# Patient Record
Sex: MALE | Employment: OTHER | URBAN - METROPOLITAN AREA
[De-identification: names, ages, dates, MRNs, and addresses within clinical notes are randomized per-mention and may not be internally consistent; named-entity substitution may affect disease eponyms.]

---

## 2017-10-25 PROBLEM — N40.0 BENIGN PROSTATE HYPERPLASIA: Status: ACTIVE | Noted: 2017-10-25

## 2018-10-11 ENCOUNTER — OFFICE VISIT (OUTPATIENT)
Dept: CARDIOLOGY CLINIC | Age: 83
End: 2018-10-11

## 2018-10-11 VITALS
BODY MASS INDEX: 26.12 KG/M2 | SYSTOLIC BLOOD PRESSURE: 154 MMHG | DIASTOLIC BLOOD PRESSURE: 78 MMHG | OXYGEN SATURATION: 98 % | WEIGHT: 198 LBS | HEART RATE: 64 BPM

## 2018-10-11 DIAGNOSIS — I10 ESSENTIAL HYPERTENSION WITH GOAL BLOOD PRESSURE LESS THAN 140/90: ICD-10-CM

## 2018-10-11 DIAGNOSIS — E78.00 PURE HYPERCHOLESTEROLEMIA: ICD-10-CM

## 2018-10-11 DIAGNOSIS — I25.10 CORONARY ARTERY DISEASE DUE TO LIPID RICH PLAQUE: Primary | ICD-10-CM

## 2018-10-11 DIAGNOSIS — I25.83 CORONARY ARTERY DISEASE DUE TO LIPID RICH PLAQUE: Primary | ICD-10-CM

## 2018-10-11 RX ORDER — NITROGLYCERIN 0.4 MG/1
0.4 TABLET SUBLINGUAL AS NEEDED
Qty: 30 TAB | Refills: 0 | Status: SHIPPED | OUTPATIENT
Start: 2018-10-11 | End: 2018-10-30 | Stop reason: SDUPTHER

## 2018-10-11 NOTE — MR AVS SNAPSHOT
[address] [facility] [address] [facility] [id number] 
[phone] Patient: Kathie Taveras MRN: [id number] FPV:[id number] Visit Information Date & Time Provider Department Dept. Phone Encounter #  
 10/11/2018  1:15 PM Odilon Marsh  [facility] [phone] [id number] Follow-up Instructions Return in about 1 year (around 10/11/2019). Your Appointments 11/5/2018  1:00 PM  
Office Visit with Miguel Sweet MD  
[address] [facility] Appt Note: IOV REFERRED BY IRA DUNN FOR VASCULAR CONSULT  AND EVALUATE FOR PAD, PVD/ OFFICE [facility] NOTES  
 [phone] [facility]  
[phone] [address] [facility] [address] [facility] Upcoming Health Maintenance Date Due  
 LIPID PANEL Q1 10[birth year]20/1933 FOOT EXAM Q1 10/20/1943 MICROALBUMIN Q1 10/20/1943 EYE EXAM RETINAL OR DILATED Q1 10/20/1943 DTaP/Tdap/Td series (1 - Tdap) 10/20/1954 Shingrix Vaccine Age 50> (1 of 2) 10/20/1983 GLAUCOMA SCREENING Q2Y 10/20/1998 HEMOGLOBIN A1C Q6M 11/7/2014 MEDICARE YEARLY EXAM 3/14/2018 Influenza Age 5 to Adult 8/1/2018 Allergies as of 10/11/2018  Review Complete On: 10/11/2018 By: Anibal Sanchez LPN No Known Allergies Current Immunizations  Never Reviewed Name Date Influenza Vaccine (Quad) PF 10/26/2017 12:26 PM  
 Pneumococcal Conjugate (PCV-13) 10/26/2017 12:26 PM  
 Pneumococcal Polysaccharide (PPSV-23) 12/23/2015  4:13 PM, 5/10/2014 12:00 AM  
  
 Not reviewed this visit Vitals BP Pulse Weight(growth percentile) SpO2 BMI Smoking Status 154/78 64 198 lb (89.8 kg) 98% 26.12 kg/m2 Never Smoker BMI and BSA Data Body Mass Index Body Surface Area  
 26.12 kg/m 2 2.15 m 2 Preferred Pharmacy Pharmacy Name Phone [id number]- [id number] [facility], [address] ,[address] [phone] Your Updated Medication List  
  
   
This list is accurate as of 10/11/18  1:23 PM.  Always use your most recent med list.  
  
  
  
  
 aspirin 81 mg chewable tablet Take 1 Tab by mouth daily. docusate sodium 100 mg capsule Commonly known as:  Marlynn Jumana Take 100 mg by mouth daily. entacapone 200 mg tablet Commonly known as:  Monna Pleasure Take 200 mg by mouth three (3) times daily. gabapentin 600 mg tablet Commonly known as:  NEURONTIN Take 600 mg by mouth nightly. isosorbide mononitrate ER 60 mg CR tablet Commonly known as:  IMDUR Take 1 Tab by mouth daily. LORazepam 0.5 mg tablet Commonly known as:  ATIVAN Take  by mouth.  
  
 metoprolol tartrate 25 mg tablet Commonly known as:  LOPRESSOR Take 1 Tab by mouth two (2) times a day. nitroglycerin 0.4 mg SL tablet Commonly known as:  NITROSTAT  
1 Tab by SubLINGual route as needed for Chest Pain. pentoxifylline  mg CR tablet Commonly known as:  TRENTAL Take 400 mg by mouth three (3) times daily (with meals). ramipril 10 mg capsule Commonly known as:  ALTACE Take 1 Cap by mouth daily. raNITIdine 150 mg tablet Commonly known as:  ZANTAC  
  
 ROBINUL 1 mg tablet Generic drug:  glycopyrrolate Take 1 mg by mouth as needed. parkinson  
  
 simvastatin 20 mg tablet Commonly known as:  ZOCOR Take 20 mg by mouth nightly. Sinemet-25/250  mg per tablet Generic drug:  carbidopa-levodopa Take 1 Tab by mouth three (3) times daily. SITagliptin 50 mg tablet Commonly known as:  Yenifer Furnace Take 1 Tab by mouth daily. tamsulosin 0.4 mg capsule Commonly known as:  FLOMAX Take 1 capsule by mouth daily (after dinner). Follow-up Instructions Return in about 1 year (around 10/11/2019). Introducing [facility]! [employer] introduces [employer] patient portal. Now you can access parts of your medical record, email your doctor's office, and request medication refills online. 1. In your internet browser, go to [email or web address] 2. Click on the First Time User? Click Here link in the Sign In box. You will see the New Member Sign Up page. 3. Enter your S[employer]tl Access Code exactly as it appears below. You will not need to use this code after youve completed the sign-up process. If you do not sign up before the expiration date, you must request a new code. · [employer]tl Access Code: [email or web address] Expires: 12/25/2018 11:34 AM 
 
4. Enter the last four digits of your Social Security Number (xxxx) and Date of Birth (mm/dd/yyyy) as indicated and click Submit. You will be taken to the next sign-up page. 5. Create a Scytl ID. This will be your Scytl login ID and cannot be changed, so think of one that is secure and easy to remember. 6. Create a S[employer]tl password. You can change your password at any time. 7. Enter your Password Reset Question and Answer. This can be used at a later time if you forget your password. 8. Enter your e-mail address. You will receive e-mail notification when new information is available in [address] 9. Click Sign Up. You can now view and download portions of your medical record. 10. Click the Download Summary menu link to download a portable copy of your medical information. If you have questions, please visit the Frequently Asked Questions section of the S[employer]tl website. Remember, S[employer]tl is NOT to be used for urgent needs. For medical emergencies, dial 911. Now available from your iPhone and Android! Please provide this summary of care documentation to your next provider. Your primary care clinician is listed as Markus Weller. If you have any questions after today's visit, please call [phone].

## 2018-10-11 NOTE — PROGRESS NOTES
1. Have you been to the ER, urgent care clinic since your last visit? Hospitalized since your last visit? No  
 
2. Have you seen or consulted any other health care providers outside of the [address] [place] [address] since your last visit? Include any pap smears or colon screening.  No

## 2018-10-11 NOTE — PROGRESS NOTES
[facility] As you know, Mr. Damir Kelley is an 80 y.o. male with advanced Parkinson's disease with functional limitation, diabetes, CAD, hypertension, hyperlipidemia, stroke, as well as benign prostatic hypertrophy and severe three vessel coronary artery disease. Mr. Damir Kelley is here today for a follow up appointment. He is here today after almost 2 years. He is accompanied with the family member. He is using nitroglycerin probably once a month. This has remained stable over couple years. He denies any palpitations presyncope or syncope. He is taking all his medications regularly. He has occasional lower extremity swelling especially towards the end of the day. Denies any nausea, vomiting, fever, chills, sputum production. No hematuria or other bleeding complaints Past Medical History:  
Diagnosis Date  Benign prostatic hyperplasia with urinary retention  BPH (benign prostatic hyperplasia)  CAD (coronary artery disease) Severe 3 vessel ( Cath 05/2014) Medical management  Colon cancer ([address]  CVA (cerebral infarction) x2  
 Diabetes ([address]  High cholesterol  History of colonoscopy  HLD (hyperlipidemia)  Hypertension  Osteoarthritis  Parkinson's disease ([address]  Shortness of breath  Stroke ([address]  Tuberculosis of lung  Urinary retention Past Surgical History:  
Procedure Laterality Date  HX HEART CATHETERIZATION    
 severe 3 vessel- medical management 2014  HX ORTHOPAEDIC    
 R leg surgery  HX UROLOGICAL  10/25/2017 [facility] Current Outpatient Prescriptions Medication Sig  
 raNITIdine (ZANTAC) 150 mg tablet  glycopyrrolate (ROBINUL) 1 mg tablet Take 1 mg by mouth as needed. parkinson  pentoxifylline CR (TRENTAL) 400 mg CR tablet Take 400 mg by mouth three (3) times daily (with meals).  entacapone (COMTAN) 200 mg tablet Take 200 mg by mouth three (3) times daily.  isosorbide mononitrate ER (IMDUR) 60 mg CR tablet Take 1 Tab by mouth daily. (Patient taking differently: Take 60 mg by mouth nightly.)  ramipril (ALTACE) 10 mg capsule Take 1 Cap by mouth daily. (Patient taking differently: Take 5 mg by mouth daily.)  sitaGLIPtin (JANUVIA) 50 mg tablet Take 1 Tab by mouth daily.  docusate sodium (COLACE) 100 mg capsule Take 100 mg by mouth daily.  gabapentin (NEURONTIN) 600 mg tablet Take 600 mg by mouth nightly.  simvastatin (ZOCOR) 20 mg tablet Take 20 mg by mouth nightly.  tamsulosin (FLOMAX) 0.4 mg capsule Take 1 capsule by mouth daily (after dinner).  LORazepam (ATIVAN) 0.5 mg tablet Take  by mouth.  metoprolol (LOPRESSOR) 25 mg tablet Take 1 Tab by mouth two (2) times a day.  aspirin 81 mg chewable tablet Take 1 Tab by mouth daily. (Patient taking differently: Take 81 mg by mouth daily. havent taken ASA in 6 months)  nitroglycerin (NITROSTAT) 0.4 mg SL tablet 1 Tab by SubLINGual route as needed for Chest Pain.  carbidopa-levodopa (SINEMET-25/250)  mg per tablet Take 1 Tab by mouth three (3) times daily. No current facility-administered medications for this visit. Allergies and Sensitivities: 
No Known Allergies Family History: 
Family History Problem Relation Age of Onset  Heart Disease Mother  Hypertension Mother  Hypertension Father  Heart Disease Father  Cancer Sister  Heart Disease Sister  Hypertension Sister Social History: 
Social History Substance Use Topics  Smoking status: Never Smoker  Smokeless tobacco: Never Used  Alcohol use No  
 
He  reports that he has never smoked. He has never used smokeless tobacco.  He  reports that he does not drink alcohol. Review of Systems: 
Cardiac symptoms as noted above in HPI. All others negative. Denies  blurring vision, photophobia, neck pain, hemoptysis, chronic cough, nausea, vomiting, hematuria, burning micturition, BRBPR, chronic headaches. Physical Exam: 
BP Readings from Last 3 Encounters:  
10/11/18 154/78  
11/27/17 128/88  
11/07/17 130/90 Pulse Readings from Last 3 Encounters:  
10/11/18 64  
10/27/17 67  
12/19/16 79 Wt Readings from Last 3 Encounters:  
10/11/18 198 lb (89.8 kg)  
11/27/17 198 lb (89.8 kg) 11/07/17 197 lb (89.4 kg) Constitutional: Oriented to person, place, and time. HENT: Head: Normocephalic and atraumatic. Neck: No JVD present. Cardiovascular: Regular rhythm. No murmur, gallop or rubs appreciated Lung: Breath sounds normal. No respiratory distress. No ronchi or rales appreciated Abdominal: No tenderness. Musculoskeletal: There is trace ankle edema. No cynosis Review of Data LABS:  
Lab Results Component Value Date/Time Sodium 136 10/26/2017 05:59 AM  
 Potassium 4.1 10/26/2017 05:59 AM  
 Chloride 102 10/26/2017 05:59 AM  
 CO2 28 10/26/2017 05:59 AM  
 Glucose 141 (H) 10/26/2017 05:59 AM  
 BUN 19 10/26/2017 05:59 AM  
 Creatinine 1.1 10/26/2017 05:59 AM  
 
 
Lab Results Component Value Date/Time ALT (SGPT) 17 12/21/2015 05:10 PM  
 
Lab Results Component Value Date/Time Hemoglobin A1c 7.8 (H) 05/07/2014 07:55 AM  
 
EKG 
(05/2014) Sinus rhythm at 85 bpm. No ST-T changes of ischemia. No pathologic q waves. ECHO (05/2014) Left ventricle: Size was normal. Systolic function was mildly reduced. The parasternl short axis view suggested inferoseptal dyskinesis. This could 
not be confirmed in other views. Ejection fraction was estimated in therange of 45 % to 50 %. Wall thickness was normal. Doppler parameters wereconsistent with abnormal left ventricular relaxation (grade 1 diastolic dysfunction). Right ventricle: The size was normal. Estimated peak pressure was in the range of 30 mmHg to 35 mmHg. Left atrium: Size was normal. 
Right atrium: Size was normal. 
Mitral valve: There was mild regurgitation. Aortic valve: The valve was trileaflet. Leaflets exhibited mild calcification, normal cuspal separation, and sclerosis. There was nostenosis. There was no regurgitation. Tricuspid valve: There was trivial regurgitation. CATHETERIZATION (05/2014) 1. LM: Left main appears to be heavily calcified and diffusely diseased. The caliber of the left main angiographically was even smaller  
than the large obtuse marginal branch which is likely because of diffuse disease. 2. LAD: Proximal and mid heavy calcification. There was evidence of prox-mid heavily calcified filling defect with likely  
eccentric 70% stenosis, most obvious in [place] caudal view. Distal LAD has a couple of 40-50% lesions. There were 2 diagonal  
branches. First diagonal branch has a proximal to 80-90% ostial disease. This is a small to medium caliber vessel. Second diagonal branch  
has no significant disease. 3. RAMUS: ostial 99% disease followed by mid 100% occlusion. 4. LCX/OM: heavy ostial calcification noted with likely ostial borderline 50-60% disease. There was 90-degree angulation. Proximal circumflex has 60-70% disease. Large obtuse marginal branch has 90% disease. Second obtuse marginal branch has 90% disease which is medium  
caliber vessel. 5. RCA: Likely 100% ostial occlusion with evidence of heavy left-to-right collaterals supplying the right PDA. IMPRESSION & PLAN: 
Mr. Libertad Singh is an [de-identified]year old male with a history of coronary artery disease, diabetes, hypertension, hyperlipidemia, advanced Parkinson's disease. Coronary artery disease: As mentioned above, Mr. Libertad Singh has three vessel coronary artery disease, probably including left main. He has remained on medical therapy since 2014 and he has done very well.    
He uses sublingual nitroglycerin probably once a month which has remained stable since last time. He denies any worsening of his symptoms. He is on dual antianginal medication. Continue with aspirin. Ischemic cardiomyopathy:  Last Ejection fraction noted was 45-50% in 2014. No evidence of fluid overload on exam at this time. Continue same meds. Currently he is on metoprolol and ramipril. Continue same. Hypertension:  Blood pressure is 154/70 mmHg. Continue current antihypertensive medication. Hyperlipidemia:  He is on simvastatin. This is being managed by PCP. Defer to them Diabetes:   Last Hemoglobin A1c on file is 7.8. This is being managed by primary care provider. Goal hemoglobin A1c should be less than 7 from a cardiovascular standpoint. Defer to PCP Parkinson's disease: This is being managed by primary care provider. Importance of diet and exercise was discussed with patient. This plan was discussed with patient who is in agreement. Thank you for allowing me to participate in patient care. Please feel free to call me if you have any question or concern.

## 2018-10-30 RX ORDER — NITROGLYCERIN 0.4 MG/1
0.4 TABLET SUBLINGUAL AS NEEDED
Qty: 1 BOTTLE | Refills: 3 | Status: SHIPPED | OUTPATIENT
Start: 2018-10-30 | End: 2018-11-06 | Stop reason: SDUPTHER

## 2018-10-30 NOTE — TELEPHONE ENCOUNTER
PCP: Mechelle Mckay MD    Last appt: 10/11/2018  Future Appointments   Date Time Provider Tete Rendon   2018  1:00 PM Nader Saunders MD [address] [address]       Requested Prescriptions     Pending Prescriptions Disp Refills    nitroglycerin (NITROSTAT) 0.4 mg SL tablet 1 Bottle 3     Si Tab by SubLINGual route as needed for Chest Pain.

## 2018-11-05 ENCOUNTER — OFFICE VISIT (OUTPATIENT)
Dept: VASCULAR SURGERY | Age: 83
End: 2018-11-05

## 2018-11-05 VITALS
HEART RATE: 72 BPM | HEIGHT: 73 IN | SYSTOLIC BLOOD PRESSURE: 112 MMHG | RESPIRATION RATE: 14 BRPM | DIASTOLIC BLOOD PRESSURE: 70 MMHG | BODY MASS INDEX: 26.24 KG/M2 | WEIGHT: 198 LBS

## 2018-11-05 DIAGNOSIS — I70.209 DIABETES MELLITUS TYPE 2 WITH ATHEROSCLEROSIS OF ARTERIES OF EXTREMITIES (HCC): ICD-10-CM

## 2018-11-05 DIAGNOSIS — E11.51 DIABETES MELLITUS TYPE 2 WITH ATHEROSCLEROSIS OF ARTERIES OF EXTREMITIES (HCC): ICD-10-CM

## 2018-11-05 DIAGNOSIS — I73.9 PAD (PERIPHERAL ARTERY DISEASE) (HCC): ICD-10-CM

## 2018-11-05 DIAGNOSIS — I70.229 CRITICAL LOWER LIMB ISCHEMIA (HCC): Primary | ICD-10-CM

## 2018-11-05 PROBLEM — E11.40 TYPE 2 DIABETES MELLITUS WITH DIABETIC NEUROPATHY (HCC): Status: ACTIVE | Noted: 2018-11-05

## 2018-11-05 RX ORDER — FINASTERIDE 5 MG/1
5 TABLET, FILM COATED ORAL DAILY
COMMUNITY

## 2018-11-05 RX ORDER — POTASSIUM CHLORIDE 750 MG/1
TABLET, FILM COATED, EXTENDED RELEASE ORAL
COMMUNITY
End: 2019-12-20

## 2018-11-05 NOTE — PROGRESS NOTES
Ky  Chief Complaint Patient presents with  New Patient  Leg Pain HPI Ky  is a 80 y.o. male with peripheral arterial disease and type 2 diabetes mellitus. Patient also has severe coronary artery disease which is under medical management only as he is unable to undergo CABG given age and Parkinson's disease. Patient also has critical limb ischemia based on low toe pressure to the left lower extremity. Patient does have a small wound on the medial aspect of the malleolus on the  left lower extremity. He does complain of pain at night to bilateral legs. But does not complain of any rest pain or claudication. But patient does not walk as he is mostly wheelchair-bound. No other ulcerations or wounds at this current time. No fevers or chills. Past Medical History:  
Diagnosis Date  Benign prostatic hyperplasia with urinary retention  BPH (benign prostatic hyperplasia)  CAD (coronary artery disease) Severe 3 vessel ( Cath 05/2014) Medical management  Colon cancer ([address]  CVA (cerebral infarction) x2  
 Diabetes ([address]  High cholesterol  History of colonoscopy  HLD (hyperlipidemia)  Hypertension  Osteoarthritis  Parkinson's disease ([address]  Shortness of breath  Stroke ([address]  Tuberculosis of lung  Urinary retention Patient Active Problem List  
Diagnosis Code  Acute bronchitis J20.9  Chest pain, unspecified R07.9  
 HTN (hypertension) I10  
 Parkinson disease ([address] G20  
 Pure hypercholesterolemia E78.00  DM (diabetes mellitus) ([address] E11.9  Acute coronary syndrome (HCC) I24.9  Acute renal insufficiency N28.9  Coronary atherosclerosis of native coronary artery I25.10  BPH (benign prostatic hyperplasia) N40.0  Urinary retention R33.9  Partial small bowel obstruction ([address] K56.600  Benign prostate hyperplasia N40.0  NSTEMI (non-ST elevated myocardial infarction) [facility] I21.4  Benign prostatic hyperplasia with urinary retention N40.1, R33.8  CAD (coronary artery disease) I25.10  Colon cancer ([facility] C18.9  Diabetes ([facility] E11.9  High cholesterol E78.00  
 History of colonoscopy Z98.890  
 HLD (hyperlipidemia) E78.5  Osteoarthritis M19.90  Parkinson's disease ([facility] Nancy Devin  Shortness of breath R06.02  
 Stroke (HCC) I63.9  Hypertension I10  Type 2 diabetes mellitus with diabetic neuropathy (HCC) E11.40 Past Surgical History:  
Procedure Laterality Date  HX HEART CATHETERIZATION    
 severe 3 vessel- medical management 2014  HX ORTHOPAEDIC    
 R leg surgery  [facility] UROLOG[facility]  [facility] Medications Medication Sig Dispense Refill  finasteride (PROSCAR) 5 mg tablet Take 5 mg by mouth daily.  potassium chloride SR (KLOR-CON 10) 10 mEq tablet Take  by mouth.  nitroglycerin (NITROSTAT) 0.4 mg SL tablet 1 Tab by SubLINGual route as needed for Chest Pain. 1 Bottle 3  
 glycopyrrolate (ROBINUL) 1 mg tablet Take 1 mg by mouth as needed. parkinson  isosorbide mononitrate ER (IMDUR) 60 mg CR tablet Take 1 Tab by mouth daily. (Patient taking differently: Take 60 mg by mouth nightly.) 30 Tab 6  
 ramipril (ALTACE) 10 mg capsule Take 1 Cap by mouth daily. (Patient taking differently: Take 5 mg by mouth daily.) 30 Cap 0  
 sitaGLIPtin (JANUVIA) 50 mg tablet Take 1 Tab by mouth daily. 30 Tab 0  
 docusate sodium (COLACE) 100 mg capsule Take 100 mg by mouth daily.  gabapentin (NEURONTIN) 600 mg tablet Take 600 mg by mouth nightly.  simvastatin (ZOCOR) 20 mg tablet Take 20 mg by mouth nightly.  tamsulosin (FLOMAX) 0.4 mg capsule Take 1 capsule by mouth daily (after dinner). 90 capsule 3  
 metoprolol (LOPRESSOR) 25 mg tablet Take 1 Tab by mouth two (2) times a day.  60 Tab 11  
  aspirin 81 mg chewable tablet Take 1 Tab by mouth daily. (Patient taking differently: Take 81 mg by mouth daily. havent taken ASA in 6 months) 90 Tab 3  carbidopa-levodopa (SINEMET-25/250)  mg per tablet Take 1 Tab by mouth three (3) times daily. No Known Allergies Social History Socioeconomic History  Marital status:  Spouse name: Not on file  Number of children: Not on file  Years of education: Not on file  Highest education level: Not on file Social Needs  Financial resource strain: Not on file  Food insecurity - worry: Not on file  Food insecurity - inability: Not on file  Transportation needs - medical: Not on file  Transportation needs - non-medical: Not on file Occupational History  Not on file Tobacco Use  Smoking status: Never Smoker  Smokeless tobacco: Never Used Substance and Sexual Activity  Alcohol use: No  
 Drug use: No  
 Sexual activity: No  
Other Topics Concern  Not on file Social History Narrative  Not on file Family History Problem Relation Age of Onset  Heart Disease Mother  Hypertension Mother  Hypertension Father  Heart Disease Father  Cancer Sister  Heart Disease Sister  Hypertension Sister Review of Systems Constitutional: negative Eyes: negative Ears, nose, mouth, throat, and face: negative Respiratory: negative Cardiovascular: negative Gastrointestinal: negative Genitourinary:negative Hematologic/lymphatic: negative Musculoskeletal:negative Neurological: negative Behavioral/Psych: negative Endocrine: negative Allergic/Immunologic: negative Unless otherwise mentioned in the HPI. Physical Exam:   
Visit Vitals /70 (BP 1 Location: Left arm, BP Patient Position: Sitting) Pulse 72 Resp 14 Ht 6' 1\" (1.854 m) Wt 198 lb (89.8 kg) BMI 26.12 kg/m² General: Well-appearing male in no acute distress HEENT: EOMI no scleral icterus is noted Pulmonary: No increased work of breathing is noted clear to auscultation bilaterally no wheezes rales rhonchi 
Cardiovascular: Regular rate and rhythm patient does have a systolic ejection murmur grade 4/6 heard best at the left upper sternal border no gallops heard no carotid bruits bilaterally Abdomen: Soft nontender nondistended no rebound or guarding is noted no palpable pulsatile abdominal mass can be felt Extremities: Warm and perfused bilaterally patient does have trace edema bilateral lower extremities he has minor varicosities of bilateral lower extremities no skin changes at this current time. On the left medial malleolus there is a unstageable wound with eschar measuring 0.3 cm circumferentially no cellulitis is identified no other active wounds or ulcerations identified Neuro: Cranial nerves II through XII are grossly intact Impression and Plan: 
Alessandra Gaston is a 80 y.o. male with type 2 diabetes mellitus with peripheral arterial disease and unstageable wound of the left lower extremity and critical lower limb ischemia based on JOSHUA. Patient also has moderate to severe arterial disease of the right lower extremity. He does have night cramps. Given his wound and critical lower limb ischemia of the left lower extremity I would recommend moving forward with a left lower extremity angiogram.  Given his coronary artery disease he is only eligible for endovascular procedure. Further plan pending angiography. We did go over all the risks and benefits of the procedure including but not limited to bleeding, infection, damage to adjacent structures, MI, stroke, death, loss of lower extremity, need for further surgery. Patient is understanding all the risks and is willing to move forward with the procedure. We reviewed the plan with the patient and the patient understands.   We also gave the patient appropriate instructions on their disease process and when to call back. Greater than 50% of this visit was spent with face to face discussion. Follow-up Disposition: Not on File Aurelia Spain MD 
 
PLEASE NOTE: 
This document has been produced using voice recognition software. Unrecognized errors in transcription may be present.

## 2018-11-05 NOTE — H&P (VIEW-ONLY)
Donna Issa Chief Complaint Patient presents with  New Patient  Leg Pain HPI Donna Issa is a 80 y.o. male with peripheral arterial disease and type 2 diabetes mellitus. Patient also has severe coronary artery disease which is under medical management only as he is unable to undergo CABG given age and Parkinson's disease. Patient also has critical limb ischemia based on low toe pressure to the left lower extremity. Patient does have a small wound on the medial aspect of the malleolus on the  left lower extremity. He does complain of pain at night to bilateral legs. But does not complain of any rest pain or claudication. But patient does not walk as he is mostly wheelchair-bound. No other ulcerations or wounds at this current time. No fevers or chills. Past Medical History:  
Diagnosis Date  Benign prostatic hyperplasia with urinary retention  BPH (benign prostatic hyperplasia)  CAD (coronary artery disease) Severe 3 vessel ( Cath 05/2014) Medical management  Colon cancer ([address]  CVA (cerebral infarction) x2  
 Diabetes ([address]  High cholesterol  History of colonoscopy  HLD (hyperlipidemia)  Hypertension  Osteoarthritis  Parkinson's disease ([address]  Shortness of breath  Stroke ([address]  Tuberculosis of lung  Urinary retention Patient Active Problem List  
Diagnosis Code  Acute bronchitis J20.9  Chest pain, unspecified R07.9  
 HTN (hypertension) I10  
 Parkinson disease ([address] G20  
 Pure hypercholesterolemia E78.00  DM (diabetes mellitus) ([address] E11.9  Acute coronary syndrome (HCC) I24.9  Acute renal insufficiency N28.9  Coronary atherosclerosis of native coronary artery I25.10  BPH (benign prostatic hyperplasia) N40.0  Urinary retention R33.9  Partial small bowel obstruction ([address] K56.600  Benign prostate hyperplasia N40.0  NSTEMI (non-ST elevated myocardial infarction) [facility] I21.4  Benign prostatic hyperplasia with urinary retention N40.1, R33.8  CAD (coronary artery disease) I25.10  Colon cancer ([facility] C18.9  Diabetes ([facility] E11.9  High cholesterol E78.00  
 History of colonoscopy Z98.890  
 HLD (hyperlipidemia) E78.5  Osteoarthritis M19.90  Parkinson's disease ([facility] Misael Alegre  Shortness of breath R06.02  
 Stroke (HCC) I63.9  Hypertension I10  Type 2 diabetes mellitus with diabetic neuropathy (HCC) E11.40 Past Surgical History:  
Procedure Laterality Date  HX HEART CATHETERIZATION    
 severe 3 vessel- medical management 2014  HX ORTHOPAEDIC    
 R leg surgery  [facility]OLOG[facility]  [facility] Current [facility]patient Medications Medication Sig Dispense Refill  finasteride (PROSCAR) 5 mg tablet Take 5 mg by mouth daily.  potassium chloride SR (KLOR-CON 10) 10 mEq tablet Take  by mouth.  nitroglycerin (NITROSTAT) 0.4 mg SL tablet 1 Tab by SubLINGual route as needed for Chest Pain. 1 Bottle 3  
 glycopyrrolate (ROBINUL) 1 mg tablet Take 1 mg by mouth as needed. parkinson  isosorbide mononitrate ER (IMDUR) 60 mg CR tablet Take 1 Tab by mouth daily. (Patient taking differently: Take 60 mg by mouth nightly.) 30 Tab 6  
 ramipril (ALTACE) 10 mg capsule Take 1 Cap by mouth daily. (Patient taking differently: Take 5 mg by mouth daily.) 30 Cap 0  
 sitaGLIPtin (JANUVIA) 50 mg tablet Take 1 Tab by mouth daily. 30 Tab 0  
 docusate sodium (COLACE) 100 mg capsule Take 100 mg by mouth daily.  gabapentin (NEURONTIN) 600 mg tablet Take 600 mg by mouth nightly.  simvastatin (ZOCOR) 20 mg tablet Take 20 mg by mouth nightly.  tamsulosin (FLOMAX) 0.4 mg capsule Take 1 capsule by mouth daily (after dinner). 90 capsule 3  
 metoprolol (LOPRESSOR) 25 mg tablet Take 1 Tab by mouth two (2) times a day.  60 Tab 11  
  aspirin 81 mg chewable tablet Take 1 Tab by mouth daily. (Patient taking differently: Take 81 mg by mouth daily. havent taken ASA in 6 months) 90 Tab 3  carbidopa-levodopa (SINEMET-25/250)  mg per tablet Take 1 Tab by mouth three (3) times daily. No Known Allergies Social History Socioeconomic History  Marital status:  Spouse name: Not on file  Number of children: Not on file  Years of education: Not on file  Highest education level: Not on file Social Needs  Financial resource strain: Not on file  Food insecurity - worry: Not on file  Food insecurity - inability: Not on file  Transportation needs - medical: Not on file  Transportation needs - non-medical: Not on file Occupational History  Not on file Tobacco Use  Smoking status: Never Smoker  Smokeless tobacco: Never Used Substance and Sexual Activity  Alcohol use: No  
 Drug use: No  
 Sexual activity: No  
Other Topics Concern  Not on file Social History Narrative  Not on file Family History Problem Relation Age of Onset  Heart Disease Mother  Hypertension Mother  Hypertension Father  Heart Disease Father  Cancer Sister  Heart Disease Sister  Hypertension Sister Review of Systems Constitutional: negative Eyes: negative Ears, nose, mouth, throat, and face: negative Respiratory: negative Cardiovascular: negative Gastrointestinal: negative Genitourinary:negative Hematologic/lymphatic: negative Musculoskeletal:negative Neurological: negative Behavioral/Psych: negative Endocrine: negative Allergic/Immunologic: negative Unless otherwise mentioned in the HPI. Physical Exam:   
Visit Vitals /70 (BP 1 Location: Left arm, BP Patient Position: Sitting) Pulse 72 Resp 14 Ht 6' 1\" (1.854 m) Wt 198 lb (89.8 kg) BMI 26.12 kg/m² General: Well-appearing male in no acute distress HEENT: EOMI no scleral icterus is noted Pulmonary: No increased work of breathing is noted clear to auscultation bilaterally no wheezes rales rhonchi 
Cardiovascular: Regular rate and rhythm patient does have a systolic ejection murmur grade 4/6 heard best at the left upper sternal border no gallops heard no carotid bruits bilaterally Abdomen: Soft nontender nondistended no rebound or guarding is noted no palpable pulsatile abdominal mass can be felt Extremities: Warm and perfused bilaterally patient does have trace edema bilateral lower extremities he has minor varicosities of bilateral lower extremities no skin changes at this current time. On the left medial malleolus there is a unstageable wound with eschar measuring 0.3 cm circumferentially no cellulitis is identified no other active wounds or ulcerations identified Neuro: Cranial nerves II through XII are grossly intact Impression and Plan: 
Colton Aiken is a 80 y.o. male with type 2 diabetes mellitus with peripheral arterial disease and unstageable wound of the left lower extremity and critical lower limb ischemia based on JOSHUA. Patient also has moderate to severe arterial disease of the right lower extremity. He does have night cramps. Given his wound and critical lower limb ischemia of the left lower extremity I would recommend moving forward with a left lower extremity angiogram.  Given his coronary artery disease he is only eligible for endovascular procedure. Further plan pending angiography. We did go over all the risks and benefits of the procedure including but not limited to bleeding, infection, damage to adjacent structures, MI, stroke, death, loss of lower extremity, need for further surgery. Patient is understanding all the risks and is willing to move forward with the procedure. We reviewed the plan with the patient and the patient understands.   We also gave the patient appropriate instructions on their disease process and when to call back. Greater than 50% of this visit was spent with face to face discussion. Follow-up Disposition: Not on File Alexander Ward MD 
 
PLEASE NOTE: 
This document has been produced using voice recognition software. Unrecognized errors in transcription may be present.

## 2018-11-07 RX ORDER — NITROGLYCERIN 0.4 MG/1
0.4 TABLET SUBLINGUAL AS NEEDED
Qty: 1 BOTTLE | Refills: 3 | Status: SHIPPED | OUTPATIENT
Start: 2018-11-07

## 2018-11-26 ENCOUNTER — HOSPITAL ENCOUNTER (OUTPATIENT)
Dept: PREADMISSION TESTING | Age: 83
Discharge: HOME | End: 2018-11-26
Payer: MEDICARE

## 2018-11-26 DIAGNOSIS — E11.51 DIABETES MELLITUS TYPE 2 WITH ATHEROSCLEROSIS OF ARTERIES OF EXTREMITIES (HCC): ICD-10-CM

## 2018-11-26 DIAGNOSIS — I70.229 CRITICAL LOWER LIMB ISCHEMIA (HCC): ICD-10-CM

## 2018-11-26 DIAGNOSIS — I70.209 DIABETES MELLITUS TYPE 2 WITH ATHEROSCLEROSIS OF ARTERIES OF EXTREMITIES (HCC): ICD-10-CM

## 2018-11-26 DIAGNOSIS — I73.9 PAD (PERIPHERAL ARTERY DISEASE) (HCC): ICD-10-CM

## 2018-11-26 LAB
ANION GAP SERPL CALC-SCNC: 8 MMOL/L (ref 3–18)
BUN SERPL-MCNC: 18 MG/DL (ref 7–18)
BUN/CREAT SERPL: 13 (ref 12–20)
CALCIUM SERPL-MCNC: 9.3 MG/DL (ref 8.5–10.1)
CHLORIDE SERPL-SCNC: 105 MMOL/L (ref 100–108)
CO2 SERPL-SCNC: 28 MMOL/L (ref 21–32)
CREAT SERPL-MCNC: 1.38 MG/DL (ref 0.6–1.3)
ERYTHROCYTE [DISTWIDTH] IN BLOOD BY AUTOMATED COUNT: 14.9 % (ref 11.6–14.5)
GLUCOSE SERPL-MCNC: 132 MG/DL (ref 74–99)
HCT VFR BLD AUTO: 47.5 % (ref 36–48)
HGB BLD-MCNC: 15 G/DL (ref 13–16)
MCH RBC QN AUTO: 27.8 PG (ref 24–34)
MCHC RBC AUTO-ENTMCNC: 31.6 G/DL (ref 31–37)
MCV RBC AUTO: 88.1 FL (ref 74–97)
PLATELET # BLD AUTO: 272 K/UL (ref 135–420)
PMV BLD AUTO: 10.3 FL (ref 9.2–11.8)
POTASSIUM SERPL-SCNC: 4.5 MMOL/L (ref 3.5–5.5)
RBC # BLD AUTO: 5.39 M/UL (ref 4.7–5.5)
SODIUM SERPL-SCNC: 141 MMOL/L (ref 136–145)
WBC # BLD AUTO: 11.9 K/UL (ref 4.6–13.2)

## 2018-11-26 PROCEDURE — 85027 COMPLETE CBC AUTOMATED: CPT

## 2018-11-26 PROCEDURE — 80048 BASIC METABOLIC PNL TOTAL CA: CPT

## 2018-11-26 PROCEDURE — 36415 COLL VENOUS BLD VENIPUNCTURE: CPT

## 2018-11-29 ENCOUNTER — HOSPITAL ENCOUNTER (OUTPATIENT)
Age: 83
Setting detail: OUTPATIENT SURGERY
Discharge: HOME | End: 2018-11-29
Attending: SURGERY | Admitting: SURGERY
Payer: MEDICARE

## 2018-11-29 VITALS
SYSTOLIC BLOOD PRESSURE: 149 MMHG | RESPIRATION RATE: 12 BRPM | OXYGEN SATURATION: 98 % | TEMPERATURE: 96.7 F | HEART RATE: 58 BPM | DIASTOLIC BLOOD PRESSURE: 76 MMHG

## 2018-11-29 DIAGNOSIS — I99.8 ANGIECTASIS PREGNANCY: ICD-10-CM

## 2018-11-29 DIAGNOSIS — I73.9 PERIPHERAL VASCULAR DISEASE, UNSPECIFIED (HCC): ICD-10-CM

## 2018-11-29 DIAGNOSIS — O99.419 ANGIECTASIS PREGNANCY: ICD-10-CM

## 2018-11-29 PROCEDURE — C1769 GUIDE WIRE: HCPCS | Performed by: SURGERY

## 2018-11-29 PROCEDURE — 75710 ARTERY X-RAYS ARM/LEG: CPT | Performed by: SURGERY

## 2018-11-29 PROCEDURE — 74011636320 HC RX REV CODE- 636/320: Performed by: SURGERY

## 2018-11-29 PROCEDURE — 75625 CONTRAST EXAM ABDOMINL AORTA: CPT | Performed by: SURGERY

## 2018-11-29 PROCEDURE — C1760 CLOSURE DEV, VASC: HCPCS | Performed by: SURGERY

## 2018-11-29 PROCEDURE — 76937 US GUIDE VASCULAR ACCESS: CPT | Performed by: SURGERY

## 2018-11-29 PROCEDURE — C1894 INTRO/SHEATH, NON-LASER: HCPCS | Performed by: SURGERY

## 2018-11-29 PROCEDURE — 99153 MOD SED SAME PHYS/QHP EA: CPT | Performed by: SURGERY

## 2018-11-29 PROCEDURE — 77030016815: Performed by: SURGERY

## 2018-11-29 PROCEDURE — C1887 CATHETER, GUIDING: HCPCS | Performed by: SURGERY

## 2018-11-29 PROCEDURE — 74011250636 HC RX REV CODE- 250/636

## 2018-11-29 PROCEDURE — C1725 CATH, TRANSLUMIN NON-LASER: HCPCS | Performed by: SURGERY

## 2018-11-29 PROCEDURE — 99152 MOD SED SAME PHYS/QHP 5/>YRS: CPT | Performed by: SURGERY

## 2018-11-29 PROCEDURE — C1724 CATH, TRANS ATHEREC,ROTATION: HCPCS | Performed by: SURGERY

## 2018-11-29 PROCEDURE — 77030013744: Performed by: SURGERY

## 2018-11-29 PROCEDURE — 74011250636 HC RX REV CODE- 250/636: Performed by: SURGERY

## 2018-11-29 PROCEDURE — 37225 HC ARTHERC FEMPOP UNI +/-PTA: CPT | Performed by: SURGERY

## 2018-11-29 RX ORDER — SODIUM CHLORIDE 0.9 % (FLUSH) 0.9 %
5-10 SYRINGE (ML) INJECTION EVERY 8 HOURS
Status: DISCONTINUED | OUTPATIENT
Start: 2018-11-29 | End: 2018-11-29 | Stop reason: HOSPADM

## 2018-11-29 RX ORDER — LIDOCAINE HYDROCHLORIDE 10 MG/ML
INJECTION, SOLUTION EPIDURAL; INFILTRATION; INTRACAUDAL; PERINEURAL AS NEEDED
Status: DISCONTINUED | OUTPATIENT
Start: 2018-11-29 | End: 2018-11-29 | Stop reason: HOSPADM

## 2018-11-29 RX ORDER — MIDAZOLAM HYDROCHLORIDE 1 MG/ML
INJECTION, SOLUTION INTRAMUSCULAR; INTRAVENOUS AS NEEDED
Status: DISCONTINUED | OUTPATIENT
Start: 2018-11-29 | End: 2018-11-29 | Stop reason: HOSPADM

## 2018-11-29 RX ORDER — FENTANYL CITRATE 50 UG/ML
INJECTION, SOLUTION INTRAMUSCULAR; INTRAVENOUS AS NEEDED
Status: DISCONTINUED | OUTPATIENT
Start: 2018-11-29 | End: 2018-11-29 | Stop reason: HOSPADM

## 2018-11-29 RX ORDER — MORPHINE SULFATE 10 MG/ML
1 INJECTION, SOLUTION INTRAMUSCULAR; INTRAVENOUS
Status: DISCONTINUED | OUTPATIENT
Start: 2018-11-29 | End: 2018-11-29 | Stop reason: HOSPADM

## 2018-11-29 RX ORDER — ONDANSETRON 2 MG/ML
4 INJECTION INTRAMUSCULAR; INTRAVENOUS
Status: DISCONTINUED | OUTPATIENT
Start: 2018-11-29 | End: 2018-11-29 | Stop reason: HOSPADM

## 2018-11-29 RX ORDER — DIPHENHYDRAMINE HYDROCHLORIDE 50 MG/ML
12.5 INJECTION, SOLUTION INTRAMUSCULAR; INTRAVENOUS
Status: DISCONTINUED | OUTPATIENT
Start: 2018-11-29 | End: 2018-11-29 | Stop reason: HOSPADM

## 2018-11-29 RX ORDER — SODIUM CHLORIDE 0.9 % (FLUSH) 0.9 %
5-10 SYRINGE (ML) INJECTION AS NEEDED
Status: DISCONTINUED | OUTPATIENT
Start: 2018-11-29 | End: 2018-11-29 | Stop reason: HOSPADM

## 2018-11-29 RX ORDER — OXYCODONE AND ACETAMINOPHEN 5; 325 MG/1; MG/1
1 TABLET ORAL
Status: DISCONTINUED | OUTPATIENT
Start: 2018-11-29 | End: 2018-11-29 | Stop reason: HOSPADM

## 2018-11-29 RX ORDER — ACETAMINOPHEN 325 MG/1
650 TABLET ORAL
Status: DISCONTINUED | OUTPATIENT
Start: 2018-11-29 | End: 2018-11-29 | Stop reason: HOSPADM

## 2018-11-29 RX ORDER — HEPARIN SODIUM 1000 [USP'U]/ML
INJECTION, SOLUTION INTRAVENOUS; SUBCUTANEOUS AS NEEDED
Status: DISCONTINUED | OUTPATIENT
Start: 2018-11-29 | End: 2018-11-29 | Stop reason: HOSPADM

## 2018-11-29 NOTE — Clinical Note
Peripheral Lesion 1, atherectomy performed: rotational. Pass RPM = 90. Duration = 21 sec. Pass # = 2.

## 2018-11-29 NOTE — Clinical Note
TRANSFER - IN REPORT:  
 
Verbal report received from: Kayla Jaeger RN. Report consisted of patient's Situation, Background, Assessment and  
Recommendations(SBAR). Opportunity for questions and clarification was provided. Assessment completed upon patient's arrival to unit and care assumed. Patient transported with a Cardiac Cath Tech / Patient Care Tech.

## 2018-11-29 NOTE — Clinical Note
Peripheral Lesion 1. Balloon inflated using single inflation technique. Pressure = 10 justus; Duration = 112 sec.

## 2018-11-29 NOTE — DISCHARGE INSTRUCTIONS
DISCHARGE SUMMARY from Nurse    PATIENT INSTRUCTIONS:    After general anesthesia or intravenous sedation, for 24 hours or while taking prescription Narcotics:  · Limit your activities  · Do not drive and operate hazardous machinery  · Do not make important personal or business decisions  · Do  not drink alcoholic beverages  · If you have not urinated within 8 hours after discharge, please contact your surgeon on call. Report the following to your surgeon:  · Excessive pain, swelling, redness or odor of or around the surgical area  · Temperature over 100.5  · Nausea and vomiting lasting longer than 4 hours or if unable to take medications  · Any signs of decreased circulation or nerve impairment to extremity: change in color, persistent  numbness, tingling, coldness or increase pain  · Any questions    What to do at Home:  Recommended activity: No lifting, Driving, or Strenuous exercise for 24 hours. If you experience any of the following symptoms bleeding, swelling, acute pain or numbness, fever, please follow up with Dr. Manfred Garcia MD @ [phone]. *  Please give a list of your current medications to your Primary Care Provider. *  Please update this list whenever your medications are discontinued, doses are      changed, or new medications (including over-the-counter products) are added. *  Please carry medication information at all times in case of emergency situations. These are general instructions for a healthy lifestyle:    No smoking/ No tobacco products/ Avoid exposure to second hand smoke  Surgeon General's Warning:  Quitting smoking now greatly reduces serious risk to your health.     Obesity, smoking, and sedentary lifestyle greatly increases your risk for illness    A healthy diet, regular physical exercise & weight monitoring are important for maintaining a healthy lifestyle    You may be retaining fluid if you have a history of heart failure or if you experience any of the following symptoms:  Weight gain of 3 pounds or more overnight or 5 pounds in a week, increased swelling in our hands or feet or shortness of breath while lying flat in bed. Please call your doctor as soon as you notice any of these symptoms; do not wait until your next office visit. Recognize signs and symptoms of STROKE:    F-face looks uneven    A-arms unable to move or move unevenly    S-speech slurred or non-existent    T-time-call 911 as soon as signs and symptoms begin-DO NOT go       Back to bed or wait to see if you get better-TIME IS BRAIN. Warning Signs of HEART ATTACK     Call 911 if you have these symptoms:   Chest discomfort. Most heart attacks involve discomfort in the center of the chest that lasts more than a few minutes, or that goes away and comes back. It can feel like uncomfortable pressure, squeezing, fullness, or pain.  Discomfort in other areas of the upper body. Symptoms can include pain or discomfort in one or both arms, the back, neck, jaw, or stomach.  Shortness of breath with or without chest discomfort.  Other signs may include breaking out in a cold sweat, nausea, or lightheadedness. Don't wait more than five minutes to call 911 - MINUTES MATTER! Fast action can save your life. Calling 911 is almost always the fastest way to get lifesaving treatment. Emergency Medical Services staff can begin treatment when they arrive -- up to an hour sooner than if someone gets to the hospital by car. The discharge information has been reviewed with the patient. The patient verbalized understanding. Discharge medications reviewed with the patient and appropriate educational materials and side effects teaching were provided. ___________________________________________________________________________________________                                              Catheterization/Angiography Discharge Instructions    *Check the puncture site frequently for swelling or bleeding.  If you see any bleeding, lie down and apply pressure over the area with a clean town or washcloth. Notify your doctor for any redness, swelling, drainage or oozing from the puncture site. Notify your doctor for any fever or chills. *If the leg or arm with the puncture becomes cold, numb or painful, call Dr Joseph Gupta. Wilmer Ochoa MD at  [phone]. *Activity should be limited for the next 48 hours. Climb stairs as little as possible and avoid any stooping, bending or strenuous activity for 48 hours. No heavy lifting (anything over 10 pounds) for three days. *Do not drive for 48 hours. *You may resume your usual diet. Drink more fluids than usual.    *Have a responsible person drive you home and stay with you for at least 24 hours after your heart catheterization/angiography. *You may remove the bandage from your Right Groin in 24 hours. You may shower in 24 hours. No tub baths, hot tubs or swimming for one week. Do not place any lotions, creams, powders, ointments over the puncture site for one week. You may place a clean band-aid over the puncture site each day for 5 days. Change this daily. Patient armband removed and shredded  MyChart Activation    Thank you for requesting access to [employer]. Please follow the instructions below to securely access and download your online medical record. [employer] allows you to send messages to your doctor, view your test results, renew your prescriptions, schedule appointments, and more. How Do I Sign Up? 1. In your internet browser, go to [email or web address]. 2. Click on the First Time User? Click Here link in the Sign In box. You will see the New Member Sign Up page. 3. Enter your [employer] Access Code exactly as it appears below. You will not need to use this code after youve completed the sign-up process. If you do not sign up before the expiration date, you must request a new code.     [employer] Access Code: [email or web address]  Expires: 12/25/2018 10:34 AM (This is the date your [employer] access code will )    4. Enter the last four digits of your Social Security Number (xxxx) and Date of Birth (mm/dd/yyyy) as indicated and click Submit. You will be taken to the next sign-up page. 5. Create a [employer] ID. This will be your [employer] login ID and cannot be changed, so think of one that is secure and easy to remember. 6. Create a [employer] password. You can change your password at any time. 7. Enter your Password Reset Question and Answer. This can be used at a later time if you forget your password. 8. Enter your e-mail address. You will receive e-mail notification when new information is available in [address] 9. Click Sign Up. You can now view and download portions of your medical record. 10. Click the Download Summary menu link to download a portable copy of your medical information. Additional Information    If you have questions, please visit the Frequently Asked Questions section of the [employer] website at [email or web address] Remember, [employer] is NOT to be used for urgent needs.  For medical emergencies, dial 911.          ________________________________________

## 2018-11-29 NOTE — Clinical Note
Contrast Dose Calculator:  
Patient's age: 80.  
Patient's sex: Male. Patient weight (kg) = 86. Creatinine level (mg/dL) = 1.38. Creatinine clearance (mL/min): 48. Contrast concentration (mg/mL) = 300. MACD = 300 mL. Max Contrast dose per Creatinine Cl calculator = 108 mL.

## 2018-11-29 NOTE — INTERVAL H&P NOTE
H&P Update: 
Sofi Forrest was seen and examined. History and physical has been reviewed. The patient has been examined.  There have been no significant clinical changes since the completion of the originally dated History and Physical. 
 
Signed By: Evelyn Vargas MD   
 November 29, 2018 7:16 AM

## 2018-11-29 NOTE — ROUTINE PROCESS
A+Ox3, denied any complaints, right groin dressing intact, no bleeding or swelling. Able to stand and transfer with assistance. Discharge information reviewed with the patient and his daughter. Escorted with wheelchair to car, tot his daughter for transport home.

## 2018-11-29 NOTE — Clinical Note
Patient transported with a Cardiac Cath Tech / Patient Care Tech. Patient transported to: [address]   
Report to be given bedside

## 2018-11-29 NOTE — Clinical Note
Peripheral Lesion 2, atherectomy performed: rotational. Pass RPM = 90. Duration = 22 sec. Pass # = 2.

## 2018-11-29 NOTE — Clinical Note
Peripheral Lesion 1, atherectomy performed: rotational. Pass RPM = 60. Duration = 21 sec.  Pass # = 1.

## 2018-11-29 NOTE — Clinical Note
Diagnostic wire inserted. Final Anesthesia Post-op Assessment    Patient: Sanjuanita Romo  Procedure(s) Performed: ESOPHAGOGASTRODUODENOSCOPY  COLONOSCOPY WITH POSSIBLE POLYPECTOMYCOLONOSCOPY  ESOPHAGOGASTRODUODENOSCOPY  Anesthesia type: Monitor Anesthesia Care    Vital Last Value   Temperature 37.1 °C (98.8 °F) (05/09/18 0826)   Pulse 80 (05/09/18 0830)   Respiratory Rate 16 (05/09/18 0830)   Non-Invasive   Blood Pressure 194/79 (05/09/18 0830)   Arterial  Blood Pressure     Pulse Oximetry 95 % (05/09/18 0830)     Last 24 I/O:   Intake/Output Summary (Last 24 hours) at 05/09/18 0837  Last data filed at 05/09/18 0817   Gross per 24 hour   Intake              300 ml   Output                0 ml   Net              300 ml       PATIENT LOCATION: Phase II  POST-OP VITAL SIGNS: stable  LEVEL OF CONSCIOUSNESS: participates in exam, answers questions appropriately, awake, alert and oriented  RESPIRATORY STATUS: spontaneous ventilation  CARDIOVASCULAR: blood pressure returned to baseline and stable  HYDRATION: euvolemic    PAIN MANAGEMENT: adequately controlled  NAUSEA: None  AIRWAY PATENCY: patent  POST-OP ASSESSMENT: no complications, patient tolerated procedure well with no complications and sufficiently recovered from acute administration of anesthesia effects and able to participate in evaluation  COMPLICATIONS: none

## 2018-11-29 NOTE — Clinical Note
Vessel: left AA w/ Runoff, Power injection to the artery. Single view taken. PSI = 600. Rate of rise = 0.5 sec. Injection rate = 4 mL/sec. Total injected volume = 40 mL.

## 2018-11-29 NOTE — Clinical Note
Power injection to the Aortagram, artery. Single view taken. PSI = 1000. Rate of rise = 0.5 sec. Injection rate = 15 mL/sec. Total injected volume = 30 mL.

## 2018-11-29 NOTE — Clinical Note
Peripheral Lesion 1, atherectomy performed: rotational. Pass RPM = 120. Duration = 21 sec. Pass # = 3.

## 2018-11-29 NOTE — Clinical Note
Peripheral Lesion 2, atherectomy performed: rotational. Pass RPM = 60. Duration = 16 sec.  Pass # = 1.

## 2018-11-29 NOTE — Clinical Note
Bilateral groin prepped with ChloraPrep and draped. Wet prep solution applied at: 825. Wet prep solution dried at: 828. Wet prep elapsed drying time: 3 mins.

## 2018-12-13 ENCOUNTER — OFFICE VISIT (OUTPATIENT)
Dept: VASCULAR SURGERY | Age: 83
End: 2018-12-13

## 2018-12-13 VITALS
RESPIRATION RATE: 14 BRPM | BODY MASS INDEX: 26.24 KG/M2 | HEART RATE: 62 BPM | HEIGHT: 73 IN | DIASTOLIC BLOOD PRESSURE: 60 MMHG | WEIGHT: 198 LBS | SYSTOLIC BLOOD PRESSURE: 126 MMHG

## 2018-12-13 DIAGNOSIS — I73.9 PAD (PERIPHERAL ARTERY DISEASE) (HCC): ICD-10-CM

## 2018-12-13 DIAGNOSIS — I70.223 ATHEROSCLEROSIS OF NATIVE ARTERY OF BOTH LOWER EXTREMITIES WITH REST PAIN (HCC): Primary | ICD-10-CM

## 2018-12-13 DIAGNOSIS — G20 PARKINSON DISEASE (HCC): ICD-10-CM

## 2018-12-13 DIAGNOSIS — E11.51 TYPE 2 DIABETES MELLITUS WITH DIABETIC PERIPHERAL ANGIOPATHY WITHOUT GANGRENE, UNSPECIFIED WHETHER LONG TERM INSULIN USE (HCC): ICD-10-CM

## 2018-12-13 NOTE — PROGRESS NOTES
1. Have you been to an emergency room or urgent care clinic since your last visit? No  Hospitalized since your last visit? If yes, where, when, and reason for visit? No  2. Have you seen or consulted any other health care providers outside of the [facility] since your last visit including any procedures, health maintenance items. If yes, where, when and reason for visit?

## 2018-12-13 NOTE — PROGRESS NOTES
Timmy Dias    Chief Complaint   Patient presents with    Surgical Follow-up       History and Physical    Timmy Dias is a 80 y.o. gentleman with critical limb ischemia of the left lower extremity with ulceration of the left lower extremity at the medial malleolus involving the skin and subcutaneous tissues. Patient does also have Parkinson's disease and presents to the office today with his daughter in follow-up after left leg angiogram with atherectomy and balloon angioplasty of the superficial femoral artery and popliteal artery. Seems to be doing very well overall. He does not ambulate much and is mostly wheelchair-bound so claudication is difficult to assess. He does have chronic pain in his feet at nighttime and this is unchanged. He denies any open skin ulcers at this time. No fevers or chills.     Past Medical History:   Diagnosis Date    Benign prostatic hyperplasia with urinary retention     BPH (benign prostatic hyperplasia)     CAD (coronary artery disease)     Severe 3 vessel ( Cath 05/2014) Medical management    Colon cancer ([address]     CVA (cerebral infarction)     x2    Diabetes ([address]     High cholesterol     History of colonoscopy     HLD (hyperlipidemia)     Hypertension     Osteoarthritis     Parkinson's disease ([address]     Shortness of breath     Stroke ([address]     Tuberculosis of lung     Urinary retention      Past Surgical History:   Procedure Laterality Date    HX HEART CATHETERIZATION      severe 3 vessel- medical management 2014    HX ORTHOPAEDIC      R leg surgery    [facility]  10/25/2017    [facility]      Patient Active Problem List   Diagnosis Code    Acute bronchitis J20.9    Chest pain, unspecified R07.9    HTN (hypertension) I10    Parkinson disease ([address] G20    Pure hypercholesterolemia E78.00    DM (diabetes mellitus) ([address] E11.9    Acute coronary syndrome ([address] I24.9    Acute renal insufficiency N28.9    Coronary atherosclerosis of native coronary artery I25.10    BPH (benign prostatic hyperplasia) N40.0    Urinary retention R33.9    Partial small bowel obstruction (HCC) K56.600    Benign prostate hyperplasia N40.0    NSTEMI (non-ST elevated myocardial infarction) (HCC) I21.4    Benign prostatic hyperplasia with urinary retention N40.1, R33.8    CAD (coronary artery disease) I25.10    Colon cancer (HCC) C18.9    Diabetes (HCC) E11.9    High cholesterol E78.00    History of colonoscopy Z98.890    HLD (hyperlipidemia) E78.5    Osteoarthritis M19.90    Parkinson's disease [facility] G20    Shortness of breath R06.02    Stroke ([facility]) I63.9    Hypertension I10    Type 2 diabetes mellitus with diabetic neuropathy ([facility]) E11.40     Current Outpatient Medications   Medication Sig Dispense Refill    nitroglycerin (NITROSTAT) 0.4 mg SL tablet 1 Tab by SubLINGual route as needed for Chest Pain. 1 Bottle 3    finasteride (PROSCAR) 5 mg tablet Take 5 mg by mouth daily.  potassium chloride SR (KLOR-CON 10) 10 mEq tablet Take  by mouth.  glycopyrrolate (ROBINUL) 1 mg tablet Take 1 mg by mouth as needed. parkinson      isosorbide mononitrate ER (IMDUR) 60 mg CR tablet Take 1 Tab by mouth daily. (Patient taking differently: Take 60 mg by mouth nightly.) 30 Tab 6    ramipril (ALTACE) 10 mg capsule Take 1 Cap by mouth daily. (Patient taking differently: Take 5 mg by mouth daily.) 30 Cap 0    sitaGLIPtin (JANUVIA) 50 mg tablet Take 1 Tab by mouth daily. 30 Tab 0    docusate sodium (COLACE) 100 mg capsule Take 100 mg by mouth daily.  gabapentin (NEURONTIN) 600 mg tablet Take 600 mg by mouth nightly.  simvastatin (ZOCOR) 20 mg tablet Take 20 mg by mouth nightly.  tamsulosin (FLOMAX) 0.4 mg capsule Take 1 capsule by mouth daily (after dinner). 90 capsule 3    metoprolol (LOPRESSOR) 25 mg tablet Take 1 Tab by mouth two (2) times a day. 60 Tab 11    aspirin 81 mg chewable tablet Take 1 Tab by mouth daily.  (Patient taking differently: Take 81 mg by mouth daily. havent taken ASA in 6 months) 90 Tab 3    carbidopa-levodopa (SINEMET-25/250)  mg per tablet Take 1 Tab by mouth three (3) times daily. No Known Allergies    Physical Exam:    Visit Vitals  /60 (BP 1 Location: Left arm, BP Patient Position: Sitting)   Pulse 62   Resp 14   Ht 6' 1\" (1.854 m)   Wt 198 lb (89.8 kg)   BMI 26.12 kg/m²      General: Well-appearing elderly male in no acute distress. Patient is in a motorized wheelchair  HEENT: EOMI, no scleral icterus is noted. Pulmonary: No increased work or breathing is noted. Abdomen: nondistended. Extremities: Warm and well perfused bilaterally. Pt has mild bilateral lower extremity edema. He has no skin changes or ulcers the left lower extremity. Neuro: Cranial nerves II through XII are grossly intact   Integument: No ulcerations are identified visibly      Impression and Plan:  Raymundo Frank is a 80 y.o. male with severe peripheral arterial disease of the bilateral lower extremities. Reportedly he did have an ulcer on the left lower extremity and did undergo angiogram with intervention as above. He has no open areas at this current time. He does seem to be doing well overall. He does not ambulate and therefore does not complain of claudication. He does not have any rest pain currently but does complain of pain in his feet at nighttime. This is unchanged. I discussed with patient and his daughter that we will obtain follow-up arterial studies in the next 6-8 weeks to reassess. There is certainly understanding to call the office sooner as needed. Plan was discussed. Patient expresses understanding and agrees. Follow-up Disposition:  Return in about 2 months (around 2/13/2019). Kaelyn Joyner  [phone]        PLEASE NOTE:  This document has been produced using voice recognition software. Unrecognized errors in transcription may be present.

## 2019-02-13 ENCOUNTER — OFFICE VISIT (OUTPATIENT)
Dept: VASCULAR SURGERY | Age: 84
End: 2019-02-13

## 2019-02-13 ENCOUNTER — CLINICAL SUPPORT (OUTPATIENT)
Dept: VASCULAR SURGERY | Age: 84
End: 2019-02-13

## 2019-02-13 VITALS
HEART RATE: 70 BPM | RESPIRATION RATE: 17 BRPM | HEIGHT: 73 IN | WEIGHT: 198 LBS | SYSTOLIC BLOOD PRESSURE: 110 MMHG | BODY MASS INDEX: 26.24 KG/M2 | DIASTOLIC BLOOD PRESSURE: 72 MMHG

## 2019-02-13 DIAGNOSIS — G20 PARKINSON'S DISEASE (HCC): ICD-10-CM

## 2019-02-13 DIAGNOSIS — Z99.3 WHEELCHAIR BOUND: ICD-10-CM

## 2019-02-13 DIAGNOSIS — I73.9 PAD (PERIPHERAL ARTERY DISEASE) (HCC): ICD-10-CM

## 2019-02-13 DIAGNOSIS — I73.9 PAD (PERIPHERAL ARTERY DISEASE) (HCC): Primary | ICD-10-CM

## 2019-02-13 LAB
LEFT ABI: 0.53
LEFT ANTERIOR TIBIAL: 78 MMHG
LEFT ARM BP: 144 MMHG
LEFT CFA DIST SYS PSV: 75.9 CM/S
LEFT DIST ATA VELOCITY: 21.5 CM/S
LEFT DIST PTA PSV: 0 CM/S
LEFT PERONEAL DIST VELOCITY: 25.8 CM/S
LEFT POP A DIST VEL RATIO: 0.3
LEFT POP A MID VEL RATIO: 0.29
LEFT POP A PROX VEL RATIO: 6.6
LEFT POPLITEAL DIST SYS PSV: 23.6 CM/S
LEFT POPLITEAL MID SYS PSV: 78.5 CM/S
LEFT POPLITEAL PROX SYS PSV: 269.9 CM/S
LEFT POSTERIOR TIBIAL: 0 MMHG
LEFT PROX PFA A PSV: 42.6 CM/S
LEFT SFA DIST VEL RATIO: 0.78
LEFT SFA MID VEL RATIO: 0.62
LEFT SFA PROX VEL RATIO: 1.11
LEFT SUPER FEMORAL DIST SYS PSV: 40.9 CM/S
LEFT SUPER FEMORAL MID SYS PSV: 52.3 CM/S
LEFT SUPER FEMORAL PROX SYS PSV: 84 CM/S
LEFT TBI: 0.27
LEFT TOE PRESSURE: 40 MMHG
RIGHT ABI: 0.84
RIGHT ANTERIOR TIBIAL: 123 MMHG
RIGHT ARM BP: 146 MMHG
RIGHT CFA DIST SYS PSV: 71.1 CM/S
RIGHT CFA PROX SYS PSV: 76.2 CM/S
RIGHT DIST PTA PSV: 16.9 CM/S
RIGHT PERONEAL DIST VELOCITY: 64.7 CM/S
RIGHT PERONEAL MID SYS PSV: 15.8 CM/S
RIGHT POP A DIST VEL RATIO: 1.41
RIGHT POP A MID VEL RATIO: 0.73
RIGHT POP A PROX VEL RATIO: 0.91
RIGHT POPLITEAL DIST SYS PSV: 71 CM/S
RIGHT POPLITEAL MID SYS PSV: 50.5 CM/S
RIGHT POPLITEAL PROX SYS PSV: 69.3 CM/S
RIGHT POSTERIOR TIBIAL: 116 MMHG
RIGHT SFA DIST VEL RATIO: 1.16
RIGHT SFA MID VEL RATIO: 0.8
RIGHT SFA PROX VEL RATIO: 1.1
RIGHT SUPER FEMORAL DIST SYS PSV: 76.2 CM/S
RIGHT SUPER FEMORAL MID SYS PSV: 65.8 CM/S
RIGHT SUPER FEMORAL PROX SYS PSV: 81.4 CM/S
RIGHT TBI: 0.42
RIGHT TOE PRESSURE: 62 MMHG

## 2019-02-13 NOTE — PROGRESS NOTES
1. Have you been to an emergency room or urgent care clinic since your last visit? NO    Hospitalized since your last visit? If yes, where, when, and reason for visit? NO  2. Have you seen or consulted any other health care providers outside of the [facility] since your last visit including any procedures, health maintenance items. If yes, where, when and reason for visit?  NO

## 2019-02-13 NOTE — PROGRESS NOTES
Roni Collazo    Chief Complaint   Patient presents with    Leg Pain       History and Physical    Roni Collazo is a 80 y.o. gentleman with history of critical limb ischemia of the left lower extremity with history of ulceration of the left lower extremity. Patient does also have Parkinson's disease and presents to the office today with his daughter in follow-up. He is s/p left leg angiogram with atherectomy and balloon angioplasty of the superficial femoral artery and popliteal artery in November of last year. He is doing very well overall. He does not ambulate much and is mostly wheelchair-bound so claudication is difficult to assess. He did have a fall in the bathroom during a trip [place] [employer] weekend. He did not suffer any injuries thankfully. He states if he is not home he only uses his motorized wheelchair as he is too unstable. He has not complaining of any rest pain at this time. No temperature changes. He has no open wounds at this time. No skin changes reported. No fevers or chills. Arterial studies done in our office today show moderate arterial disease noted within the right lower extremity at rest. Severe arterial disease noted within the left lower extremity at rest. Right lower extremity disease appears to be within aortoiliac and femoral-popliteal segments as well as tibial segment. Left lower extremity disease is located within the femoral-popliteal and tibial segments.       Past Medical History:   Diagnosis Date    Benign prostatic hyperplasia with urinary retention     BPH (benign prostatic hyperplasia)     CAD (coronary artery disease)     Severe 3 vessel ( Cath 05/2014) Medical management    Colon cancer [facility])     CVA (cerebral infarction)     x2    Diabetes ([address]     High cholesterol     History of colonoscopy     HLD (hyperlipidemia)     Hypertension     Osteoarthritis     Parkinson's disease ([address]     Shortness of breath     Stroke ([address]     Tuberculosis of lung     Urinary retention      Past Surgical History:   Procedure Laterality Date    HX HEART CATHETERIZATION      severe 3 vessel- medical management 2014    HX ORTHOPAEDIC      R leg surgery    HX UROLOGICAL  10/25/2017    [facility]      Patient Active Problem List   Diagnosis Code    Acute bronchitis J20.9    Chest pain, unspecified R07.9    HTN (hypertension) I10    Parkinson disease ([facility] G20    Pure hypercholesterolemia E78.00    DM (diabetes mellitus) ([facility] E11.9    Acute coronary syndrome ([facility] I24.9    Acute renal insufficiency N28.9    Coronary atherosclerosis of native coronary artery I25.10    BPH (benign prostatic hyperplasia) N40.0    Urinary retention R33.9    Partial small bowel obstruction (HCC) K56.600    Benign prostate hyperplasia N40.0    NSTEMI (non-ST elevated myocardial infarction) ([facility]) I21.4    Benign prostatic hyperplasia with urinary retention N40.1, R33.8    CAD (coronary artery disease) I25.10    Colon cancer ([facility]) C18.9    Diabetes ([facility]) E11.9    High cholesterol E78.00    History of colonoscopy Z98.890    HLD (hyperlipidemia) E78.5    Osteoarthritis M19.90    Parkinson's disease ([facility] G20    Shortness of breath R06.02    Stroke ([facility]) I63.9    Hypertension I10    Type 2 diabetes mellitus with diabetic neuropathy ([facility]) E11.40     Current Outpatient Medications   Medication Sig Dispense Refill    nitroglycerin (NITROSTAT) 0.4 mg SL tablet 1 Tab by SubLINGual route as needed for Chest Pain. 1 Bottle 3    finasteride (PROSCAR) 5 mg tablet Take 5 mg by mouth daily.  potassium chloride SR (KLOR-CON 10) 10 mEq tablet Take  by mouth.  glycopyrrolate (ROBINUL) 1 mg tablet Take 1 mg by mouth as needed. parkinson      isosorbide mononitrate ER (IMDUR) 60 mg CR tablet Take 1 Tab by mouth daily. (Patient taking differently: Take 60 mg by mouth nightly.) 30 Tab 6    ramipril (ALTACE) 10 mg capsule Take 1 Cap by mouth daily.  (Patient taking differently: Take 5 mg by mouth daily.) 30 Cap 0    sitaGLIPtin (JANUVIA) 50 mg tablet Take 1 Tab by mouth daily. 30 Tab 0    docusate sodium (COLACE) 100 mg capsule Take 100 mg by mouth daily.  gabapentin (NEURONTIN) 600 mg tablet Take 600 mg by mouth nightly.  simvastatin (ZOCOR) 20 mg tablet Take 20 mg by mouth nightly.  tamsulosin (FLOMAX) 0.4 mg capsule Take 1 capsule by mouth daily (after dinner). 90 capsule 3    metoprolol (LOPRESSOR) 25 mg tablet Take 1 Tab by mouth two (2) times a day. 60 Tab 11    aspirin 81 mg chewable tablet Take 1 Tab by mouth daily. (Patient taking differently: Take 81 mg by mouth daily. havent taken ASA in 6 months) 90 Tab 3    carbidopa-levodopa (SINEMET-25/250)  mg per tablet Take 1 Tab by mouth three (3) times daily. No Known Allergies    Physical Exam:    Visit Vitals  /72 (BP 1 Location: Left arm, BP Patient Position: Sitting)   Pulse 70   Resp 17   Ht 6' 1\" (1.854 m)   Wt 198 lb (89.8 kg)   BMI 26.12 kg/m²      General: Well-appearing elderly male in no acute distress. Patient is in a motorized wheelchair  HEENT: EOMI, no scleral icterus is noted. Pulmonary: No increased work or breathing is noted. Abdomen: nondistended. Extremities: Warm and well perfused bilaterally. Pt has mild bilateral lower extremity edema. He has no skin changes or ulcers the left lower extremity. Neuro: Cranial nerves II through XII are grossly intact   Integument: No ulcerations are identified visibly      Impression and Plan:  Sparkle Mazariegos is a 80 y.o. male with moderate-severe peripheral arterial disease of the bilateral lower extremities. He currently has no skin changes or open ulcers. He does seem to be doing well overall. He does not ambulate and therefore does not complain of claudication. He does not have any rest pain currently.   I discussed with patient and his daughter that we will continue to monitor him closely with routine surveillance and we will obtain follow-up arterial studies in 6 months. He will f/u afterwards. They were educated on the signs and symptoms of worsening arterial insufficiency and will call the office sooner as needed. Plan was discussed. Patient expresses understanding and agrees. Follow-up Disposition:  Return in about 6 months (around 8/13/2019). Yang Joyner  [phone]        PLEASE NOTE:  This document has been produced using voice recognition software. Unrecognized errors in transcription may be present.

## 2019-04-07 ENCOUNTER — APPOINTMENT (OUTPATIENT)
Dept: CT IMAGING | Age: 84
End: 2019-04-07
Attending: EMERGENCY MEDICINE
Payer: MEDICARE

## 2019-04-07 ENCOUNTER — HOSPITAL ENCOUNTER (EMERGENCY)
Age: 84
Discharge: HOME | End: 2019-04-07
Attending: EMERGENCY MEDICINE
Payer: MEDICARE

## 2019-04-07 VITALS
BODY MASS INDEX: 21.62 KG/M2 | RESPIRATION RATE: 18 BRPM | WEIGHT: 151 LBS | HEART RATE: 57 BPM | SYSTOLIC BLOOD PRESSURE: 161 MMHG | TEMPERATURE: 97.3 F | DIASTOLIC BLOOD PRESSURE: 77 MMHG | OXYGEN SATURATION: 100 % | HEIGHT: 70 IN

## 2019-04-07 DIAGNOSIS — S09.90XA INJURY OF HEAD, INITIAL ENCOUNTER: ICD-10-CM
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
## 2019-04-07 LAB
ALBUMIN SERPL-MCNC: 3.7 G/DL (ref 3.4–5)
ALBUMIN/GLOB SERPL: 1.2 {RATIO} (ref 0.8–1.7)
ALP SERPL-CCNC: 133 U/L (ref 45–117)
ALT SERPL-CCNC: 15 U/L (ref 16–61)
ANION GAP SERPL CALC-SCNC: 4 MMOL/L (ref 3–18)
AST SERPL-CCNC: 13 U/L (ref 15–37)
ATRIAL RATE: 56 BPM
BASOPHILS # BLD: 0 K/UL (ref 0–0.1)
BASOPHILS NFR BLD: 0 % (ref 0–2)
BILIRUB SERPL-MCNC: 0.6 MG/DL (ref 0.2–1)
BUN SERPL-MCNC: 14 MG/DL (ref 7–18)
BUN/CREAT SERPL: 11 (ref 12–20)
CALCIUM SERPL-MCNC: 8.5 MG/DL (ref 8.5–10.1)
CALCULATED P AXIS, ECG09: -8 DEGREES
CALCULATED R AXIS, ECG10: 93 DEGREES
CALCULATED T AXIS, ECG11: 59 DEGREES
CHLORIDE SERPL-SCNC: 103 MMOL/L (ref 100–108)
CK MB CFR SERPL CALC: 3 % (ref 0–4)
CK MB SERPL-MCNC: 2.6 NG/ML (ref 5–25)
CK SERPL-CCNC: 87 U/L (ref 39–308)
CO2 SERPL-SCNC: 33 MMOL/L (ref 21–32)
CREAT SERPL-MCNC: 1.25 MG/DL (ref 0.6–1.3)
DIAGNOSIS, 93000: NORMAL
DIFFERENTIAL METHOD BLD: ABNORMAL
EOSINOPHIL # BLD: 0.2 K/UL (ref 0–0.4)
EOSINOPHIL NFR BLD: 2 % (ref 0–5)
ERYTHROCYTE [DISTWIDTH] IN BLOOD BY AUTOMATED COUNT: 14.1 % (ref 11.6–14.5)
GLOBULIN SER CALC-MCNC: 3.1 G/DL (ref 2–4)
GLUCOSE SERPL-MCNC: 104 MG/DL (ref 74–99)
HCT VFR BLD AUTO: 44.3 % (ref 36–48)
HGB BLD-MCNC: 14.1 G/DL (ref 13–16)
LYMPHOCYTES # BLD: 2.9 K/UL (ref 0.9–3.6)
LYMPHOCYTES NFR BLD: 25 % (ref 21–52)
MCH RBC QN AUTO: 28.5 PG (ref 24–34)
MCHC RBC AUTO-ENTMCNC: 31.8 G/DL (ref 31–37)
MCV RBC AUTO: 89.5 FL (ref 74–97)
MONOCYTES # BLD: 1 K/UL (ref 0.05–1.2)
MONOCYTES NFR BLD: 8 % (ref 3–10)
NEUTS SEG # BLD: 7.5 K/UL (ref 1.8–8)
NEUTS SEG NFR BLD: 65 % (ref 40–73)
P-R INTERVAL, ECG05: 180 MS
PLATELET # BLD AUTO: 242 K/UL (ref 135–420)
PMV BLD AUTO: 9.1 FL (ref 9.2–11.8)
POTASSIUM SERPL-SCNC: 4 MMOL/L (ref 3.5–5.5)
PROT SERPL-MCNC: 6.8 G/DL (ref 6.4–8.2)
Q-T INTERVAL, ECG07: 430 MS
QRS DURATION, ECG06: 92 MS
QTC CALCULATION (BEZET), ECG08: 414 MS
RBC # BLD AUTO: 4.95 M/UL (ref 4.7–5.5)
SODIUM SERPL-SCNC: 140 MMOL/L (ref 136–145)
TROPONIN I SERPL-MCNC: <0.02 NG/ML (ref 0–0.04)
VENTRICULAR RATE, ECG03: 56 BPM
WBC # BLD AUTO: 11.5 K/UL (ref 4.6–13.2)

## 2019-04-07 PROCEDURE — 93005 ELECTROCARDIOGRAM TRACING: CPT

## 2019-04-07 PROCEDURE — 74011250636 HC RX REV CODE- 250/636: Performed by: EMERGENCY MEDICINE

## 2019-04-07 PROCEDURE — 96360 HYDRATION IV INFUSION INIT: CPT

## 2019-04-07 PROCEDURE — 74011000250 HC RX REV CODE- 250: Performed by: EMERGENCY MEDICINE

## 2019-04-07 PROCEDURE — 90715 TDAP VACCINE 7 YRS/> IM: CPT | Performed by: EMERGENCY MEDICINE

## 2019-04-07 PROCEDURE — 99284 EMERGENCY DEPT VISIT MOD MDM: CPT

## 2019-04-07 PROCEDURE — 70450 CT HEAD/BRAIN W/O DYE: CPT

## 2019-04-07 PROCEDURE — 82550 ASSAY OF CK (CPK): CPT

## 2019-04-07 PROCEDURE — 90471 IMMUNIZATION ADMIN: CPT

## 2019-04-07 PROCEDURE — 80053 COMPREHEN METABOLIC PANEL: CPT

## 2019-04-07 PROCEDURE — 85025 COMPLETE CBC W/AUTO DIFF WBC: CPT

## 2019-04-07 RX ORDER — BACITRACIN 500 UNIT/G
1 PACKET (EA) TOPICAL 3 TIMES DAILY
Status: DISCONTINUED | OUTPATIENT
Start: 2019-04-07 | End: 2019-04-07

## 2019-04-07 RX ORDER — BACITRACIN 500 UNIT/G
1 PACKET (EA) TOPICAL
Status: COMPLETED | OUTPATIENT
Start: 2019-04-07 | End: 2019-04-07

## 2019-04-07 RX ADMIN — SODIUM CHLORIDE 500 ML: 900 INJECTION, SOLUTION INTRAVENOUS at 11:32

## 2019-04-07 RX ADMIN — TETANUS TOXOID, REDUCED DIPHTHERIA TOXOID AND ACELLULAR PERTUSSIS VACCINE, ADSORBED 0.5 ML: 5; 2.5; 8; 8; 2.5 SUSPENSION INTRAMUSCULAR at 11:56

## 2019-04-07 RX ADMIN — BACITRACIN 1 PACKET: 500 OINTMENT TOPICAL at 12:18

## 2019-04-07 NOTE — ED TRIAGE NOTES
Patient arrived via EMS. Patient was found on floor by family. States he fell getting out of bed today

## 2019-04-07 NOTE — ED NOTES
I have reviewed discharge instructions with the patient. The patient verbalized understanding. Patient armband removed and shredded. Assisted patient ot wheelchair and out of care area

## 2019-04-07 NOTE — ED PROVIDER NOTES
EMERGENCY DEPARTMENT HISTORY AND PHYSICAL EXAM 
 
11:19 AM 
 
 
Date: 4/7/2019 Patient Name: Cam Spicer History of Presenting Illness Chief Complaint Patient presents with  Fall History Provided By: Patient and EMS Additional History (Context): Cam Spicer is a 80 y.o. male who presents with chief complaint of unwitnessed fall. Patient is able to give history states that he was trying to get out of bed and put his pants on when he fell hit the ground denies losing any consciousness states he was not able to get up under his own power and his family came and found him on the ground. Per patient this is approximately an hour to an hour and a half after he fell. Patient denies any headache neck pain hip pain or trauma secondary to the fall. He denies any chest pain palpitations prior to the fall is complaining of pain to an abrasion to his forehead. Cesar Walker PCP: Mecca Yi MD 
 
 
Current Facility-Administered Medications Medication Dose Route Frequency Provider Last Rate Last Dose  bacitracin 500 unit/gram packet 1 Packet  1 Packet Topical TID Giorgio Guardado MD      
 sodium chloride 0.9 % bolus infusion 500 mL  500 mL IntraVENous ONCE Giorgio Guardado  mL/hr at 04/07/19 1132 500 mL at 04/07/19 1132 Current Outpatient Medications Medication Sig Dispense Refill  nitroglycerin (NITROSTAT) 0.4 mg SL tablet 1 Tab by SubLINGual route as needed for Chest Pain. 1 Bottle 3  
 finasteride (PROSCAR) 5 mg tablet Take 5 mg by mouth daily.  potassium chloride SR (KLOR-CON 10) 10 mEq tablet Take  by mouth.  glycopyrrolate (ROBINUL) 1 mg tablet Take 1 mg by mouth as needed. parkinson  isosorbide mononitrate ER (IMDUR) 60 mg CR tablet Take 1 Tab by mouth daily. (Patient taking differently: Take 60 mg by mouth nightly.) 30 Tab 6  
 ramipril (ALTACE) 10 mg capsule Take 1 Cap by mouth daily.  (Patient taking differently: Take 5 mg by mouth daily.) 30 Cap 0  
  sitaGLIPtin (JANUVIA) 50 mg tablet Take 1 Tab by mouth daily. 30 Tab 0  
 docusate sodium (COLACE) 100 mg capsule Take 100 mg by mouth daily.  gabapentin (NEURONTIN) 600 mg tablet Take 600 mg by mouth nightly.  simvastatin (ZOCOR) 20 mg tablet Take 20 mg by mouth nightly.  tamsulosin (FLOMAX) 0.4 mg capsule Take 1 capsule by mouth daily (after dinner). 90 capsule 3  
 metoprolol (LOPRESSOR) 25 mg tablet Take 1 Tab by mouth two (2) times a day. 60 Tab 11  
 aspirin 81 mg chewable tablet Take 1 Tab by mouth daily. (Patient taking differently: Take 81 mg by mouth daily. havent taken ASA in 6 months) 90 Tab 3  carbidopa-levodopa (SINEMET-25/250)  mg per tablet Take 1 Tab by mouth three (3) times daily. Past History Past Medical History: 
Past Medical History:  
Diagnosis Date  Benign prostatic hyperplasia with urinary retention  BPH (benign prostatic hyperplasia)  CAD (coronary artery disease) Severe 3 vessel ( Cath 05/2014) Medical management  Colon cancer ([address]  CVA (cerebral infarction) x2  
 Diabetes ([address]  High cholesterol  History of colonoscopy  HLD (hyperlipidemia)  Hypertension  Osteoarthritis  Parkinson's disease ([address]  Shortness of breath  Stroke ([address]  Tuberculosis of lung  Urinary retention Past Surgical History: 
Past Surgical History:  
Procedure Laterality Date  HX HEART CATHETERIZATION    
 severe 3 vessel- medical management 2014  HX ORTHOPAEDIC    
 R leg surgery  HX UROLOGICAL  10/25/2017 [facility] Family History: 
Family History Problem Relation Age of Onset  Heart Disease Mother  Hypertension Mother  Hypertension Father  Heart Disease Father  Cancer Sister  Heart Disease Sister  Hypertension Sister Social History: 
Social History Tobacco Use  Smoking status: Never Smoker  Smokeless tobacco: Never Used Substance Use Topics  Alcohol use: No  
 Drug use: No  
 
 
Allergies: 
No Known Allergies Review of Systems Review of Systems Constitutional: Negative for activity change, chills, diaphoresis, fatigue and fever. Eyes: Negative for visual disturbance. Respiratory: Negative for chest tightness and shortness of breath. Cardiovascular: Negative for chest pain. Gastrointestinal: Negative for abdominal pain, nausea and vomiting. Skin: Negative for rash. Neurological: Positive for weakness (at baseline with parkinsons ). Negative for dizziness, seizures, syncope, numbness and headaches. All other systems reviewed and are negative. Physical Exam  
 
Visit Vitals /77 Pulse (!) 57 Temp 97.3 °F (36.3 °C) Resp 18 Ht 5' 10\" (1.778 m) Wt 68.5 kg (151 lb) SpO2 100% BMI 21.67 kg/m² Physical Exam  
Constitutional: He is oriented to person, place, and time. He appears well-developed and well-nourished. No distress. HENT:  
Head: Normocephalic. Mouth/Throat: Oropharynx is clear and moist.  
Abrasion of forehead Eyes: Pupils are equal, round, and reactive to light. Conjunctivae and EOM are normal. No scleral icterus. Neck: Normal range of motion. Neck supple. No c spine tenderness Cardiovascular: Normal rate, regular rhythm and normal heart sounds. No murmur heard. Pulmonary/Chest: Effort normal and breath sounds normal. No respiratory distress. Abdominal: Soft. Bowel sounds are normal. He exhibits no distension. There is no tenderness. Musculoskeletal: He exhibits no edema. Lymphadenopathy:  
  He has no cervical adenopathy. Neurological: He is alert and oriented to person, place, and time. Coordination normal.  
Skin: Skin is warm and dry. No rash noted. Psychiatric: He has a normal mood and affect. His behavior is normal.  
Nursing note and vitals reviewed. Diagnostic Study Results Labs - 
 Recent Results (from the past 12 hour(s)) EKG, 12 LEAD, INITIAL Collection Time: 04/07/19 11:19 AM  
Result Value Ref Range Ventricular Rate 56 BPM  
 Atrial Rate 56 BPM  
 P-R Interval 180 ms QRS Duration 92 ms Q-T Interval 430 ms QTC Calculation (Bezet) 414 ms Calculated P Axis -8 degrees Calculated R Axis 93 degrees Calculated T Axis 59 degrees Diagnosis Sinus bradycardia Rightward axis Possible Anteroseptal infarct (cited on or before 07-APR-2019) Abnormal ECG When compared with ECG of 18-DEC-2016 20:52, 
premature supraventricular complexes are no longer present Vent. rate has decreased BY  45 BPM 
QRS axis shifted right Questionable change in initial forces of Septal leads ST elevation has replaced ST depression in Inferior leads CBC WITH AUTOMATED DIFF Collection Time: 04/07/19 11:25 AM  
Result Value Ref Range WBC 11.5 4.6 - 13.2 K/uL  
 RBC 4.95 4.70 - 5.50 M/uL  
 HGB 14.1 13.0 - 16.0 g/dL HCT 44.3 36.0 - 48.0 % MCV 89.5 74.0 - 97.0 FL  
 MCH 28.5 24.0 - 34.0 PG  
 MCHC 31.8 31.0 - 37.0 g/dL  
 RDW 14.1 11.6 - 14.5 % PLATELET 074 779 - 268 K/uL MPV 9.1 (L) 9.2 - 11.8 FL  
 NEUTROPHILS 65 40 - 73 % LYMPHOCYTES 25 21 - 52 % MONOCYTES 8 3 - 10 % EOSINOPHILS 2 0 - 5 % BASOPHILS 0 0 - 2 %  
 ABS. NEUTROPHILS 7.5 1.8 - 8.0 K/UL  
 ABS. LYMPHOCYTES 2.9 0.9 - 3.6 K/UL  
 ABS. MONOCYTES 1.0 0.05 - 1.2 K/UL  
 ABS. EOSINOPHILS 0.2 0.0 - 0.4 K/UL  
 ABS. BASOPHILS 0.0 0.0 - 0.1 K/UL  
 DF AUTOMATED METABOLIC PANEL, COMPREHENSIVE Collection Time: 04/07/19 11:25 AM  
Result Value Ref Range Sodium 140 136 - 145 mmol/L Potassium 4.0 3.5 - 5.5 mmol/L Chloride 103 100 - 108 mmol/L  
 CO2 33 (H) 21 - 32 mmol/L Anion gap 4 3.0 - 18 mmol/L Glucose 104 (H) 74 - 99 mg/dL BUN 14 7.0 - 18 MG/DL Creatinine 1.25 0.6 - 1.3 MG/DL  
 BUN/Creatinine ratio 11 (L) 12 - 20 GFR est AA >60 >60 ml/min/1.73m2 GFR est non-AA 55 (L) >60 ml/min/1.73m2 Calcium 8.5 8.5 - 10.1 MG/DL Bilirubin, total 0.6 0.2 - 1.0 MG/DL  
 ALT (SGPT) 15 (L) 16 - 61 U/L  
 AST (SGOT) 13 (L) 15 - 37 U/L Alk. phosphatase 133 (H) 45 - 117 U/L Protein, total 6.8 6.4 - 8.2 g/dL Albumin 3.7 3.4 - 5.0 g/dL Globulin 3.1 2.0 - 4.0 g/dL A-G Ratio 1.2 0.8 - 1.7 CARDIAC PANEL,(CK, CKMB & TROPONIN) Collection Time: 04/07/19 11:25 AM  
Result Value Ref Range CK 87 39 - 308 U/L  
 CK - MB 2.6 <3.6 ng/ml CK-MB Index 3.0 0.0 - 4.0 % Troponin-I, QT <0.02 0.0 - 0.045 NG/ML Radiologic Studies -  
CT HEAD WO CONT Final Result IMPRESSION:  
  
  
1. Bilateral chronic lacunar infarcts in the basal ganglia. Intracranially,  
nothing acute, negative for acute trauma. 2. Right forehead scalp hematoma /contusion. 3. Mild bilateral ethmoid sinus inflammatory disease, age-indeterminate. Medical Decision Making I am the first provider for this patient. I reviewed the vital signs, available nursing notes, past medical history, past surgical history, family history and social history. Vital Signs-Reviewed the patient's vital signs. EKG: Sinus bradycardia rate of 56 with no acute ST-T wave changes time read 11:19 Records Reviewed: Nursing Notes and Old Medical Records (Time of Review: 11:19 AM) ED Course: Progress Notes, Reevaluation, and Consults: 
 
 
Provider Notes (Medical Decision Making): MDM Number of Diagnoses or Management Options Fall, initial encounter:  
Injury of head, initial encounter:  
Diagnosis management comments: Fall unable to get up from bed on his own power. Abrasion to forehead no other acute injury noted. Patient at baseline uses an electric wheelchair for ambulation believe he is at his baseline currently. 12:12 PM 
Pt alert at baseline Ct and labs reviewed will dc home Amount and/or Complexity of Data Reviewed Clinical lab tests: ordered and reviewed Tests in the radiology section of CPT®: ordered and reviewed Risk of Complications, Morbidity, and/or Mortality Presenting problems: high Diagnostic procedures: moderate Management options: moderate Diagnosis Clinical Impression: 1. Fall, initial encounter 2. Injury of head, initial encounter Disposition: home Follow-up Information Follow up With Specialties Details Why Contact Info [facility] EMERGENCY DEPT Emergency Medicine  As needed, If symptoms worsen [address] 
[phone] Crispin Reyes MD Internal Medicine Schedule an appointment as soon as possible for a visit for ED follow up appointment  [address] [facility] 
[phone] Patient's Medications Start Taking No medications on file Continue Taking ASPIRIN 81 MG CHEWABLE TABLET    Take 1 Tab by mouth daily. CARBIDOPA-LEVODOPA (SINEMET-25/250)  MG PER TABLET    Take 1 Tab by mouth three (3) times daily. DOCUSATE SODIUM (COLACE) 100 MG CAPSULE    Take 100 mg by mouth daily. FINASTERIDE (PROSCAR) 5 MG TABLET    Take 5 mg by mouth daily. GABAPENTIN (NEURONTIN) 600 MG TABLET    Take 600 mg by mouth nightly. GLYCOPYRROLATE (ROBINUL) 1 MG TABLET    Take 1 mg by mouth as needed. parkinson ISOSORBIDE MONONITRATE ER (IMDUR) 60 MG CR TABLET    Take 1 Tab by mouth daily. METOPROLOL (LOPRESSOR) 25 MG TABLET    Take 1 Tab by mouth two (2) times a day. NITROGLYCERIN (NITROSTAT) 0.4 MG SL TABLET    1 Tab by SubLINGual route as needed for Chest Pain. POTASSIUM CHLORIDE SR (KLOR-CON 10) 10 MEQ TABLET    Take  by mouth. RAMIPRIL (ALTACE) 10 MG CAPSULE    Take 1 Cap by mouth daily. SIMVASTATIN (ZOCOR) 20 MG TABLET    Take 20 mg by mouth nightly. SITAGLIPTIN (JANUVIA) 50 MG TABLET    Take 1 Tab by mouth daily. TAMSULOSIN (FLOMAX) 0.4 MG CAPSULE    Take 1 capsule by mouth daily (after dinner). These Medications have changed No medications on file Stop Taking No medications on file  
 
_______________________________ Please note that this dictation was completed with DGIT, the computer voice recognition software. Quite often unanticipated grammatical, syntax, homophones, and other interpretive errors are inadvertently transcribed by the computer software. Please disregard these errors. Please excuse any errors that have escaped final proofreading.

## 2019-04-07 NOTE — DISCHARGE INSTRUCTIONS
Please hold any aspirin for the next 3 days . Patient Education        Preventing Falls: Care Instructions  Your Care Instructions    Getting around your home safely can be a challenge if you have injuries or health problems that make it easy for you to fall. Loose rugs and furniture in walkways are among the dangers for many older people who have problems walking or who have poor eyesight. People who have conditions such as arthritis, osteoporosis, or dementia also have to be careful not to fall. You can make your home safer with a few simple measures. Follow-up care is a key part of your treatment and safety. Be sure to make and go to all appointments, and call your doctor if you are having problems. It's also a good idea to know your test results and keep a list of the medicines you take. How can you care for yourself at home? Taking care of yourself  · You may get dizzy if you do not drink enough water. To prevent dehydration, drink plenty of fluids, enough so that your urine is light yellow or clear like water. Choose water and other caffeine-free clear liquids. If you have kidney, heart, or liver disease and have to limit fluids, talk with your doctor before you increase the amount of fluids you drink. · Exercise regularly to improve your strength, muscle tone, and balance. Walk if you can. Swimming may be a good choice if you cannot walk easily. · Have your vision and hearing checked each year or any time you notice a change. If you have trouble seeing and hearing, you might not be able to avoid objects and could lose your balance. · Know the side effects of the medicines you take. Ask your doctor or pharmacist whether the medicines you take can affect your balance. Sleeping pills or sedatives can affect your balance. · Limit the amount of alcohol you drink. Alcohol can impair your balance and other senses. · Ask your doctor whether calluses or corns on your feet need to be removed.  If you wear loose-fitting shoes because of calluses or corns, you can lose your balance and fall. · Talk to your doctor if you have numbness in your feet. Preventing falls at home  · Remove raised doorway thresholds, throw rugs, and clutter. Repair loose carpet or raised areas in the floor. · Move furniture and electrical cords to keep them out of walking paths. · Use nonskid floor wax, and wipe up spills right away, especially on ceramic tile floors. · If you use a walker or cane, put rubber tips on it. If you use crutches, clean the bottoms of them regularly with an abrasive pad, such as steel wool. · Keep your house well lit, especially [facility], and outside walkways. Use night-lights in areas such as hallways and bathrooms. Add extra light switches or use remote switches (such as switches that go on or off when you clap your hands) to make it easier to turn lights on if you have to get up during the night. · Install sturdy handrails on stairways. · Move items in your cabinets so that the things you use a lot are on the lower shelves (about waist level). · Keep a cordless phone and a flashlight with new batteries by your bed. If possible, put a phone in each of the main rooms of your house, or carry a cell phone in case you fall and cannot reach a phone. Or, you can wear a device around your neck or wrist. You push a button that sends a signal for help. · Wear low-heeled shoes that fit well and give your feet good support. Use footwear with nonskid soles. Check the heels and soles of your shoes for wear. Repair or replace worn heels or soles. · Do not wear socks without shoes on wood floors. · Walk on the grass when the sidewalks are slippery. If you live in an area that gets snow and ice in the winter, sprinkle salt on slippery steps and sidewalks. Preventing falls in the bath  · Install grab bars and nonskid mats inside and outside your shower or tub and near the toilet and sinks.   · Use shower chairs and bath benches. · Use a hand-held shower head that will allow you to sit while showering. · Get into a tub or shower by putting the weaker leg in first. Get out of a tub or shower with your strong side first.  · Repair loose toilet seats and consider installing a raised toilet seat to make getting on and off the toilet easier. · Keep your bathroom door unlocked while you are in the shower. Where can you learn more? Go to http://dewayne-mya.info/. Enter 0476 79 69 [id number] in the search box to learn more about \"Preventing Falls: Care Instructions. \"  Current as of: March 16, 2018  Content Version: 11.8  © 5161-3418 [employer]. Care instructions adapted under license by [employer] (which disclaims liability or warranty for this information). If you have questions about a medical condition or this instruction, always ask your healthcare professional. [place] [address] any warranty or liability for your use of this information. Patient Education        Preventing Falls: Care Instructions  Your Care Instructions    Getting around your home safely can be a challenge if you have injuries or health problems that make it easy for you to fall. Loose rugs and furniture in walkways are among the dangers for many older people who have problems walking or who have poor eyesight. People who have conditions such as arthritis, osteoporosis, or dementia also have to be careful not to fall. You can make your home safer with a few simple measures. Follow-up care is a key part of your treatment and safety. Be sure to make and go to all appointments, and call your doctor if you are having problems. It's also a good idea to know your test results and keep a list of the medicines you take. How can you care for yourself at home? Taking care of yourself  · You may get dizzy if you do not drink enough water.  To prevent dehydration, drink plenty of fluids, enough so that your urine is light yellow or clear like water. Choose water and other caffeine-free clear liquids. If you have kidney, heart, or liver disease and have to limit fluids, talk with your doctor before you increase the amount of fluids you drink. · Exercise regularly to improve your strength, muscle tone, and balance. Walk if you can. Swimming may be a good choice if you cannot walk easily. · Have your vision and hearing checked each year or any time you notice a change. If you have trouble seeing and hearing, you might not be able to avoid objects and could lose your balance. · Know the side effects of the medicines you take. Ask your doctor or pharmacist whether the medicines you take can affect your balance. Sleeping pills or sedatives can affect your balance. · Limit the amount of alcohol you drink. Alcohol can impair your balance and other senses. · Ask your doctor whether calluses or corns on your feet need to be removed. If you wear loose-fitting shoes because of calluses or corns, you can lose your balance and fall. · Talk to your doctor if you have numbness in your feet. Preventing falls at home  · Remove raised doorway thresholds, throw rugs, and clutter. Repair loose carpet or raised areas in the floor. · Move furniture and electrical cords to keep them out of walking paths. · Use nonskid floor wax, and wipe up spills right away, especially on ceramic tile floors. · If you use a walker or cane, put rubber tips on it. If you use crutches, clean the bottoms of them regularly with an abrasive pad, such as steel wool. · Keep your house well lit, especially [place], and outside walkways. Use night-lights in areas such as hallways and bathrooms. Add extra light switches or use remote switches (such as switches that go on or off when you clap your hands) to make it easier to turn lights on if you have to get up during the night. · Install sturdy handrails on stairways.   · Move items in your cabinets so that the things you use a lot are on the lower shelves (about waist level). · Keep a cordless phone and a flashlight with new batteries by your bed. If possible, put a phone in each of the main rooms of your house, or carry a cell phone in case you fall and cannot reach a phone. Or, you can wear a device around your neck or wrist. You push a button that sends a signal for help. · Wear low-heeled shoes that fit well and give your feet good support. Use footwear with nonskid soles. Check the heels and soles of your shoes for wear. Repair or replace worn heels or soles. · Do not wear socks without shoes on wood floors. · Walk on the grass when the sidewalks are slippery. If you live in an area that gets snow and ice in the winter, sprinkle salt on slippery steps and sidewalks. Preventing falls in the bath  · Install grab bars and nonskid mats inside and outside your shower or tub and near the toilet and sinks. · Use shower chairs and bath benches. · Use a hand-held shower head that will allow you to sit while showering. · Get into a tub or shower by putting the weaker leg in first. Get out of a tub or shower with your strong side first.  · Repair loose toilet seats and consider installing a raised toilet seat to make getting on and off the toilet easier. · Keep your bathroom door unlocked while you are in the shower. Where can you learn more? Go to http://dewayne-mya.info/. Enter 0476 [id number] in the search box to learn more about \"Preventing Falls: Care Instructions. \"  Current as of: March 16, 2018  Content Version: 11.8  © 1825-4273 [employer]. Care instructions adapted under license by [employer] (which disclaims liability or warranty for this information).  If you have questions about a medical condition or this instruction, always ask your healthcare professional. [address] any warranty or liability for your use of this information.

## 2019-04-07 NOTE — ED NOTES
Patient arrived by EMS form home with c/o fall from bed. Patient has abrasion to right side of forehead. Denies LOC. Patient normally uses motorized wheelchair at home. BG by .  Patient alert and oriented upon arrival

## 2019-08-14 ENCOUNTER — OFFICE VISIT (OUTPATIENT)
Dept: VASCULAR SURGERY | Age: 84
End: 2019-08-14

## 2019-08-14 VITALS
RESPIRATION RATE: 17 BRPM | WEIGHT: 151 LBS | BODY MASS INDEX: 21.62 KG/M2 | SYSTOLIC BLOOD PRESSURE: 118 MMHG | DIASTOLIC BLOOD PRESSURE: 74 MMHG | HEIGHT: 70 IN

## 2019-08-14 DIAGNOSIS — G20 PARKINSON'S DISEASE (HCC): ICD-10-CM

## 2019-08-14 DIAGNOSIS — I73.9 PAD (PERIPHERAL ARTERY DISEASE) (HCC): Primary | ICD-10-CM

## 2019-08-14 DIAGNOSIS — Z99.3 WHEELCHAIR BOUND: ICD-10-CM

## 2019-08-14 DIAGNOSIS — E11.51 TYPE 2 DIABETES MELLITUS WITH DIABETIC PERIPHERAL ANGIOPATHY WITHOUT GANGRENE, UNSPECIFIED WHETHER LONG TERM INSULIN USE (HCC): ICD-10-CM

## 2019-08-14 NOTE — PROGRESS NOTES
1. Have you been to an emergency room or urgent care clinic since your last visit? NO    Hospitalized since your last visit? If yes, where, when, and reason for visit? No  2. Have you seen or consulted any other health care providers outside of the [facility] since your last visit including any procedures, health maintenance items. If yes, where, when and reason for visit?  NO

## 2019-08-14 NOTE — LETTER
8/14/19 Patient: Ana Thrasher YOB: 1933 Date of Visit: 8/14/2019 Fernando Del Rio MD 
[address] [place] [address] [address] [place] [address] VIA Facsimile: [phone] Dear Fernando Del Rio MD, Thank you for referring Mr. Ana Thrasher to [facility] for evaluation. My notes for this consultation are attached. If you have questions, please do not hesitate to call me. I look forward to following your patient along with you. Sincerely, [address] [place] [address]

## 2019-08-14 NOTE — PROGRESS NOTES
Alice Oliver    Chief Complaint   Patient presents with    Leg Pain    Swelling       History and Physical    Alice Oliver is a 80 y.o. gentleman with history of critical limb ischemia of the left lower extremity with history of ulceration of the left lower extremity. Patient does also have Parkinson's disease and presents to the office today with his daughter in follow-up. He is s/p left leg angiogram with atherectomy and balloon angioplasty of the superficial femoral artery and popliteal artery in November of last year. He is doing well overall. He does not ambulate much and is mostly wheelchair-bound so claudication is difficult to assess. He is not complaining of any rest pain at this time. No temperature changes. He has no open wounds at this time. No skin changes reported. No fevers or chills. Arterial studies done in our office today show moderate to severe arterial disease noted within the right lower extremity at rest. Severe arterial disease noted within the left lower extremity at rest. ABIs are unreliable secondary to medial calcinosis. He does also have some mild swelling in the lower extremities and his daughter shows that she has bought new compression stockings for him today in the office.     Past Medical History:   Diagnosis Date    Benign prostatic hyperplasia with urinary retention     BPH (benign prostatic hyperplasia)     CAD (coronary artery disease)     Severe 3 vessel ( Cath 05/2014) Medical management    Colon cancer ([address]     CVA (cerebral infarction)     x2    Diabetes ([address]     High cholesterol     History of colonoscopy     HLD (hyperlipidemia)     Hypertension     Osteoarthritis     Parkinson's disease ([address]     Shortness of breath     Stroke ([address]     Tuberculosis of lung     Urinary retention      Past Surgical History:   Procedure Laterality Date    HX HEART CATHETERIZATION      severe 3 vessel- medical management 2014    HX ORTHOPAEDIC      R leg surgery    [facility]  [facility]25/2017    [facility]      Patient Active Problem List   Diagnosis Code    Acute bronchitis J20.9    Chest pain, unspecified R07.9    HTN (hypertension) I10    Parkinson disease ([facility] G20    Pure hypercholesterolemia E78.00    DM (diabetes mellitus) ([facility] E11.9    Acute coronary syndrome ([facility] I24.9    Acute renal insufficiency N28.9    Coronary atherosclerosis of native coronary artery I25.10    BPH (benign prostatic hyperplasia) N40.0    Urinary retention R33.9    Partial small bowel obstruction (HCC) K56.600    Benign prostate hyperplasia N40.0    NSTEMI (non-ST elevated myocardial infarction) ([facility]) I21.4    Benign prostatic hyperplasia with urinary retention N40.1, R33.8    CAD (coronary artery disease) I25.10    Colon cancer ([facility]) C18.9    Diabetes ([facility]) E11.9    High cholesterol E78.00    History of colonoscopy Z98.890    HLD (hyperlipidemia) E78.5    Osteoarthritis M19.90    Parkinson's disease ([facility] G20    Shortness of breath R06.02    Stroke ([facility]) I63.9    Hypertension I10    Type 2 diabetes mellitus with diabetic neuropathy ([facility]) E11.40     Current Outpatient Medications   Medication Sig Dispense Refill    nitroglycerin (NITROSTAT) 0.4 mg SL tablet 1 Tab by SubLINGual route as needed for Chest Pain. 1 Bottle 3    finasteride (PROSCAR) 5 mg tablet Take 5 mg by mouth daily.  potassium chloride SR (KLOR-CON 10) 10 mEq tablet Take  by mouth.  glycopyrrolate (ROBINUL) 1 mg tablet Take 1 mg by mouth as needed. parkinson      isosorbide mononitrate ER (IMDUR) 60 mg CR tablet Take 1 Tab by mouth daily. (Patient taking differently: Take 60 mg by mouth nightly.) 30 Tab 6    ramipril (ALTACE) 10 mg capsule Take 1 Cap by mouth daily. (Patient taking differently: Take 5 mg by mouth daily.) 30 Cap 0    sitaGLIPtin (JANUVIA) 50 mg tablet Take 1 Tab by mouth daily. 30 Tab 0    docusate sodium (COLACE) 100 mg capsule Take 100 mg by mouth daily.       gabapentin (NEURONTIN) 600 mg tablet Take 600 mg by mouth nightly.  simvastatin (ZOCOR) 20 mg tablet Take 20 mg by mouth nightly.  tamsulosin (FLOMAX) 0.4 mg capsule Take 1 capsule by mouth daily (after dinner). 90 capsule 3    metoprolol (LOPRESSOR) 25 mg tablet Take 1 Tab by mouth two (2) times a day. 60 Tab 11    aspirin 81 mg chewable tablet Take 1 Tab by mouth daily. (Patient taking differently: Take 81 mg by mouth daily. havent taken ASA in 6 months) 90 Tab 3    carbidopa-levodopa (SINEMET-25/250)  mg per tablet Take 1 Tab by mouth three (3) times daily. No Known Allergies    Physical Exam:    Visit Vitals  /74 (BP 1 Location: Left arm, BP Patient Position: Sitting)   Resp 17   Ht 5' 10\" (1.778 m)   Wt 151 lb (68.5 kg)   BMI 21.67 kg/m²      General: elderly male in no acute distress. Patient is in a motorized wheelchair  HEENT: EOMI, no scleral icterus is noted. Pulmonary: No increased work or breathing is noted. Abdomen: nondistended. Extremities: Warm and well perfused bilaterally. Pt has mild bilateral lower extremity edema. He has no skin changes or ulcers bilaterally. Neuro: Cranial nerves II through XII are grossly intact   Integument: No ulcerations are identified visibly      Impression and Plan:  Hilary Burkitt is a 80 y.o. male with moderate-severe peripheral arterial disease of the bilateral lower extremities. He currently has no skin changes or open ulcers. He does seem to be doing well overall. He does not ambulate and therefore does not complain of claudication. He does not have any rest pain currently. I discussed with patient and his daughter that we will continue to monitor him closely with routine surveillance and we will obtain follow-up arterial studies in 6 months. He will f/u afterwards. They were educated on the signs and symptoms of worsening arterial insufficiency and will call the office sooner as needed. Plan was discussed.  Patient expresses understanding and agrees. Jensen Joyner  [phone]        PLEASE NOTE:  This document has been produced using voice recognition software. Unrecognized errors in transcription may be present.

## 2019-08-27 ENCOUNTER — OFFICE VISIT (OUTPATIENT)
Dept: CARDIOLOGY CLINIC | Age: 84
End: 2019-08-27

## 2019-08-27 VITALS
OXYGEN SATURATION: 96 % | BODY MASS INDEX: 21.67 KG/M2 | DIASTOLIC BLOOD PRESSURE: 70 MMHG | HEIGHT: 70 IN | SYSTOLIC BLOOD PRESSURE: 130 MMHG | HEART RATE: 79 BPM

## 2019-08-27 DIAGNOSIS — I25.83 CORONARY ARTERY DISEASE DUE TO LIPID RICH PLAQUE: Primary | ICD-10-CM

## 2019-08-27 DIAGNOSIS — I10 ESSENTIAL HYPERTENSION WITH GOAL BLOOD PRESSURE LESS THAN 140/90: ICD-10-CM

## 2019-08-27 DIAGNOSIS — E78.00 PURE HYPERCHOLESTEROLEMIA: ICD-10-CM

## 2019-08-27 DIAGNOSIS — I25.10 CORONARY ARTERY DISEASE DUE TO LIPID RICH PLAQUE: Primary | ICD-10-CM

## 2019-08-27 NOTE — PROGRESS NOTES
[facility]    As you know, Mr. Walker Cerda is an 80 y.o. male with advanced Parkinson's disease with functional limitation, diabetes, CAD, hypertension, hyperlipidemia, stroke, as well as benign prostatic hypertrophy and severe three vessel coronary artery disease. Mr. Walker Cerda is here today for a follow up appointment. He is accompanied with the family member. He has used 2 nitroglycerin since last visit. He denies any palpitation, presyncope or syncope. Denies any nausea, vomiting, fever, chills, sputum production. No hematuria or other bleeding complaints    Past Medical History:   Diagnosis Date    Benign prostatic hyperplasia with urinary retention     BPH (benign prostatic hyperplasia)     CAD (coronary artery disease)     Severe 3 vessel ( Cath 05/2014) Medical management    Colon cancer ([address]     CVA (cerebral infarction)     x2    Diabetes ([address]     High cholesterol     History of colonoscopy     HLD (hyperlipidemia)     Hypertension     Osteoarthritis     Parkinson's disease ([address]     Shortness of breath     Stroke ([address]     Tuberculosis of lung     Urinary retention        Past Surgical History:   Procedure Laterality Date    HX HEART CATHETERIZATION      severe 3 vessel- medical management 2014    HX ORTHOPAEDIC      R leg surgery    HX UROLOGICAL  10/25/2017    [facility]        Current Outpatient Medications   Medication Sig    nitroglycerin (NITROSTAT) 0.4 mg SL tablet 1 Tab by SubLINGual route as needed for Chest Pain.  finasteride (PROSCAR) 5 mg tablet Take 5 mg by mouth daily.  potassium chloride SR (KLOR-CON 10) 10 mEq tablet Take  by mouth.  glycopyrrolate (ROBINUL) 1 mg tablet Take 1 mg by mouth as needed. parkinson    isosorbide mononitrate ER (IMDUR) 60 mg CR tablet Take 1 Tab by mouth daily.  (Patient taking differently: Take 60 mg by mouth nightly.)    ramipril (ALTACE) 10 mg capsule Take 1 Cap by mouth daily. (Patient taking differently: Take 5 mg by mouth daily.)    sitaGLIPtin (JANUVIA) 50 mg tablet Take 1 Tab by mouth daily.  docusate sodium (COLACE) 100 mg capsule Take 100 mg by mouth daily.  gabapentin (NEURONTIN) 600 mg tablet Take 600 mg by mouth nightly.  simvastatin (ZOCOR) 20 mg tablet Take 20 mg by mouth nightly.  tamsulosin (FLOMAX) 0.4 mg capsule Take 1 capsule by mouth daily (after dinner).  metoprolol (LOPRESSOR) 25 mg tablet Take 1 Tab by mouth two (2) times a day.  aspirin 81 mg chewable tablet Take 1 Tab by mouth daily. (Patient taking differently: Take 81 mg by mouth daily. havent taken ASA in 6 months)    carbidopa-levodopa (SINEMET-25/250)  mg per tablet Take 1 Tab by mouth three (3) times daily. No current facility-administered medications for this visit. Allergies and Sensitivities:  No Known Allergies    Family History:  Family History   Problem Relation Age of Onset    Heart Disease Mother     Hypertension Mother     Hypertension Father     Heart Disease Father     Cancer Sister     Heart Disease Sister     Hypertension Sister        Social History:  Social History     Tobacco Use    Smoking status: Never Smoker    Smokeless tobacco: Never Used   Substance Use Topics    Alcohol use: No    Drug use: No     He  reports that he has never smoked. He has never used smokeless tobacco.  He  reports that he does not drink alcohol. Review of Systems:  Cardiac symptoms as noted above in HPI. All others negative. Physical Exam:  BP Readings from Last 3 Encounters:   08/27/19 130/70   08/14/19 118/74   04/07/19 161/77         Pulse Readings from Last 3 Encounters:   08/27/19 79   04/07/19 (!) 57   02/13/19 70          Wt Readings from Last 3 Encounters:   08/14/19 151 lb (68.5 kg)   04/07/19 151 lb (68.5 kg)   02/13/19 198 lb (89.8 kg)     Constitutional: Oriented to person, place, and time.    HENT: Head: Normocephalic and atraumatic. Neck: No JVD present. Cardiovascular: Regular rhythm. No murmur, gallop or rubs appreciated  Lung: Breath sounds normal. No respiratory distress. No ronchi or rales appreciated  Abdominal: No tenderness. Musculoskeletal: There is no ankle edema. No cynosis    Review of Data  LABS:   Lab Results   Component Value Date/Time    Sodium 140 04/07/2019 11:25 AM    Potassium 4.0 04/07/2019 11:25 AM    Chloride 103 04/07/2019 11:25 AM    CO2 33 (H) 04/07/2019 11:25 AM    Glucose 104 (H) 04/07/2019 11:25 AM    BUN 14 04/07/2019 11:25 AM    Creatinine 1.25 04/07/2019 11:25 AM       Lab Results   Component Value Date/Time    ALT (SGPT) 15 (L) 04/07/2019 11:25 AM     Lab Results   Component Value Date/Time    Hemoglobin A1c 7.8 (H) 05/07/2014 07:55 AM     EKG  (05/2014) Sinus rhythm at 85 bpm. No ST-T changes of ischemia. No pathologic q waves. ECHO (05/2014)  Left ventricle: Size was normal. Systolic function was mildly reduced. The parasternl short axis view suggested inferoseptal dyskinesis. This could  not be confirmed in other views. Ejection fraction was estimated in therange of 45 % to 50 %. Wall thickness was normal. Doppler parameters wereconsistent with abnormal left ventricular relaxation (grade 1 diastolic dysfunction). Right ventricle: The size was normal. Estimated peak pressure was in the range of 30 mmHg to 35 mmHg. Left atrium: Size was normal.  Right atrium: Size was normal.  Mitral valve: There was mild regurgitation. Aortic valve: The valve was trileaflet. Leaflets exhibited mild calcification, normal cuspal separation, and sclerosis. There was nostenosis. There was no regurgitation. Tricuspid valve: There was trivial regurgitation. CATHETERIZATION (05/2014)   1. LM: Left main appears to be heavily calcified and diffusely diseased. The caliber of the left main angiographically was even smaller than the large obtuse marginal branch which is likely because of diffuse disease.    2. LAD: Proximal and mid heavy calcification. There was evidence of prox-mid heavily calcified filling defect with likely   eccentric 70% stenosis, most obvious in JASKARAN caudal view. Distal LAD has a couple of 40-50% lesions. There were 2 diagonal   branches. First diagonal branch has a proximal to 80-90% ostial disease. This is a small to medium caliber vessel. Second diagonal branch   has no significant disease. 3. RAMUS: ostial 99% disease followed by mid 100% occlusion. 4. LCX/OM: heavy ostial calcification noted with likely ostial borderline 50-60% disease. There was 90-degree angulation. Proximal circumflex has 60-70% disease. Large obtuse marginal branch has 90% disease. Second obtuse marginal branch has 90% disease which is medium   caliber vessel. 5. RCA: Likely 100% ostial occlusion with evidence of heavy left-to-right collaterals supplying the right PDA. IMPRESSION & PLAN:  Mr. Rosario Lou is an [de-identified]year old male with a history of coronary artery disease, diabetes, hypertension, hyperlipidemia, advanced Parkinson's disease. Coronary artery disease:    As mentioned above, Mr. Rosario Lou has three vessel coronary artery disease, probably including left main. He has remained on medical therapy since 2014 and he has done very well. He used 2 nitroglycerin since last visit about a year ago  He denies any worsening of his symptoms. He is on dual antianginal medication. Continue with aspirin. Ischemic cardiomyopathy:  Last Ejection fraction noted was 45-50% in 2014. No evidence of fluid overload on exam at this time. Continue same meds. Currently he is on metoprolol and ramipril. Continue same. Hypertension:  Blood pressure is 130/70 mmHg. Continue current antihypertensive medication. Hyperlipidemia:  He is on simvastatin. This is being managed by PCP. Defer to them    Diabetes:   Last Hemoglobin A1c on file is 7.8. This is being managed by primary care provider.   Goal hemoglobin A1c should be less than 7 from a cardiovascular standpoint. Defer to PCP    Parkinson's disease: This is being managed by primary care provider. This plan was discussed with patient who is in agreement. Thank you for allowing me to participate in patient care. Please feel free to call me if you have any question or concern.

## 2019-12-07 ENCOUNTER — APPOINTMENT (OUTPATIENT)
Dept: GENERAL RADIOLOGY | Age: 84
DRG: 690 | End: 2019-12-07
Attending: PHYSICIAN ASSISTANT
Payer: MEDICARE

## 2019-12-07 ENCOUNTER — HOSPITAL ENCOUNTER (INPATIENT)
Age: 84
LOS: 4 days | Discharge: NURSING, REHAB OR LONG-TERM CARE | DRG: 690 | End: 2019-12-12
Attending: EMERGENCY MEDICINE | Admitting: HOSPITALIST
Payer: MEDICARE

## 2019-12-07 ENCOUNTER — APPOINTMENT (OUTPATIENT)
Dept: CT IMAGING | Age: 84
DRG: 690 | End: 2019-12-07
Attending: PHYSICIAN ASSISTANT
Payer: MEDICARE

## 2019-12-07 DIAGNOSIS — M25.571 ACUTE RIGHT ANKLE PAIN: ICD-10-CM

## 2019-12-07 DIAGNOSIS — S80.212A ABRASION OF LEFT KNEE, INITIAL ENCOUNTER: ICD-10-CM

## 2019-12-07 DIAGNOSIS — N30.00 ACUTE CYSTITIS WITHOUT HEMATURIA: Primary | ICD-10-CM
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
## 2019-12-07 DIAGNOSIS — N17.9 AKI (ACUTE KIDNEY INJURY) (HCC): ICD-10-CM

## 2019-12-07 PROBLEM — N39.0 UTI (URINARY TRACT INFECTION): Status: ACTIVE | Noted: 2019-12-07

## 2019-12-07 PROBLEM — E87.6 HYPOKALEMIA: Status: ACTIVE | Noted: 2019-12-07

## 2019-12-07 LAB
ALBUMIN SERPL-MCNC: 3.2 G/DL (ref 3.4–5)
ALBUMIN/GLOB SERPL: 0.9 {RATIO} (ref 0.8–1.7)
ALP SERPL-CCNC: 101 U/L (ref 45–117)
ALT SERPL-CCNC: 17 U/L (ref 16–61)
ANION GAP BLD CALC-SCNC: 18 MMOL/L (ref 10–20)
ANION GAP SERPL CALC-SCNC: 5 MMOL/L (ref 3–18)
ANION GAP SERPL CALC-SCNC: 6 MMOL/L (ref 3–18)
APPEARANCE UR: ABNORMAL
AST SERPL-CCNC: 13 U/L (ref 10–38)
ATRIAL RATE: 78 BPM
BACTERIA URNS QL MICRO: ABNORMAL /HPF
BASOPHILS # BLD: 0 K/UL (ref 0–0.1)
BASOPHILS NFR BLD: 0 % (ref 0–2)
BILIRUB SERPL-MCNC: 0.7 MG/DL (ref 0.2–1)
BILIRUB UR QL: ABNORMAL
BUN BLD-MCNC: 17 MG/DL (ref 7–18)
BUN SERPL-MCNC: 15 MG/DL (ref 7–18)
BUN SERPL-MCNC: 16 MG/DL (ref 7–18)
BUN/CREAT SERPL: 11 (ref 12–20)
BUN/CREAT SERPL: 11 (ref 12–20)
CA-I BLD-MCNC: 1.1 MMOL/L (ref 1.12–1.32)
CALCIUM SERPL-MCNC: 8.6 MG/DL (ref 8.5–10.1)
CALCIUM SERPL-MCNC: 8.8 MG/DL (ref 8.5–10.1)
CALCULATED P AXIS, ECG09: 32 DEGREES
CALCULATED R AXIS, ECG10: -34 DEGREES
CALCULATED T AXIS, ECG11: -21 DEGREES
CHLORIDE BLD-SCNC: 97 MMOL/L (ref 100–108)
CHLORIDE SERPL-SCNC: 103 MMOL/L (ref 100–111)
CHLORIDE SERPL-SCNC: 103 MMOL/L (ref 100–111)
CO2 BLD-SCNC: 31 MMOL/L (ref 19–24)
CO2 SERPL-SCNC: 30 MMOL/L (ref 21–32)
CO2 SERPL-SCNC: 32 MMOL/L (ref 21–32)
COLOR UR: YELLOW
CREAT SERPL-MCNC: 1.32 MG/DL (ref 0.6–1.3)
CREAT SERPL-MCNC: 1.44 MG/DL (ref 0.6–1.3)
CREAT UR-MCNC: 1.3 MG/DL (ref 0.6–1.3)
DIAGNOSIS, 93000: NORMAL
DIFFERENTIAL METHOD BLD: ABNORMAL
EOSINOPHIL # BLD: 0.1 K/UL (ref 0–0.4)
EOSINOPHIL NFR BLD: 1 % (ref 0–5)
EPITH CASTS URNS QL MICRO: ABNORMAL /LPF (ref 0–5)
ERYTHROCYTE [DISTWIDTH] IN BLOOD BY AUTOMATED COUNT: 15.1 % (ref 11.6–14.5)
EST. AVERAGE GLUCOSE BLD GHB EST-MCNC: 163 MG/DL
GLOBULIN SER CALC-MCNC: 3.5 G/DL (ref 2–4)
GLUCOSE BLD STRIP.AUTO-MCNC: 107 MG/DL (ref 74–106)
GLUCOSE BLD STRIP.AUTO-MCNC: 137 MG/DL (ref 70–110)
GLUCOSE SERPL-MCNC: 132 MG/DL (ref 74–99)
GLUCOSE SERPL-MCNC: 139 MG/DL (ref 74–99)
GLUCOSE UR STRIP.AUTO-MCNC: NEGATIVE MG/DL
HBA1C MFR BLD: 7.3 % (ref 4.2–5.6)
HCT VFR BLD AUTO: 39.2 % (ref 36–48)
HCT VFR BLD CALC: 35 % (ref 36–49)
HGB BLD-MCNC: 11.9 G/DL (ref 12–16)
HGB BLD-MCNC: 12.7 G/DL (ref 13–16)
HGB UR QL STRIP: ABNORMAL
KETONES UR QL STRIP.AUTO: ABNORMAL MG/DL
LEUKOCYTE ESTERASE UR QL STRIP.AUTO: ABNORMAL
LYMPHOCYTES # BLD: 2.6 K/UL (ref 0.9–3.6)
LYMPHOCYTES NFR BLD: 16 % (ref 21–52)
MAGNESIUM SERPL-MCNC: 1.8 MG/DL (ref 1.6–2.6)
MCH RBC QN AUTO: 28.3 PG (ref 24–34)
MCHC RBC AUTO-ENTMCNC: 32.4 G/DL (ref 31–37)
MCV RBC AUTO: 87.5 FL (ref 74–97)
MONOCYTES # BLD: 1.1 K/UL (ref 0.05–1.2)
MONOCYTES NFR BLD: 7 % (ref 3–10)
NEUTS SEG # BLD: 12.3 K/UL (ref 1.8–8)
NEUTS SEG NFR BLD: 76 % (ref 40–73)
NITRITE UR QL STRIP.AUTO: NEGATIVE
P-R INTERVAL, ECG05: 156 MS
PH UR STRIP: 5.5 [PH] (ref 5–8)
PLATELET # BLD AUTO: 241 K/UL (ref 135–420)
PMV BLD AUTO: 9.5 FL (ref 9.2–11.8)
POTASSIUM BLD-SCNC: 2.9 MMOL/L (ref 3.5–5.5)
POTASSIUM SERPL-SCNC: 2.8 MMOL/L (ref 3.5–5.5)
POTASSIUM SERPL-SCNC: 2.9 MMOL/L (ref 3.5–5.5)
PROT SERPL-MCNC: 6.7 G/DL (ref 6.4–8.2)
PROT UR STRIP-MCNC: 300 MG/DL
Q-T INTERVAL, ECG07: 404 MS
QRS DURATION, ECG06: 94 MS
QTC CALCULATION (BEZET), ECG08: 460 MS
RBC # BLD AUTO: 4.48 M/UL (ref 4.7–5.5)
RBC #/AREA URNS HPF: ABNORMAL /HPF (ref 0–5)
SODIUM BLD-SCNC: 142 MMOL/L (ref 136–145)
SODIUM SERPL-SCNC: 139 MMOL/L (ref 136–145)
SODIUM SERPL-SCNC: 140 MMOL/L (ref 136–145)
SP GR UR REFRACTOMETRY: 1.03 (ref 1–1.03)
UROBILINOGEN UR QL STRIP.AUTO: 1 EU/DL (ref 0.2–1)
VENTRICULAR RATE, ECG03: 78 BPM
WBC # BLD AUTO: 16.1 K/UL (ref 4.6–13.2)
WBC URNS QL MICRO: >100 /HPF (ref 0–4)

## 2019-12-07 PROCEDURE — 87186 SC STD MICRODIL/AGAR DIL: CPT

## 2019-12-07 PROCEDURE — 99218 HC RM OBSERVATION: CPT

## 2019-12-07 PROCEDURE — 87086 URINE CULTURE/COLONY COUNT: CPT

## 2019-12-07 PROCEDURE — 73564 X-RAY EXAM KNEE 4 OR MORE: CPT

## 2019-12-07 PROCEDURE — 83036 HEMOGLOBIN GLYCOSYLATED A1C: CPT

## 2019-12-07 PROCEDURE — 87077 CULTURE AEROBIC IDENTIFY: CPT

## 2019-12-07 PROCEDURE — 94762 N-INVAS EAR/PLS OXIMTRY CONT: CPT

## 2019-12-07 PROCEDURE — 93005 ELECTROCARDIOGRAM TRACING: CPT

## 2019-12-07 PROCEDURE — 96367 TX/PROPH/DG ADDL SEQ IV INF: CPT

## 2019-12-07 PROCEDURE — 96365 THER/PROPH/DIAG IV INF INIT: CPT

## 2019-12-07 PROCEDURE — 85025 COMPLETE CBC W/AUTO DIFF WBC: CPT

## 2019-12-07 PROCEDURE — 74011000258 HC RX REV CODE- 258: Performed by: EMERGENCY MEDICINE

## 2019-12-07 PROCEDURE — 74011250636 HC RX REV CODE- 250/636: Performed by: HOSPITALIST

## 2019-12-07 PROCEDURE — 74011250636 HC RX REV CODE- 250/636: Performed by: EMERGENCY MEDICINE

## 2019-12-07 PROCEDURE — 96372 THER/PROPH/DIAG INJ SC/IM: CPT

## 2019-12-07 PROCEDURE — 74011250637 HC RX REV CODE- 250/637: Performed by: EMERGENCY MEDICINE

## 2019-12-07 PROCEDURE — 77030037877 HC DRSG MEPILEX >48IN BORD MOLN -A

## 2019-12-07 PROCEDURE — 81001 URINALYSIS AUTO W/SCOPE: CPT

## 2019-12-07 PROCEDURE — 80047 BASIC METABLC PNL IONIZED CA: CPT

## 2019-12-07 PROCEDURE — 99285 EMERGENCY DEPT VISIT HI MDM: CPT

## 2019-12-07 PROCEDURE — 72192 CT PELVIS W/O DYE: CPT

## 2019-12-07 PROCEDURE — 73562 X-RAY EXAM OF KNEE 3: CPT

## 2019-12-07 PROCEDURE — 96366 THER/PROPH/DIAG IV INF ADDON: CPT

## 2019-12-07 PROCEDURE — 80053 COMPREHEN METABOLIC PANEL: CPT

## 2019-12-07 PROCEDURE — 77030019905 HC CATH URETH INTMIT MDII -A

## 2019-12-07 PROCEDURE — 82962 GLUCOSE BLOOD TEST: CPT

## 2019-12-07 PROCEDURE — 77030040831 HC BAG URINE DRNG MDII -A

## 2019-12-07 PROCEDURE — 83735 ASSAY OF MAGNESIUM: CPT

## 2019-12-07 PROCEDURE — 73610 X-RAY EXAM OF ANKLE: CPT

## 2019-12-07 PROCEDURE — 74011250637 HC RX REV CODE- 250/637: Performed by: HOSPITALIST

## 2019-12-07 RX ORDER — LISINOPRIL 20 MG/1
40 TABLET ORAL DAILY
Status: DISCONTINUED | OUTPATIENT
Start: 2019-12-08 | End: 2019-12-12 | Stop reason: HOSPADM

## 2019-12-07 RX ORDER — SIMVASTATIN 20 MG/1
20 TABLET, FILM COATED ORAL
Status: DISCONTINUED | OUTPATIENT
Start: 2019-12-07 | End: 2019-12-12 | Stop reason: HOSPADM

## 2019-12-07 RX ORDER — CARBIDOPA AND LEVODOPA 25; 250 MG/1; MG/1
1 TABLET ORAL 3 TIMES DAILY
Status: DISCONTINUED | OUTPATIENT
Start: 2019-12-07 | End: 2019-12-09 | Stop reason: CLARIF

## 2019-12-07 RX ORDER — ISOSORBIDE MONONITRATE 60 MG/1
60 TABLET, EXTENDED RELEASE ORAL
Status: DISCONTINUED | OUTPATIENT
Start: 2019-12-07 | End: 2019-12-12 | Stop reason: HOSPADM

## 2019-12-07 RX ORDER — GABAPENTIN 300 MG/1
600 CAPSULE ORAL
Status: DISCONTINUED | OUTPATIENT
Start: 2019-12-07 | End: 2019-12-12 | Stop reason: HOSPADM

## 2019-12-07 RX ORDER — POTASSIUM CHLORIDE 7.45 MG/ML
10 INJECTION INTRAVENOUS
Status: COMPLETED | OUTPATIENT
Start: 2019-12-07 | End: 2019-12-07

## 2019-12-07 RX ORDER — ONDANSETRON 2 MG/ML
4 INJECTION INTRAMUSCULAR; INTRAVENOUS
Status: DISCONTINUED | OUTPATIENT
Start: 2019-12-07 | End: 2019-12-12 | Stop reason: HOSPADM

## 2019-12-07 RX ORDER — MAGNESIUM SULFATE 1 G/100ML
1 INJECTION INTRAVENOUS ONCE
Status: COMPLETED | OUTPATIENT
Start: 2019-12-07 | End: 2019-12-08

## 2019-12-07 RX ORDER — DOCUSATE SODIUM 100 MG/1
100 CAPSULE, LIQUID FILLED ORAL DAILY
Status: DISCONTINUED | OUTPATIENT
Start: 2019-12-08 | End: 2019-12-12 | Stop reason: HOSPADM

## 2019-12-07 RX ORDER — ENOXAPARIN SODIUM 100 MG/ML
40 INJECTION SUBCUTANEOUS EVERY 24 HOURS
Status: DISCONTINUED | OUTPATIENT
Start: 2019-12-07 | End: 2019-12-12 | Stop reason: HOSPADM

## 2019-12-07 RX ORDER — GUAIFENESIN 100 MG/5ML
81 LIQUID (ML) ORAL DAILY
Status: DISCONTINUED | OUTPATIENT
Start: 2019-12-08 | End: 2019-12-12 | Stop reason: HOSPADM

## 2019-12-07 RX ORDER — TAMSULOSIN HYDROCHLORIDE 0.4 MG/1
0.4 CAPSULE ORAL
Status: DISCONTINUED | OUTPATIENT
Start: 2019-12-08 | End: 2019-12-12 | Stop reason: HOSPADM

## 2019-12-07 RX ORDER — POTASSIUM CHLORIDE 20 MEQ/1
40 TABLET, EXTENDED RELEASE ORAL EVERY 4 HOURS
Status: COMPLETED | OUTPATIENT
Start: 2019-12-07 | End: 2019-12-08

## 2019-12-07 RX ORDER — ACETAMINOPHEN 325 MG/1
650 TABLET ORAL
Status: DISCONTINUED | OUTPATIENT
Start: 2019-12-07 | End: 2019-12-12 | Stop reason: HOSPADM

## 2019-12-07 RX ORDER — FINASTERIDE 5 MG/1
5 TABLET, FILM COATED ORAL DAILY
Status: DISCONTINUED | OUTPATIENT
Start: 2019-12-08 | End: 2019-12-12 | Stop reason: HOSPADM

## 2019-12-07 RX ORDER — METOPROLOL TARTRATE 25 MG/1
25 TABLET, FILM COATED ORAL 2 TIMES DAILY
Status: DISCONTINUED | OUTPATIENT
Start: 2019-12-07 | End: 2019-12-12 | Stop reason: HOSPADM

## 2019-12-07 RX ADMIN — POTASSIUM CHLORIDE 10 MEQ: 7.46 INJECTION, SOLUTION INTRAVENOUS at 13:26

## 2019-12-07 RX ADMIN — GABAPENTIN 600 MG: 300 CAPSULE ORAL at 22:41

## 2019-12-07 RX ADMIN — POTASSIUM CHLORIDE 40 MEQ: 1500 TABLET, EXTENDED RELEASE ORAL at 22:41

## 2019-12-07 RX ADMIN — ISOSORBIDE MONONITRATE 60 MG: 60 TABLET, EXTENDED RELEASE ORAL at 22:41

## 2019-12-07 RX ADMIN — ENOXAPARIN SODIUM 40 MG: 40 INJECTION, SOLUTION INTRAVENOUS; SUBCUTANEOUS at 22:43

## 2019-12-07 RX ADMIN — POTASSIUM CHLORIDE 10 MEQ: 7.46 INJECTION, SOLUTION INTRAVENOUS at 17:28

## 2019-12-07 RX ADMIN — CEFTRIAXONE 1 G: 1 INJECTION, POWDER, FOR SOLUTION INTRAMUSCULAR; INTRAVENOUS at 17:00

## 2019-12-07 RX ADMIN — POTASSIUM BICARBONATE 40 MEQ: 782 TABLET, EFFERVESCENT ORAL at 12:21

## 2019-12-07 NOTE — ED NOTES
Pt reports fall this am when going to the bathroom. Missed walker and fell backwards. Pt reports pain to left hip, left knee and right ankle. Denies hitting head and LOC. I performed a brief evaluation, including history and physical, of the patient here in triage and I have determined that pt will need further treatment and evaluation from the main side ER physician. I have placed initial orders to help in expediting patients care.      December 07, 2019 at 10:19 AM - EDITA Fierro        Visit Vitals  /61 (BP 1 Location: Right arm, BP Patient Position: At rest)   Pulse 93   Temp 97.4 °F (36.3 °C)   Resp 16   Ht 6' 1\" (1.854 m)   Wt 89.8 kg (198 lb)   SpO2 100%   BMI 26.12 kg/m²

## 2019-12-07 NOTE — ED NOTES
Patient with c/o of left hip and knee pain. Patient with abrasion to left knee. Patient with c/o of right ankle pain. Some swelling noted to right ankle. Patient with c/o weakness that started this morning. Patient uses a walker, cane and motorized chair to get around.  Patient c/o pain with weight bearing in right ankle/heel

## 2019-12-07 NOTE — ED NOTES
Patient straight cath'd for urine sample. Patient returned 400 mL of dark dimas colored urine.  Sample sent to lab

## 2019-12-07 NOTE — ED TRIAGE NOTES
Kodi Risen this morning when walking with walker to bathroom. Bent over and fell. Right ankle pain and left shin abrasion. Able to bear weight.  Has parkinsons with use of hover chair

## 2019-12-08 LAB
ANION GAP SERPL CALC-SCNC: 6 MMOL/L (ref 3–18)
BUN SERPL-MCNC: 15 MG/DL (ref 7–18)
BUN/CREAT SERPL: 12 (ref 12–20)
CALCIUM SERPL-MCNC: 8.4 MG/DL (ref 8.5–10.1)
CHLORIDE SERPL-SCNC: 103 MMOL/L (ref 100–111)
CO2 SERPL-SCNC: 32 MMOL/L (ref 21–32)
CREAT SERPL-MCNC: 1.24 MG/DL (ref 0.6–1.3)
ERYTHROCYTE [DISTWIDTH] IN BLOOD BY AUTOMATED COUNT: 15.4 % (ref 11.6–14.5)
GLUCOSE BLD STRIP.AUTO-MCNC: 117 MG/DL (ref 70–110)
GLUCOSE BLD STRIP.AUTO-MCNC: 119 MG/DL (ref 70–110)
GLUCOSE BLD STRIP.AUTO-MCNC: 123 MG/DL (ref 70–110)
GLUCOSE BLD STRIP.AUTO-MCNC: 140 MG/DL (ref 70–110)
GLUCOSE SERPL-MCNC: 105 MG/DL (ref 74–99)
HCT VFR BLD AUTO: 35.9 % (ref 36–48)
HGB BLD-MCNC: 11.4 G/DL (ref 13–16)
MCH RBC QN AUTO: 28.3 PG (ref 24–34)
MCHC RBC AUTO-ENTMCNC: 31.8 G/DL (ref 31–37)
MCV RBC AUTO: 89.1 FL (ref 74–97)
PLATELET # BLD AUTO: 251 K/UL (ref 135–420)
PMV BLD AUTO: 10.5 FL (ref 9.2–11.8)
POTASSIUM SERPL-SCNC: 2.9 MMOL/L (ref 3.5–5.5)
RBC # BLD AUTO: 4.03 M/UL (ref 4.7–5.5)
SODIUM SERPL-SCNC: 141 MMOL/L (ref 136–145)
WBC # BLD AUTO: 11.3 K/UL (ref 4.6–13.2)

## 2019-12-08 PROCEDURE — 74011000258 HC RX REV CODE- 258: Performed by: HOSPITALIST

## 2019-12-08 PROCEDURE — 74011250636 HC RX REV CODE- 250/636: Performed by: HOSPITALIST

## 2019-12-08 PROCEDURE — 74011250637 HC RX REV CODE- 250/637: Performed by: HOSPITALIST

## 2019-12-08 PROCEDURE — 85027 COMPLETE CBC AUTOMATED: CPT

## 2019-12-08 PROCEDURE — 0107U C DIFF TOX AG DETCJ IA STOOL: CPT

## 2019-12-08 PROCEDURE — 97161 PT EVAL LOW COMPLEX 20 MIN: CPT

## 2019-12-08 PROCEDURE — 80048 BASIC METABOLIC PNL TOTAL CA: CPT

## 2019-12-08 PROCEDURE — 96367 TX/PROPH/DG ADDL SEQ IV INF: CPT

## 2019-12-08 PROCEDURE — 87177 OVA AND PARASITES SMEARS: CPT

## 2019-12-08 PROCEDURE — 89055 LEUKOCYTE ASSESSMENT FECAL: CPT

## 2019-12-08 PROCEDURE — 36415 COLL VENOUS BLD VENIPUNCTURE: CPT

## 2019-12-08 PROCEDURE — 65660000000 HC RM CCU STEPDOWN

## 2019-12-08 PROCEDURE — 87045 FECES CULTURE AEROBIC BACT: CPT

## 2019-12-08 PROCEDURE — 99218 HC RM OBSERVATION: CPT

## 2019-12-08 PROCEDURE — 82962 GLUCOSE BLOOD TEST: CPT

## 2019-12-08 RX ORDER — POTASSIUM CHLORIDE 7.45 MG/ML
10 INJECTION INTRAVENOUS
Status: COMPLETED | OUTPATIENT
Start: 2019-12-08 | End: 2019-12-08

## 2019-12-08 RX ORDER — PENTOXIFYLLINE 400 MG/1
400 TABLET, EXTENDED RELEASE ORAL DAILY
COMMUNITY

## 2019-12-08 RX ORDER — LOPERAMIDE HCL 2 MG
2 TABLET ORAL DAILY
COMMUNITY

## 2019-12-08 RX ORDER — POTASSIUM CHLORIDE 750 MG/1
20 TABLET, FILM COATED, EXTENDED RELEASE ORAL
Status: COMPLETED | OUTPATIENT
Start: 2019-12-08 | End: 2019-12-08

## 2019-12-08 RX ADMIN — ISOSORBIDE MONONITRATE 60 MG: 60 TABLET, EXTENDED RELEASE ORAL at 22:35

## 2019-12-08 RX ADMIN — GABAPENTIN 600 MG: 300 CAPSULE ORAL at 22:35

## 2019-12-08 RX ADMIN — MAGNESIUM SULFATE HEPTAHYDRATE 1 G: 1 INJECTION, SOLUTION INTRAVENOUS at 00:08

## 2019-12-08 RX ADMIN — SIMVASTATIN 20 MG: 20 TABLET, FILM COATED ORAL at 22:35

## 2019-12-08 RX ADMIN — POTASSIUM CHLORIDE 20 MEQ: 750 TABLET, FILM COATED, EXTENDED RELEASE ORAL at 12:02

## 2019-12-08 RX ADMIN — POTASSIUM CHLORIDE 10 MEQ: 7.46 INJECTION, SOLUTION INTRAVENOUS at 10:01

## 2019-12-08 RX ADMIN — FINASTERIDE 5 MG: 5 TABLET, FILM COATED ORAL at 12:51

## 2019-12-08 RX ADMIN — LISINOPRIL 40 MG: 20 TABLET ORAL at 10:00

## 2019-12-08 RX ADMIN — ENOXAPARIN SODIUM 40 MG: 40 INJECTION, SOLUTION INTRAVENOUS; SUBCUTANEOUS at 22:31

## 2019-12-08 RX ADMIN — POTASSIUM CHLORIDE 20 MEQ: 750 TABLET, FILM COATED, EXTENDED RELEASE ORAL at 10:00

## 2019-12-08 RX ADMIN — POTASSIUM CHLORIDE 20 MEQ: 750 TABLET, FILM COATED, EXTENDED RELEASE ORAL at 14:00

## 2019-12-08 RX ADMIN — POTASSIUM CHLORIDE 40 MEQ: 1500 TABLET, EXTENDED RELEASE ORAL at 01:21

## 2019-12-08 RX ADMIN — ASPIRIN 81 MG CHEWABLE TABLET 81 MG: 81 TABLET CHEWABLE at 10:00

## 2019-12-08 RX ADMIN — TAMSULOSIN HYDROCHLORIDE 0.4 MG: 0.4 CAPSULE ORAL at 18:43

## 2019-12-08 RX ADMIN — POTASSIUM CHLORIDE 10 MEQ: 7.46 INJECTION, SOLUTION INTRAVENOUS at 09:00

## 2019-12-08 RX ADMIN — POTASSIUM CHLORIDE 10 MEQ: 7.46 INJECTION, SOLUTION INTRAVENOUS at 11:00

## 2019-12-08 RX ADMIN — METOPROLOL TARTRATE 25 MG: 25 TABLET ORAL at 18:43

## 2019-12-08 RX ADMIN — POTASSIUM CHLORIDE 10 MEQ: 7.46 INJECTION, SOLUTION INTRAVENOUS at 12:54

## 2019-12-08 RX ADMIN — METOPROLOL TARTRATE 25 MG: 25 TABLET ORAL at 10:00

## 2019-12-08 RX ADMIN — CEFTRIAXONE 1 G: 1 INJECTION, POWDER, FOR SOLUTION INTRAMUSCULAR; INTRAVENOUS at 18:43

## 2019-12-08 NOTE — PROGRESS NOTES
Problem: Mobility Impaired (Adult and Pediatric)  Goal: *Acute Goals and Plan of Care (Insert Text)  Description  Physical Therapy Goals  Initiated 12/8/2019 and to be accomplished within 7 day(s)  1. Patient will move from supine to sit and sit to supine in bed with supervision/set-up in order to facilitate eating meals in sitting. 2.  Patient will transfer from bed to chair and chair to bed with supervision/set-up using the least restrictive device in order to facilitate participation in sitting ADLs. 3.  Patient will perform sit to stand with supervision/set-up in order to prepare for standing ADLs. 4.  Patient will ambulate with minimal assistance/contact guard assist for >/=65 feet with the least restrictive device in order to facilitate improved ease with ambulation to the restroom. Outcome: Progressing Towards Goal   PHYSICAL THERAPY EVALUATION    Patient: Phyllis Hernández [de-identified]80 y.o. male)  Date: 12/8/2019  Primary Diagnosis: Hypokalemia [E87.6]  Hypokalemia [E87.6]        Precautions: Fall       PLOF: Modified I with RW and power chair    ASSESSMENT :  Patient is a 80 y.o. male who presents to Physical Therapy with diagnosis of Hypokalemia [E87.6]  Hypokalemia [E87.6]. Patient is supine in bed and agrees to participate in PT services. Upon objective evaluation, patient demonstrated full SLIM LE AROM, impaired SLIM LE strength in hip and ankle DF (R/L Psoas 4/4, quad 4+/4, ham 4+/4, ant tib 2+/3+), and decreased standing static and dynamic balance. Static standing balance is fair and requires constant CGA in order to ensure safety. Patient ambulated 6 ft with RW and required CGA/Min A for safety. Patient demonstrated increased trunk flexion and decreased SAVANNAH with ambulation and required cuing for body mechanics and tp modify to SAVANNAH. Patient requires between minimal assistance/contact guard assist and minimal assistance/contact guard assist for bed mobility, transfers and ambulation.  Patient can benefit from skilled PT interventions to decrease r/o falls and improve SLIM LE strength, increase walking/standing tolerance, and improve body mechanics mechanics with bed mobility and transfers to facilitate ADL's & overall functional status/quality of life. Patient will benefit from skilled intervention to address the above impairments. Patient's rehabilitation potential is considered to be Fair  Factors which may influence rehabilitation potential include:   [x]         None noted  []         Mental ability/status  []         Medical condition  []         Home/family situation and support systems  []         Safety awareness  []         Pain tolerance/management  []         Other:      PLAN :  Recommendations and Planned Interventions:   [x]           Bed Mobility Training             [x]    Neuromuscular Re-Education  [x]           Transfer Training                   []    Orthotic/Prosthetic Training  [x]           Gait Training                          []    Modalities  [x]           Therapeutic Exercises           []    Edema Management/Control  [x]           Therapeutic Activities            [x]    Family Training/Education  [x]           Patient Education  []           Other (comment):    Frequency/Duration: Patient will be followed by physical therapy 3-5x per week to address goals.   Discharge Recommendations: Home Health  Further Equipment Recommendations for Discharge: rolling walker (has)     SUBJECTIVE:   Patient denies pain    OBJECTIVE DATA SUMMARY:     Past Medical History:   Diagnosis Date    Benign prostatic hyperplasia with urinary retention     BPH (benign prostatic hyperplasia)     CAD (coronary artery disease)     Severe 3 vessel ( Cath 05/2014) Medical management    Colon cancer [facility])     CVA (cerebral infarction)     x2    Di[facility]     High cholesterol     History of colonoscopy     HLD (hyperlipidemia)     Hypertension     Osteoarthritis     Parkinson's disease ([facility] Shortness of breath     Stroke ([facility]     Tuberculosis of lung     Urinary retention      Past Surgical History:   Procedure Laterality Date    HX HEART CATHETERIZATION      severe 3 vessel- medical management 2014    HX ORTHOPAEDIC      R leg surgery    HX UROLOGICAL  10/25/2017    [facility]      Barriers to Learning/Limitations: None  Compensate with: N/A  Home Situation:  Home Situation  Home Environment: Private residence  Wheelchair Ramp: Yes  One/Two Story Residence: One story  Living Alone: No  Support Systems: Child(alena)  Patient Expects to be Discharged to[de-identified] Private residence  Current DME Used/Available at Home: Joaquín Spivey, rolling, Wheelchair, power  Critical Behavior:  Neurologic State: Appropriate for age  Orientation Level: Oriented X4  Cognition: Follows commands  Safety/Judgement: Fall prevention  Psychosocial  Patient Behaviors: Calm  Family  Behaviors: Calm; Cooperative  Needs Expressed: Educational  Purposeful Interaction: Yes     Family  Behaviors: Calm; Cooperative    Strength:    Strength: Generally decreased, functional(R/L Psoas 4/4, quad 4+/4, ham 4+/4, ant tib 2+/3+)    Range Of Motion:  AROM: Within functional limits    Functional Mobility:  Bed Mobility:     Supine to Sit: Minimum assistance  Sit to Supine: Minimum assistance  Scooting: Minimum assistance  Transfers:  Sit to Stand: Minimum assistance  Stand to Sit: Contact guard assistance  Balance:   Sitting: Impaired  Sitting - Static: Fair (occasional)  Sitting - Dynamic: Fair (occasional)  Standing: Impaired  Standing - Static: Fair  Standing - Dynamic : Poor  Ambulation/Gait Training:  Distance (ft): 6 Feet (ft)  Assistive Device: Walker, rolling  Ambulation - Level of Assistance: Contact guard assistance  Gait Abnormalities: Decreased step clearance  Base of Support: Narrowed  Stance: Time  Speed/Ximena: Slow  Step Length: Left shortened;Right shortened    Pain:  Pain level pre-treatment: 0/10   Pain level post-treatment: 0/10   Pain Intervention(s) : Medication (see MAR); Rest  Response to intervention: Nurse notified, See doc flow - Soraida    Activity Tolerance: Fair secondary to fatigue with functional mobility  Please refer to the flowsheet for vital signs taken during this treatment. After treatment:   []         Patient left in no apparent distress sitting up in chair  [x]         Patient left in no apparent distress in bed  [x]         Call bell left within reach  [x]         Nursing notified  []         Caregiver present  []         Bed alarm activated  []         SCDs applied    COMMUNICATION/EDUCATION:   [x]         Role of Physical Therapy in the acute care setting. [x]         Fall prevention education was provided and the patient/caregiver indicated understanding. [x]         Patient/family have participated as able in goal setting and plan of care. [x]         Patient/family agree to work toward stated goals and plan of care. [x]         Patient understands intent and goals of therapy, but is neutral about his/her participation. []         Patient is unable to participate in goal setting/plan of care: ongoing with therapy staff.  []         Other:     Thank you for this referral.  Stella Mcghee   Time Calculation: 14 mins

## 2019-12-08 NOTE — PROGRESS NOTES
21:45- Report received from [facility] on patient being admitted to room 3011 from the E.R.   22:15- Arrived to room 3011 via stretcher from the E.R- Family with patient. 22:34- Assessment completed- see flow sheet. High Fall risk -patient had fallen at home prior to admission. Skin abrasion on left knee (2.8 cm x 3 cm)- area cleansed with normal saline and foam dressing applied. Patient was incontinent of medium soft stool - boxer underpants and pants soiled-incontinence care given. Condom catheter applied. 00:15- Friend of patient is assisting him to eat meatloaf dinner. Friend is staying overnight at the bedside. Continue to monitor. Call light in reach. 04:00- No change in condition- continue to monitor. Call light in reach. Hourly rounding. 06:35- Incontinent of large amount of watery, brown stool. 07:45- Bedside report given to oncoming nurse, Cande Antonio RN.

## 2019-12-08 NOTE — ED NOTES
TRANSFER - ED to INPATIENT REPORT:    SBAR report made available to receiving floor on this patient being transferred to [address] [place] [address] ([facility])  for routine progression of care       Admitting diagnosis Hypokalemia [E87.6]    Information from the following report(s) SBAR, ED Summary, MAR and Recent Results was made available to receiving floor. Lines:   Peripheral IV 12/07/19 Posterior; Left Forearm (Active)   Site Assessment Clean, dry, & intact 12/7/2019 11:10 AM   Phlebitis Assessment 0 12/7/2019 11:10 AM   Infiltration Assessment 0 12/7/2019 11:10 AM   Dressing Status Clean, dry, & intact 12/7/2019 11:10 AM   Dressing Type Transparent 12/7/2019 11:10 AM   Hub Color/Line Status Pink 12/7/2019 11:10 AM        Medication list confirmed with patient    Opportunity for questions and clarification was provided. Patient is oriented to time, place, person and situation .   Patient is  continent and ambulatory with assist     Valuables transported with patient     Patient transported with:   Tech    MAP (Monitor): 71 =Monitored (most recent)  Vitals w/ MEWS Score (last day)     Date/Time MEWS Score Pulse Resp Temp BP Level of Consciousness SpO2    12/07/19 2045    84  16    110/57    96 %    12/07/19 2030    83  14    113/58    96 %    12/07/19 2015    87  16    105/63    97 %    12/07/19 2000    85  18    115/59    97 %    12/07/19 1945    86  14    119/72    95 %    12/07/19 1915    82  14    119/74    97 %    12/07/19 1900    74  14    118/65    97 %    12/07/19 1845    78  13    110/65    97 %    12/07/19 1830    79  14    112/73    97 %    12/07/19 1800    78  14    120/56    98 %    12/07/19 17:32:13              98 %    12/07/19 1715    78  14    105/59    98 %    12/07/19 1600    79  14    118/63    98 %    12/07/19 1545    88  16    110/63    98 %    12/07/19 1415    83  15    130/78    98 %    12/07/19 1300          119/67    98 %    12/07/19 1245         128/71    99 %    12/07/19 1230          110/79    99 %    12/07/19 1200          119/78    98 %    12/07/19 1115              99 %    12/07/19 1100          108/65    97 %    12/07/19 1010  1  93  16  97.4 °F (36.3 °C)  107/61  Alert  100 %

## 2019-12-08 NOTE — ED PROVIDER NOTES
EMERGENCY DEPARTMENT HISTORY AND PHYSICAL EXAM    9:17 PM      Date: 12/7/2019  Patient Name: Ana Wyatt    History of Presenting Illness     Chief Complaint   Patient presents with    Fall         History Provided By: Patient      Additional History (Context): Ana Wyatt is a 80 y.o. male who presents with ankle pain following fall. States that he was using his walker today when trying to go the bathroom, when it slipped in front of him and he fell down. Patient states that he landed on his buttocks and injured his right ankle. Patient is experiencing right ankle pain at this time. He did also suffer his abrasion to the left knee. Family is at bedside and states that he vomited once earlier today, nonbloody nonbilious. He has been more fatigued today. Patient denies any fevers, chills, chest pain, shortness of breath, abdominal pain, area, constipation. Patient does have a history of urinary problems. PCP: Raheem Barnett MD      Current Outpatient Medications   Medication Sig Dispense Refill    nitroglycerin (NITROSTAT) 0.4 mg SL tablet 1 Tab by SubLINGual route as needed for Chest Pain. 1 Bottle 3    finasteride (PROSCAR) 5 mg tablet Take 5 mg by mouth daily.  potassium chloride SR (KLOR-CON 10) 10 mEq tablet Take  by mouth.  glycopyrrolate (ROBINUL) 1 mg tablet Take 1 mg by mouth as needed. parkinson      isosorbide mononitrate ER (IMDUR) 60 mg CR tablet Take 1 Tab by mouth daily. (Patient taking differently: Take 60 mg by mouth nightly.) 30 Tab 6    ramipril (ALTACE) 10 mg capsule Take 1 Cap by mouth daily. (Patient taking differently: Take 5 mg by mouth daily.) 30 Cap 0    sitaGLIPtin (JANUVIA) 50 mg tablet Take 1 Tab by mouth daily. 30 Tab 0    docusate sodium (COLACE) 100 mg capsule Take 100 mg by mouth daily.  gabapentin (NEURONTIN) 600 mg tablet Take 600 mg by mouth nightly.  simvastatin (ZOCOR) 20 mg tablet Take 20 mg by mouth nightly.       tamsulosin (FLOMAX) 0.4 mg capsule Take 1 capsule by mouth daily (after dinner). 90 capsule 3    metoprolol (LOPRESSOR) 25 mg tablet Take 1 Tab by mouth two (2) times a day. 60 Tab 11    aspirin 81 mg chewable tablet Take 1 Tab by mouth daily. (Patient taking differently: Take 81 mg by mouth daily. havent taken ASA in 6 months) 90 Tab 3    carbidopa-levodopa (SINEMET-25/250)  mg per tablet Take 1 Tab by mouth three (3) times daily. Past History     Past Medical History:  Past Medical History:   Diagnosis Date    Benign prostatic hyperplasia with urinary retention     BPH (benign prostatic hyperplasia)     CAD (coronary artery disease)     Severe 3 vessel ( Cath 05/2014) Medical management    Colon cancer ([address]     CVA (cerebral infarction)     x2    Diabetes ([address]     High cholesterol     History of colonoscopy     HLD (hyperlipidemia)     Hypertension     Osteoarthritis     Parkinson's disease ([address]     Shortness of breath     Stroke ([address]     Tuberculosis of lung     Urinary retention        Past Surgical History:  Past Surgical History:   Procedure Laterality Date    HX HEART CATHETERIZATION      severe 3 vessel- medical management 2014    HX ORTHOPAEDIC      R leg surgery    [facility]X [facility]  10/25/2017    [facility]        Family History:  Family History   Problem Relation Age of Onset    Heart Disease Mother     Hypertension Mother     Hypertension Father     Heart Disease Father     Cancer Sister     Heart Disease Sister     Hypertension Sister        Social History:  Social History     Tobacco Use    Smoking status: Never Smoker    Smokeless tobacco: Never Used   Substance Use Topics    Alcohol use: No    Drug use: No       Allergies:  No Known Allergies      Review of Systems       Review of Systems   Constitutional: Negative for chills, fatigue and fever. HENT: Negative for rhinorrhea, sinus pressure, sinus pain, sneezing and sore throat.     Eyes: Negative for photophobia and visual disturbance. Respiratory: Negative for cough, shortness of breath and wheezing. Cardiovascular: Negative for chest pain and palpitations. Gastrointestinal: Positive for nausea and vomiting. Negative for abdominal pain, constipation and diarrhea. Genitourinary: Negative for dysuria, frequency and hematuria. Musculoskeletal: Positive for arthralgias. Negative for neck pain and neck stiffness. Skin: Negative for rash and wound. Neurological: Negative for dizziness, light-headedness and headaches. Psychiatric/Behavioral: Negative for agitation, behavioral problems and confusion. Physical Exam     Visit Vitals  /57   Pulse 84   Temp 97.4 °F (36.3 °C)   Resp 16   Ht 6' 1\" (1.854 m)   Wt 89.8 kg (198 lb)   SpO2 96%   BMI 26.12 kg/m²       Physical Exam  Constitutional:       General: He is not in acute distress. Appearance: Normal appearance. He is not ill-appearing. HENT:      Head: Normocephalic. Right Ear: Tympanic membrane normal.      Left Ear: Tympanic membrane normal.      Nose: No congestion or rhinorrhea. Mouth/Throat:      Mouth: Mucous membranes are moist.      Pharynx: No oropharyngeal exudate or posterior oropharyngeal erythema. Eyes:      General:         Right eye: No discharge. Left eye: No discharge. Conjunctiva/sclera: Conjunctivae normal.   Neck:      Musculoskeletal: Normal range of motion. No neck rigidity. Cardiovascular:      Rate and Rhythm: Normal rate. Pulses: Normal pulses. Heart sounds: No murmur. No gallop. Pulmonary:      Effort: Pulmonary effort is normal. No respiratory distress. Breath sounds: No wheezing. Abdominal:      General: There is no distension. Palpations: Abdomen is soft. Tenderness: There is no tenderness. Comments: No abdominal tenderness palpation. Abdomen is soft, nondistended. No guarding rigidity. Musculoskeletal:         General: Tenderness present.  No swelling. Comments: Patient has tenderness of the right lateral and medial ankle. No identifiable swelling or obvious deformity. Skin:     General: Skin is warm. Capillary Refill: Capillary refill takes less than 2 seconds. Coloration: Skin is not jaundiced. Findings: Abrasion present. No bruising. Comments: Patient has an abrasion to the left knee. Neurological:      General: No focal deficit present. Mental Status: He is alert and oriented to person, place, and time. Cranial Nerves: No cranial nerve deficit. Motor: No weakness. Comments: Patient is at his mental baseline. No focal neurological deficits. Psychiatric:         Mood and Affect: Mood normal.         Thought Content: Thought content normal.           Diagnostic Study Results     Labs -  Recent Results (from the past 12 hour(s))   CBC WITH AUTOMATED DIFF    Collection Time: 12/07/19 11:10 AM   Result Value Ref Range    WBC 16.1 (H) 4.6 - 13.2 K/uL    RBC 4.48 (L) 4.70 - 5.50 M/uL    HGB 12.7 (L) 13.0 - 16.0 g/dL    HCT 39.2 36.0 - 48.0 %    MCV 87.5 74.0 - 97.0 FL    MCH 28.3 24.0 - 34.0 PG    MCHC 32.4 31.0 - 37.0 g/dL    RDW 15.1 (H) 11.6 - 14.5 %    PLATELET 923 640 - 413 K/uL    MPV 9.5 9.2 - 11.8 FL    NEUTROPHILS 76 (H) 40 - 73 %    LYMPHOCYTES 16 (L) 21 - 52 %    MONOCYTES 7 3 - 10 %    EOSINOPHILS 1 0 - 5 %    BASOPHILS 0 0 - 2 %    ABS. NEUTROPHILS 12.3 (H) 1.8 - 8.0 K/UL    ABS. LYMPHOCYTES 2.6 0.9 - 3.6 K/UL    ABS. MONOCYTES 1.1 0.05 - 1.2 K/UL    ABS. EOSINOPHILS 0.1 0.0 - 0.4 K/UL    ABS.  BASOPHILS 0.0 0.0 - 0.1 K/UL    DF AUTOMATED     METABOLIC PANEL, COMPREHENSIVE    Collection Time: 12/07/19 11:10 AM   Result Value Ref Range    Sodium 140 136 - 145 mmol/L    Potassium 2.9 (LL) 3.5 - 5.5 mmol/L    Chloride 103 100 - 111 mmol/L    CO2 32 21 - 32 mmol/L    Anion gap 5 3.0 - 18 mmol/L    Glucose 132 (H) 74 - 99 mg/dL    BUN 16 7.0 - 18 MG/DL    Creatinine 1.44 (H) 0.6 - 1.3 MG/DL BUN/Creatinine ratio 11 (L) 12 - 20      GFR est AA 56 (L) >60 ml/min/1.73m2    GFR est non-AA 47 (L) >60 ml/min/1.73m2    Calcium 8.8 8.5 - 10.1 MG/DL    Bilirubin, total 0.7 0.2 - 1.0 MG/DL    ALT (SGPT) 17 16 - 61 U/L    AST (SGOT) 13 10 - 38 U/L    Alk.  phosphatase 101 45 - 117 U/L    Protein, total 6.7 6.4 - 8.2 g/dL    Albumin 3.2 (L) 3.4 - 5.0 g/dL    Globulin 3.5 2.0 - 4.0 g/dL    A-G Ratio 0.9 0.8 - 1.7     EKG, 12 LEAD, INITIAL    Collection Time: 12/07/19 11:22 AM   Result Value Ref Range    Ventricular Rate 78 BPM    Atrial Rate 78 BPM    P-R Interval 156 ms    QRS Duration 94 ms    Q-T Interval 404 ms    QTC Calculation (Bezet) 460 ms    Calculated P Axis 32 degrees    Calculated R Axis -34 degrees    Calculated T Axis -21 degrees    Diagnosis       Normal sinus rhythm  Left axis deviation  Minimal voltage criteria for LVH, may be normal variant ( R in aVL )  Nonspecific ST and T wave abnormality  Abnormal ECG  When compared with ECG of 07-APR-2019 11:19,  QRS axis shifted left    Confirmed by Judge Willoughby ([id number]) on 12/7/2019 5:33:03 PM     URINALYSIS W/ RFLX MICROSCOPIC    Collection Time: 12/07/19  2:27 PM   Result Value Ref Range    Color YELLOW      Appearance CLOUDY      Specific gravity 1.026 1.005 - 1.030      pH (UA) 5.5 5.0 - 8.0      Protein 300 (A) NEG mg/dL    Glucose NEGATIVE  NEG mg/dL    Ketone TRACE (A) NEG mg/dL    Bilirubin SMALL (A) NEG      Blood LARGE (A) NEG      Urobilinogen 1.0 0.2 - 1.0 EU/dL    Nitrites NEGATIVE  NEG      Leukocyte Esterase MODERATE (A) NEG     URINE MICROSCOPIC ONLY    Collection Time: 12/07/19  2:27 PM   Result Value Ref Range    WBC >100 (H) 0 - 4 /hpf    RBC 21 to 35 0 - 5 /hpf    Epithelial cells FEW 0 - 5 /lpf    Bacteria 4+ (A) NEG /hpf   POC CHEM8    Collection Time: 12/07/19  4:17 PM   Result Value Ref Range    CO2, POC 31 (H) 19 - 24 MMOL/L    Glucose,  (H) 74 - 106 MG/DL    BUN, POC 17 7 - 18 MG/DL    Creatinine, POC 1.3 0.6 - 1.3 MG/DL GFRAA, POC >60 >60 ml/min/1.73m2    GFRNA, POC 52 (L) >60 ml/min/1.73m2    Sodium,  136 - 145 MMOL/L    Potassium, POC 2.9 (LL) 3.5 - 5.5 MMOL/L    Calcium, ionized (POC) 1.10 (L) 1.12 - 1.32 mmol/L    Chloride, POC 97 (L) 100 - 108 MMOL/L    Anion gap, POC 18 10 - 20      Hematocrit, POC 35 (L) 36 - 49 %    Hemoglobin, POC 11.9 (L) 12 - 16 G/DL   METABOLIC PANEL, BASIC    Collection Time: 12/07/19  7:55 PM   Result Value Ref Range    Sodium 139 136 - 145 mmol/L    Potassium 2.8 (LL) 3.5 - 5.5 mmol/L    Chloride 103 100 - 111 mmol/L    CO2 30 21 - 32 mmol/L    Anion gap 6 3.0 - 18 mmol/L    Glucose 139 (H) 74 - 99 mg/dL    BUN 15 7.0 - 18 MG/DL    Creatinine 1.32 (H) 0.6 - 1.3 MG/DL    BUN/Creatinine ratio 11 (L) 12 - 20      GFR est AA >60 >60 ml/min/1.73m2    GFR est non-AA 51 (L) >60 ml/min/1.73m2    Calcium 8.6 8.5 - 10.1 MG/DL   MAGNESIUM    Collection Time: 12/07/19  7:55 PM   Result Value Ref Range    Magnesium 1.8 1.6 - 2.6 mg/dL       Radiologic Studies -   CT PELV WO CONT   Final Result   IMPRESSION:      1. No evidence of fracture. Generalized osseous demineralization. 2. Bladder wall thickening, with foci of gas in the bladder wall. 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 3. Prominent stool burden in the rectum noted. XR KNEE LT 3 V   Final Result   IMPRESSION:      No acute osseous findings. Mild tricompartmental DJD. XR ANKLE RT MIN 3 V   Final Result   IMPRESSION:      No evidence of acute fracture. Prominent degenerative changes of the ankle. Medical Decision Making   I am the first provider for this patient. I reviewed the vital signs, available nursing notes, past medical history, past surgical history, family history and social history. Vital Signs-Reviewed the patient's vital signs.     Records Reviewed: Nursing Notes (Time of Review: 9:17 PM)    ED Course: Progress Notes, Reevaluation, and Consults:  Patient was found to have edema of the bladder, he was found to have a urinary tract infection. Patient was straight cathed for urine. He was also found to be hypokalemic. Patient was given 40 mEq of oral potassium and 10 mEq of IV potassium. Repeat potassium was 2.9 on basic chemistry. Time: 18:57. Spoke with Dr. Rudy Lopez, hospitalist.  He states that he would like a formal BMP to be done to recheck the potassium. Called Dr. Rudy Lopez back regarding the potassium, which is now 2.8. He will admit the patient. Provider Notes (Medical Decision Making):   MDM  Number of Diagnoses or Management Options  Abrasion of left knee, initial encounter:   Acute cystitis without hematuria:   Acute right ankle pain:   BREE (acute kidney injury) ([address]):   Fall, initial encounter:   Diagnosis management comments: Patient is an 68-year-old male who presented for a fall. Initial orders were done by mid-level in triage. The patient was found to have edema of the bladder, most consistent with urinary tract infection. Patient was positive for urinary tract infection. He was also found to be hypokalemic. Despite oral and IV replacement, his potassium did not increase. He will be admitted for further evaluation. Critical Care Time: 0      Diagnosis     Clinical Impression:   1. Acute cystitis without hematuria    2. BREE (acute kidney injury) ([address]    3. Abrasion of left knee, initial encounter    4. Acute right ankle pain    5. Fall, initial encounter        Disposition: Admit    Follow-up Information    None          Patient's Medications   Start Taking    No medications on file   Continue Taking    ASPIRIN 81 MG CHEWABLE TABLET    Take 1 Tab by mouth daily. CARBIDOPA-LEVODOPA (SINEMET-25/250)  MG PER TABLET    Take 1 Tab by mouth three (3) times daily. DOCUSATE SODIUM (COLACE) 100 MG CAPSULE    Take 100 mg by mouth daily. FINASTERIDE (PROSCAR) 5 MG TABLET    Take 5 mg by mouth daily.     GABAPENTIN (NEURONTIN) 600 MG TABLET    Take 600 mg by mouth nightly. GLYCOPYRROLATE (ROBINUL) 1 MG TABLET    Take 1 mg by mouth as needed. parkinson    ISOSORBIDE MONONITRATE ER (IMDUR) 60 MG CR TABLET    Take 1 Tab by mouth daily. METOPROLOL (LOPRESSOR) 25 MG TABLET    Take 1 Tab by mouth two (2) times a day. NITROGLYCERIN (NITROSTAT) 0.4 MG SL TABLET    1 Tab by SubLINGual route as needed for Chest Pain. POTASSIUM CHLORIDE SR (KLOR-CON 10) 10 MEQ TABLET    Take  by mouth. RAMIPRIL (ALTACE) 10 MG CAPSULE    Take 1 Cap by mouth daily. SIMVASTATIN (ZOCOR) 20 MG TABLET    Take 20 mg by mouth nightly. SITAGLIPTIN (JANUVIA) 50 MG TABLET    Take 1 Tab by mouth daily. TAMSULOSIN (FLOMAX) 0.4 MG CAPSULE    Take 1 capsule by mouth daily (after dinner). These Medications have changed    No medications on file   Stop Taking    No medications on file     _______________________________    Please note that this dictation was completed with Goozzy, the [employer] voice recognition software. Quite often unanticipated grammatical, syntax, homophones, and other interpretive errors are inadvertently transcribed by the computer software. Please disregard these errors. Please excuse any errors that have escaped final proofreading.

## 2019-12-08 NOTE — PROGRESS NOTES
Problem: Pressure Injury - Risk of  Goal: *Prevention of pressure injury  Description  Document Stefan Scale and appropriate interventions in the flowsheet. Outcome: Progressing Towards Goal  Note: Pressure Injury Interventions:  Sensory Interventions: Assess changes in LOC, Float heels, Maintain/enhance activity level, Pressure redistribution bed/mattress (bed type), Keep linens dry and wrinkle-free, Minimize linen layers, Pad between skin to skin, Turn and reposition approx. every two hours (pillows and wedges if needed)    Moisture Interventions: Absorbent underpads, Check for incontinence Q2 hours and as needed, Apply protective barrier, creams and emollients, Moisture barrier, Offer toileting Q_hr    Activity Interventions: PT/OT evaluation, Pressure redistribution bed/mattress(bed type)    Mobility Interventions: Float heels, Pressure redistribution bed/mattress (bed type), PT/OT evaluation, Turn and reposition approx. every two hours(pillow and wedges)    Nutrition Interventions: Document food/fluid/supplement intake                     Problem: Patient Education: Go to Patient Education Activity  Goal: Patient/Family Education  Outcome: Progressing Towards Goal     Problem: Falls - Risk of  Goal: *Absence of Falls  Description  Document Ryan Melton Fall Risk and appropriate interventions in the flowsheet.   Outcome: Progressing Towards Goal  Note: Fall Risk Interventions:  Mobility Interventions: Communicate number of staff needed for ambulation/transfer, PT Consult for mobility concerns, Bed/chair exit alarm         Medication Interventions: Bed/chair exit alarm, Evaluate medications/consider consulting pharmacy, Teach patient to arise slowly    Elimination Interventions: Bed/chair exit alarm, Call light in reach, Urinal in reach, Patient to call for help with toileting needs    History of Falls Interventions: Bed/chair exit alarm, Door open when patient unattended, Evaluate medications/consider consulting pharmacy, Investigate reason for fall         Problem: Patient Education: Go to Patient Education Activity  Goal: Patient/Family Education  Outcome: Progressing Towards Goal

## 2019-12-08 NOTE — PROGRESS NOTES
Progress Note      Patient: Yuridia Wooten               Sex: male          DOA: 12/7/2019       YOB: 1933      Age:  80 y.o.        LOS:  LOS: 0 days             CHIEF COMPLAINT:  Fall      Assessment/Plan:     Principal Problem:    Hypokalemia (12/7/2019)    Active Problems:    HTN (hypertension) (5/5/2014)      Pure hypercholesterolemia (5/5/2014)      DM (diabetes mellitus) ([facility] (5/5/2014)      Parkinson's disease ([facility] ()      Type 2 diabetes mellitus with diabetic neuropathy ([facility] (11/5/2018)      UTI (urinary tract infection) (12/7/2019)        PLAN:  1)  Hypokalemia   - still low. Cont IV and PP replacements              - magnesium low normal - 1 g IV to be given     2)  DM              - SSI     3)  UTI              - rocephin              - f/u urine culture    4) Diarrhea   - likely reason for hypoKalemia   - check stool studies, C. diff     5) DVT prophylaxis:  Lovenox    6) multiple falls. Will get PT/OT consult for disposition    7) DM neuropathy - cont gabapentin    Subjective:     Pt denies complaints except for neuropathic foot pain. Family worried about multiple falls. Objective:     Visit Vitals  /60   Pulse 89   Temp 98 °F (36.7 °C)   Resp 18   Ht 6' 1\" (1.854 m)   Wt 89.8 kg (198 lb)   SpO2 97%   BMI 26.12 kg/m²        Physical Exam:  General appearance: alert, cooperative, no distress, appears stated age  Head: Normocephalic, without obvious abnormality, atraumatic  Neck: supple, trachea midline  Lungs: clear to auscultation bilaterally  Heart: regular rate and rhythm, S1, S2 normal, no murmur, click, rub or gallop  Abdomen: soft, non-tender.  Bowel sounds normal. No masses,  no organomegaly  Extremities: extremities normal, atraumatic, no cyanosis or edema  Skin: Skin color, texture, turgor normal. No rashes or lesions  Neurologic:  tremors noted at times    Lab/Data Reviewed:  CMP:   Lab Results   Component Value Date/Time     12/08/2019 06:05 AM    K 2.9 (LL) 12/08/2019 06:05 AM     12/08/2019 06:05 AM    CO2 32 12/08/2019 06:05 AM    AGAP 6 12/08/2019 06:05 AM     (H) 12/08/2019 06:05 AM    BUN 15 12/08/2019 06:05 AM    CREA 1.24 12/08/2019 06:05 AM    GFRAA >60 12/08/2019 06:05 AM    GFRNA 55 (L) 12/08/2019 06:05 AM    CA 8.4 (L) 12/08/2019 06:05 AM    MG 1.8 12/07/2019 07:55 PM     CBC:   Lab Results   Component Value Date/Time    WBC 11.3 12/08/2019 06:05 AM    HGB 11.4 (L) 12/08/2019 06:05 AM    HCT 35.9 (L) 12/08/2019 06:05 AM     12/08/2019 06:05 AM       Imaging Reviewed:  No results found.

## 2019-12-08 NOTE — PROGRESS NOTES
met with Patient completed the initial Spiritual Assessment of the patient, and offered Pastoral Care, see flow sheets for interventions. Patient does not have any [employer]/cultural needs that will affect patients preferences in health care. Charted reviewed. Chaplains will continue to follow and will provide pastoral care on an as needed/requested basis.       Omid Valentin  [phone]

## 2019-12-09 LAB
ANION GAP SERPL CALC-SCNC: 4 MMOL/L (ref 3–18)
BASOPHILS # BLD: 0 K/UL (ref 0–0.1)
BASOPHILS NFR BLD: 0 % (ref 0–2)
BUN SERPL-MCNC: 16 MG/DL (ref 7–18)
BUN/CREAT SERPL: 14 (ref 12–20)
C DIFF GDH STL QL: NEGATIVE
C DIFF TOX A+B STL QL IA: NEGATIVE
CALCIUM SERPL-MCNC: 8.3 MG/DL (ref 8.5–10.1)
CHLORIDE SERPL-SCNC: 108 MMOL/L (ref 100–111)
CO2 SERPL-SCNC: 28 MMOL/L (ref 21–32)
CREAT SERPL-MCNC: 1.18 MG/DL (ref 0.6–1.3)
DIFFERENTIAL METHOD BLD: ABNORMAL
EOSINOPHIL # BLD: 0.3 K/UL (ref 0–0.4)
EOSINOPHIL NFR BLD: 4 % (ref 0–5)
ERYTHROCYTE [DISTWIDTH] IN BLOOD BY AUTOMATED COUNT: 15.6 % (ref 11.6–14.5)
GLUCOSE BLD STRIP.AUTO-MCNC: 110 MG/DL (ref 70–110)
GLUCOSE BLD STRIP.AUTO-MCNC: 114 MG/DL (ref 70–110)
GLUCOSE BLD STRIP.AUTO-MCNC: 96 MG/DL (ref 70–110)
GLUCOSE SERPL-MCNC: 97 MG/DL (ref 74–99)
HCT VFR BLD AUTO: 32.6 % (ref 36–48)
HGB BLD-MCNC: 10.3 G/DL (ref 13–16)
INTERPRETATION: NORMAL
LYMPHOCYTES # BLD: 2.3 K/UL (ref 0.9–3.6)
LYMPHOCYTES NFR BLD: 28 % (ref 21–52)
MAGNESIUM SERPL-MCNC: 2 MG/DL (ref 1.6–2.6)
MCH RBC QN AUTO: 28.1 PG (ref 24–34)
MCHC RBC AUTO-ENTMCNC: 31.6 G/DL (ref 31–37)
MCV RBC AUTO: 88.8 FL (ref 74–97)
MONOCYTES # BLD: 0.6 K/UL (ref 0.05–1.2)
MONOCYTES NFR BLD: 7 % (ref 3–10)
NEUTS SEG # BLD: 4.9 K/UL (ref 1.8–8)
NEUTS SEG NFR BLD: 61 % (ref 40–73)
PHOSPHATE SERPL-MCNC: 3.1 MG/DL (ref 2.5–4.9)
PLATELET # BLD AUTO: 218 K/UL (ref 135–420)
PMV BLD AUTO: 9.7 FL (ref 9.2–11.8)
POTASSIUM SERPL-SCNC: 3.1 MMOL/L (ref 3.5–5.5)
RBC # BLD AUTO: 3.67 M/UL (ref 4.7–5.5)
SODIUM SERPL-SCNC: 140 MMOL/L (ref 136–145)
WBC # BLD AUTO: 8 K/UL (ref 4.6–13.2)
WBC #/AREA STL HPF: NORMAL /HPF

## 2019-12-09 PROCEDURE — 36415 COLL VENOUS BLD VENIPUNCTURE: CPT

## 2019-12-09 PROCEDURE — 65660000000 HC RM CCU STEPDOWN

## 2019-12-09 PROCEDURE — 85025 COMPLETE CBC W/AUTO DIFF WBC: CPT

## 2019-12-09 PROCEDURE — 83735 ASSAY OF MAGNESIUM: CPT

## 2019-12-09 PROCEDURE — 97166 OT EVAL MOD COMPLEX 45 MIN: CPT

## 2019-12-09 PROCEDURE — 82962 GLUCOSE BLOOD TEST: CPT

## 2019-12-09 PROCEDURE — 84100 ASSAY OF PHOSPHORUS: CPT

## 2019-12-09 PROCEDURE — 74011000258 HC RX REV CODE- 258: Performed by: HOSPITALIST

## 2019-12-09 PROCEDURE — 74011250636 HC RX REV CODE- 250/636: Performed by: HOSPITALIST

## 2019-12-09 PROCEDURE — 74011250637 HC RX REV CODE- 250/637: Performed by: INTERNAL MEDICINE

## 2019-12-09 PROCEDURE — 80048 BASIC METABOLIC PNL TOTAL CA: CPT

## 2019-12-09 PROCEDURE — 74011250637 HC RX REV CODE- 250/637: Performed by: HOSPITALIST

## 2019-12-09 RX ORDER — POTASSIUM CHLORIDE 750 MG/1
40 TABLET, EXTENDED RELEASE ORAL 2 TIMES DAILY
Status: COMPLETED | OUTPATIENT
Start: 2019-12-09 | End: 2019-12-10

## 2019-12-09 RX ORDER — UREA 10 %
2 LOTION (ML) TOPICAL 2 TIMES DAILY
Status: DISCONTINUED | OUTPATIENT
Start: 2019-12-09 | End: 2019-12-12 | Stop reason: HOSPADM

## 2019-12-09 RX ORDER — CARBIDOPA AND LEVODOPA 50; 200 MG/1; MG/1
1 TABLET, EXTENDED RELEASE ORAL 3 TIMES DAILY
Status: DISCONTINUED | OUTPATIENT
Start: 2019-12-09 | End: 2019-12-12 | Stop reason: HOSPADM

## 2019-12-09 RX ADMIN — POTASSIUM CHLORIDE 40 MEQ: 10 TABLET, EXTENDED RELEASE ORAL at 17:38

## 2019-12-09 RX ADMIN — DOCUSATE SODIUM 100 MG: 100 CAPSULE, LIQUID FILLED ORAL at 09:58

## 2019-12-09 RX ADMIN — METOPROLOL TARTRATE 25 MG: 25 TABLET ORAL at 09:58

## 2019-12-09 RX ADMIN — ASPIRIN 81 MG CHEWABLE TABLET 81 MG: 81 TABLET CHEWABLE at 09:57

## 2019-12-09 RX ADMIN — Medication 2 TABLET: at 17:38

## 2019-12-09 RX ADMIN — ENOXAPARIN SODIUM 40 MG: 40 INJECTION, SOLUTION INTRAVENOUS; SUBCUTANEOUS at 23:30

## 2019-12-09 RX ADMIN — FINASTERIDE 5 MG: 5 TABLET, FILM COATED ORAL at 09:57

## 2019-12-09 RX ADMIN — CEFTRIAXONE 1 G: 1 INJECTION, POWDER, FOR SOLUTION INTRAMUSCULAR; INTRAVENOUS at 17:36

## 2019-12-09 RX ADMIN — Medication 2 TABLET: at 09:58

## 2019-12-09 RX ADMIN — ISOSORBIDE MONONITRATE 60 MG: 60 TABLET, EXTENDED RELEASE ORAL at 23:25

## 2019-12-09 RX ADMIN — GABAPENTIN 600 MG: 300 CAPSULE ORAL at 23:25

## 2019-12-09 RX ADMIN — POTASSIUM CHLORIDE 40 MEQ: 10 TABLET, EXTENDED RELEASE ORAL at 10:12

## 2019-12-09 RX ADMIN — LISINOPRIL 40 MG: 20 TABLET ORAL at 09:58

## 2019-12-09 RX ADMIN — METOPROLOL TARTRATE 25 MG: 25 TABLET ORAL at 17:38

## 2019-12-09 RX ADMIN — CARBIDOPA AND LEVODOPA 1 TABLET: 50; 200 TABLET, EXTENDED RELEASE ORAL at 23:35

## 2019-12-09 RX ADMIN — SIMVASTATIN 20 MG: 20 TABLET, FILM COATED ORAL at 23:25

## 2019-12-09 RX ADMIN — TAMSULOSIN HYDROCHLORIDE 0.4 MG: 0.4 CAPSULE ORAL at 17:38

## 2019-12-09 NOTE — PROGRESS NOTES
Discharge/Transition Planning    Problem: Discharge Planning  Goal: *Discharge to safe environment  Outcome: Resolved/Met      Home with resumption of home health    Pt remains wanting Home with Home Health when medically ready to discharge      Faith Medina RN BSN  Outcomes Manager  Pager # [phone]

## 2019-12-09 NOTE — PROGRESS NOTES
Bedside and Verbal shift change report given to JACKELYN Quigley (oncoming nurse) by Vinnie Lynn (offgoing nurse). Report included the following information SBAR, Kardex, MAR and Recent Results     0900 patient had one bought of diarrhea  Ambulated to bathroom with walker. 1640Patient family brought in home meds reviewed med list patient is no longer taking Sinemet, paged physician to try to update MAR.     1900 Bedside and Verbal shift change report given to JACKELYN Capellan (oncoming nurse) by Wero Goyal (offgoing nurse). Report included the following information SBAR, Kardex, MAR and Recent Results. patient was incontinent of bladder condom cath reapplied and bed linens changed.

## 2019-12-09 NOTE — PROGRESS NOTES
0730 - Bedside shift change report given to Harpreet (oncoming nurse) by Carrie Magana (offgoing nurse). Report included the following information SBAR, Kardex, Procedure Summary, Intake/Output, MAR and Recent Results. 9699 - AM meds administered; pt tolerated well. 1730 - PM oral meds administered; pt tolerated well.     1756 - Ceftriaxone hung. 1800 - Pt's IV pump beeping - flushed IV, leaking - secured with tape; flushed well. Restarted IV med.     1952 - Bedside shift change report given to Humble Olvera (oncoming nurse) by Roseanne Manning (offgoing nurse). Report included the following information SBAR, Kardex, Procedure Summary, Intake/Output, MAR and Recent Results.

## 2019-12-09 NOTE — PROGRESS NOTES
Problem: Self Care Deficits Care Plan (Adult)  Goal: *Acute Goals and Plan of Care (Insert Text)  Description  Occupational Therapy Goals  Initiated 12/9/2019 within 7 day(s). 1.  Patient will perform upper body dressing and bathing with modified independence. 2.  Patient will perform lower body dressing and bathing with supervision/set-up. 3.  Patient will perform toilet transfers with modified independence. 4.  Patient will perform all aspects of toileting with modified independence. 5.  Patient will participate in upper extremity therapeutic exercise/activities with independence for 8 minutes. Prior Level of Function:  Patient receiving HH OT/PT and HHA services (2X/wk). Outcome: Progressing Towards Goal   OCCUPATIONAL THERAPY EVALUATION    Patient: Arlyn Burkitt [de-identified]80 y.o. male)  Date: 12/9/2019  Primary Diagnosis: Hypokalemia [E87.6]  Hypokalemia [E87.6]        Precautions:   Fall, Other (comment)([employer])  PLOF: See above    ASSESSMENT :  Based on the objective data described below, the patient presents in bed with HOB elevated. Patient was agreeable to OT assessment and reported that he has been receiving OT and PT services in the home setting, as well as HHA coming 2X/week to assist with bathing. Patient was able to sit EOB today with SBA-supervision and required MIN A to return to supine position from sitting. Demonstrated appropriate sitting balance during activities today. Patient was able to come to standing from sitting given supervision and took 2 side steps toward [facility]. Patient demonstrating UB strength WFL, with the exception of shoulders which were 4-/5. Skilled intervention to address shoulder strengthening, I for toileting, and ADLs is recommended. Patient will benefit from skilled intervention to address the above impairments.   Patient's rehabilitation potential is considered to be Good  Factors which may influence rehabilitation potential include:   [x]             None noted  []             Mental ability/status  []             Medical condition  []             Home/family situation and support systems  []             Safety awareness  []             Pain tolerance/management  []             Other:      PLAN :  Recommendations and Planned Interventions:   [x]               Self Care Training                  [x]      Therapeutic Activities  [x]               Functional Mobility Training   []      Cognitive Retraining  [x]               Therapeutic Exercises           [x]      Endurance Activities  []               Balance Training                    []      Neuromuscular Re-Education  []               Visual/Perceptual Training     [x]      Home Safety Training  [x]               Patient Education                   []      Family Training/Education  []               Other (comment):    Frequency/Duration: Patient will be followed by occupational therapy 3-5 times a week to address goals. Discharge Recommendations: Home Health  Further Equipment Recommendations for Discharge: N/A     SUBJECTIVE:   Patient stated i'm going to be discharged tomorrow.     OBJECTIVE DATA SUMMARY:     Past Medical History:   Diagnosis Date    Benign prostatic hyperplasia with urinary retention     BPH (benign prostatic hyperplasia)     CAD (coronary artery disease)     Severe 3 vessel ( Cath 05/2014) Medical management    Colon cancer [facility])     CVA (cerebral infarction)     x2    Diabetes ([address]     High cholesterol     History of colonoscopy     HLD (hyperlipidemia)     Hypertension     Osteoarthritis     Parkinson's disease ([address]     Shortness of breath     Stroke ([address]     Tuberculosis of lung     Urinary retention      Past Surgical History:   Procedure Laterality Date    HX HEART CATHETERIZATION      severe 3 vessel- medical management 2014    HX ORTHOPAEDIC      R leg surgery    HX UROLOGICAL  10/25/2017    [facility]      Barriers to Learning/Limitations: no  Compensate with: visual, verbal, tactile, kinesthetic cues/model    Home Situation:   Home Situation  Home Environment: Private residence  # Steps to Enter: 3  Wheelchair Ramp: Yes  One/Two Story Residence: One story  Living Alone: No  Support Systems: Child(alena)  Patient Expects to be Discharged to[de-identified] Private residence  Current DME Used/Available at Home: Walker, rolling, Wheelchair, power, Shower chair  Tub or Shower Type: Tub/Shower combination  [x]  Right hand dominant   []  Left hand dominant    Cognitive/Behavioral Status:  Neurologic State: Alert  Orientation Level: Oriented X4  Cognition: Follows commands       Skin: Visibly intact  Edema: None noted    Vision/Perceptual:    Acuity: Within Defined Limits      Coordination: BUE  Coordination: Within functional limits  Fine Motor Skills-Upper: Left Intact; Right Intact    Gross Motor Skills-Upper: Left Intact; Right Intact    Balance:  Sitting: Intact; Without support  Sitting - Static: Good (unsupported)  Sitting - Dynamic: Good (unsupported)  Standing: Impaired  Standing - Static: Fair  Standing - Dynamic : Poor    Strength: BUE  Strength: Generally decreased, functional(B sh weakness noted 4-/5)    Range of Motion: BUE  AROM: Within functional limits(Decreased in shoulders)    Functional Mobility and Transfers for ADLs:  Bed Mobility:  Supine to Sit: Stand-by assistance;Supervision  Sit to Supine: Minimum assistance     Transfers:  Sit to Stand: Stand-by assistance;Supervision  Stand to Sit: Supervision       ADL Assessment:   Feeding: Independent    Oral Facial Hygiene/Grooming: Independent    Bathing: Minimum assistance    Upper Body Dressing: Minimum assistance    Lower Body Dressing: Minimum assistance; Moderate assistance    Toileting: Supervision;Minimum assistance      ADL Intervention:  Patient declined ADL intervention today. Pain:  Pain level pre-treatment: 0/10   Pain level post-treatment: 0/10   Pain Intervention(s): Medication (see MAR);  Rest, Ice, Repositioning   Response to intervention: Nurse notified, See doc flow    Activity Tolerance:   Fair for bed mobility and standing today. Please refer to the flowsheet for vital signs taken during this treatment. After treatment:   [] Patient left in no apparent distress sitting up in chair  [x] Patient left in no apparent distress in bed  [x] Call bell left within reach  [] Nursing notified  [] Caregiver present  [] Bed alarm activated    COMMUNICATION/EDUCATION:   [x] Role of Occupational Therapy in the acute care setting  [x] Home safety education was provided and the patient/caregiver indicated understanding. [] Patient/family have participated as able in goal setting and plan of care. [x] Patient/family agree to work toward stated goals and plan of care. [] Patient understands intent and goals of therapy, but is neutral about his/her participation. [] Patient is unable to participate in goal setting and plan of care. Thank you for this referral.  Jama Perez, OTR/L  Time Calculation: 14 mins    Eval Complexity: History: MEDIUM Complexity : Expanded review of history including physical, cognitive and psychosocial  history ; Examination: MEDIUM Complexity : 3-5 performance deficits relating to physical, cognitive , or psychosocial skils that result in activity limitations and / or participation restrictions; Decision Making:MEDIUM Complexity : Patient may present with comorbidities that affect occupational performnce.  Miniml to moderate modification of tasks or assistance (eg, physical or verbal ) with assesment(s) is necessary to enable patient to complete evaluation

## 2019-12-09 NOTE — PROGRESS NOTES
Problem: Pressure Injury - Risk of  Goal: *Prevention of pressure injury  Description  Document Stefan Scale and appropriate interventions in the flowsheet. Outcome: Progressing Towards Goal  Note: Pressure Injury Interventions:  Sensory Interventions: Assess need for specialty bed, Float heels, Keep linens dry and wrinkle-free, Pad between skin to skin, Pressure redistribution bed/mattress (bed type), Turn and reposition approx. every two hours (pillows and wedges if needed)    Moisture Interventions: Absorbent underpads, Apply protective barrier, creams and emollients, Check for incontinence Q2 hours and as needed, Internal/External urinary devices, Maintain skin hydration (lotion/cream), Minimize layers, Moisture barrier    Activity Interventions: Pressure redistribution bed/mattress(bed type), Increase time out of bed, PT/OT evaluation    Mobility Interventions: Float heels, HOB 30 degrees or less, Turn and reposition approx. every two hours(pillow and wedges), PT/OT evaluation, Pressure redistribution bed/mattress (bed type)    Nutrition Interventions: Document food/fluid/supplement intake                     Problem: Patient Education: Go to Patient Education Activity  Goal: Patient/Family Education  Outcome: Progressing Towards Goal     Problem: Falls - Risk of  Goal: *Absence of Falls  Description  Document Vickii Bridges Fall Risk and appropriate interventions in the flowsheet.   Outcome: Progressing Towards Goal  Note: Fall Risk Interventions:  Mobility Interventions: Bed/chair exit alarm, Communicate number of staff needed for ambulation/transfer, Patient to call before getting OOB, PT Consult for mobility concerns         Medication Interventions: Bed/chair exit alarm, Evaluate medications/consider consulting pharmacy, Patient to call before getting OOB, Teach patient to arise slowly    Elimination Interventions: Call light in reach, Bed/chair exit alarm, Patient to call for help with toileting needs, Stay With Me (per policy), Toilet paper/wipes in reach, Toileting schedule/hourly rounds    History of Falls Interventions: Bed/chair exit alarm, Door open when patient unattended, Evaluate medications/consider consulting pharmacy         Problem: Patient Education: Go to Patient Education Activity  Goal: Patient/Family Education  Outcome: Progressing Towards Goal     Problem: Risk for Spread of Infection  Goal: Prevent transmission of infectious organism to others  Description  Prevent the transmission of infectious organisms to other patients, staff members, and visitors.   Outcome: Progressing Towards Goal     Problem: Patient Education:  Go to Education Activity  Goal: Patient/Family Education  Outcome: Progressing Towards Goal

## 2019-12-09 NOTE — PROGRESS NOTES
Problem: Diabetes Self-Management  Goal: *Disease process and treatment process  Description  Define diabetes and identify own type of diabetes; list 3 options for treating diabetes. Outcome: Progressing Towards Goal  Goal: *Incorporating nutritional management into lifestyle  Description  Describe effect of type, amount and timing of food on blood glucose; list 3 methods for planning meals. Outcome: Progressing Towards Goal  Goal: *Incorporating physical activity into lifestyle  Description  State effect of exercise on blood glucose levels. Outcome: Progressing Towards Goal  Goal: *Developing strategies to promote health/change behavior  Description  Define the ABC's of diabetes; identify appropriate screenings, schedule and personal plan for screenings. Outcome: Progressing Towards Goal  Goal: *Using medications safely  Description  State effect of diabetes medications on diabetes; name diabetes medication taking, action and side effects. Outcome: Progressing Towards Goal  Goal: *Monitoring blood glucose, interpreting and using results  Description  Identify recommended blood glucose targets  and personal targets. Outcome: Progressing Towards Goal  Goal: *Prevention, detection, treatment of acute complications  Description  List symptoms of hyper- and hypoglycemia; describe how to treat low blood sugar and actions for lowering  high blood glucose level. Outcome: Progressing Towards Goal  Goal: *Prevention, detection and treatment of chronic complications  Description  Define the natural course of diabetes and describe the relationship of blood glucose levels to long term complications of diabetes.   Outcome: Progressing Towards Goal  Goal: *Developing strategies to address psychosocial issues  Description  Describe feelings about living with diabetes; identify support needed and support network  Outcome: Progressing Towards Goal  Goal: *Insulin pump training  Outcome: Progressing Towards Goal  Goal: *Sick day guidelines  Outcome: Progressing Towards Goal  Goal: *Patient Specific Goal (EDIT GOAL, INSERT TEXT)  Outcome: Progressing Towards Goal     Problem: Pressure Injury - Risk of  Goal: *Prevention of pressure injury  Description  Document Stefan Scale and appropriate interventions in the flowsheet. Outcome: Progressing Towards Goal  Note: Pressure Injury Interventions:  Sensory Interventions: Assess need for specialty bed    Moisture Interventions: Absorbent underpads    Activity Interventions: Pressure redistribution bed/mattress(bed type)    Mobility Interventions: Float heels    Nutrition Interventions: Document food/fluid/supplement intake                     Problem: Falls - Risk of  Goal: *Absence of Falls  Description  Document Trent Fall Risk and appropriate interventions in the flowsheet.   Outcome: Progressing Towards Goal  Note: Fall Risk Interventions:  Mobility Interventions: Bed/chair exit alarm         Medication Interventions: Bed/chair exit alarm    Elimination Interventions: Call light in reach    History of Falls Interventions: Bed/chair exit alarm

## 2019-12-09 NOTE — PROGRESS NOTES
Internal Medicine Progress Note        NAME: Dragan Linares   :  10/20/1933  MRM:  [id number]    Date/Time: 2019        ASSESSMENT/PLAN:    Principal Problem:    Hypokalemia (2019)    Active Problems:    HTN (hypertension) (2014)      Pure hypercholesterolemia (2014)      DM (diabetes mellitus) ([facility] (2014)      Parkinson's disease ([facility] ()      Type 2 diabetes mellitus with diabetic neuropathy ([facility] (2018)      UTI (urinary tract infection) (2019)        #  Hypokalemia. Replete and trend. Mg level check .   magnesium low normal       #  DM. Provide SSI, hypoglycemia protocol and frequent Accu checks. Provide SSI, hypoglycemia protocol and frequent Accu checks. Education,  diabetic educator     # Maggie Lugo . Urine grew GNR . Treated with iv  Rocephin. f/u urine culture     # Diarrhea:  likely reason for hypoKalemia. check stool studies, C. Diff    # HTN. Control BP      #  DVT prophylaxis:  Lovenox      #multiple falls.    PT/OT consult for disposition     #  DM neuropathy - cont gabapentin    Lab Review:     Recent Labs     19  0516 19  0605 19  1110   WBC 8.0 11.3 16.1*   HGB 10.3* 11.4* 12.7*   HCT 32.6* 35.9* 39.2    251 241     Recent Labs     19  0516 19  0605 19  1110    141 139 140   K 3.1* 2.9* 2.8* 2.9*    103 103 103   CO2 28 32 30 32   GLU 97 105* 139* 132*   BUN 16 15 15 16   CREA 1.18 1.24 1.32* 1.44*   CA 8.3* 8.4* 8.6 8.8   MG 2.0  --  1.8  --    PHOS 3.1  --   --   --    ALB  --   --   --  3.2*   TBILI  --   --   --  0.7   SGOT  --   --   --  13   ALT  --   --   --  17     Lab Results   Component Value Date/Time    Glucose (POC) 96 2019 07:42 AM    Glucose (POC) 117 (H) 2019 10:11 PM    Glucose (POC) 123 (H) 2019 04:47 PM    Glucose (POC) 140 (H) 2019 11:30 AM    Glucose (POC) 119 (H) 2019 08:22 AM    Glucose (POC) 99 10/25/2017 10:02 AM    Glucose,  (H) 12/07/2019 04:17 PM     No results for input(s): PH, PCO2, PO2, HCO3, FIO2 in the last 72 hours. No results for input(s): INR, INREXT in the last 72 hours. No results found for: SDES  Lab Results   Component Value Date/Time    Culture result: >100,000 COLONIES/mL GRAM NEGATIVE RODS (A) 12/07/2019 02:27 PM    Culture result: (A) 12/07/2019 02:27 PM     >100,000 COLONIES/mL POSSIBLE 2ND GRAM NEGATIVE SANTHOSH    Culture result: >100,000  COLONIES/mL  MORGANELLA MORGANII   12/19/2016 12:17 AM    Culture result: >100,000  COLONIES/mL  PROVIDENCIA RETTGERI   12/19/2016 12:17 AM       All Cardiac Markers in the last 24 hours: No results found for: CPK, CK, CKMMB, CKMB, RCK3, CKMBT, CKNDX, CKND1, TAURUS, TROPT, TROIQ, QUE, TROPT, TNIPOC, BNP, BNPP           Subjective:     Chief Complaint:      Offer no complain     ROS:  (bold if positive,otherwise negative)    Fever/chills ,  Dysuria   Cough , Sputum , SOB/ZHANG , Chest Pain     Diarrhea ,Nausea/Vomit , Abd Pain , Constipation     Tolerating Diet                Objective:     Vitals:  Last 24hrs VS reviewed since prior progress note. Most recent are:    Visit Vitals  /71 (BP 1 Location: Left arm, BP Patient Position: At rest)   Pulse 65   Temp 98 °F (36.7 °C)   Resp 16   Ht 6' 1\" (1.854 m)   Wt 89.8 kg (198 lb)   SpO2 94%   BMI 26.12 kg/m²     SpO2 Readings from Last 6 Encounters:   12/09/19 94%   08/27/19 96%   04/07/19 100%   11/29/18 98%   10/11/18 98%   10/27/17 97%            Intake/Output Summary (Last 24 hours) at 12/9/2019 0840  Last data filed at 12/8/2019 2246  Gross per 24 hour   Intake 1190 ml   Output    Net 1190 ml          Physical Exam:   General appearance: alert, cooperative, no distress, appears stated age  Head: Normocephalic, without obvious abnormality, atraumatic  Neck: supple, trachea midline  Lungs: clear to auscultation bilaterally  Heart: regular rate and rhythm, S1, S2 normal, +  murmur, click, rub or gallop  Abdomen: soft, non-tender.  Bowel sounds normal. No masses,  no organomegaly  Extremities: extremities normal, atraumatic, no cyanosis or edema  Skin: Skin color, texture, turgor normal. No rashes or lesions  Neurologic:  tremors noted at times    Medications Reviewed: (see below)    Lab Data Reviewed: (see below)    ______________________________________________________________________    Medications:     Current Facility-Administered Medications   Medication Dose Route Frequency    potassium chloride SR (KLOR-CON 10) tablet 40 mEq  40 mEq Oral BID    aspirin chewable tablet 81 mg  81 mg Oral DAILY    carbidopa-levodopa (SINEMET)  mg per tablet 1 Tab - patient supplied (Patient Supplied)  1 Tab Oral TID    docusate sodium (COLACE) capsule 100 mg  100 mg Oral DAILY    finasteride (PROSCAR) tablet 5 mg  5 mg Oral DAILY    gabapentin (NEURONTIN) capsule 600 mg  600 mg Oral QHS    isosorbide mononitrate ER (IMDUR) tablet 60 mg  60 mg Oral QHS    metoprolol tartrate (LOPRESSOR) tablet 25 mg  25 mg Oral BID    lisinopril (PRINIVIL, ZESTRIL) tablet 40 mg  40 mg Oral DAILY    simvastatin (ZOCOR) tablet 20 mg  20 mg Oral QHS    tamsulosin (FLOMAX) capsule 0.4 mg  0.4 mg Oral PCD    acetaminophen (TYLENOL) tablet 650 mg  650 mg Oral Q4H PRN    ondansetron (ZOFRAN) injection 4 mg  4 mg IntraVENous Q4H PRN    enoxaparin (LOVENOX) injection 40 mg  40 mg SubCUTAneous Q24H    cefTRIAXone (ROCEPHIN) 1 g in 0.9% sodium chloride (MBP/ADV) 50 mL MBP  1 g IntraVENous Q24H          Total time spent with patient: 35 minutes                  Care Plan discussed with: Patient, Care Manager, Nursing Staff and >50% of time spent in counseling and coordination of care    Discussed:  Care Plan    Prophylaxis:  Lovenox                  Attending Physician: Effie Desouza MD

## 2019-12-09 NOTE — PROGRESS NOTES
EPAS Search    No previous assessments found for this member.     Gerardo Andrews RN    Outcomes Manager  [phone]  [phone]

## 2019-12-10 LAB
ANION GAP SERPL CALC-SCNC: 4 MMOL/L (ref 3–18)
BACTERIA SPEC CULT: ABNORMAL
BACTERIA SPEC CULT: ABNORMAL
BASOPHILS # BLD: 0 K/UL (ref 0–0.1)
BASOPHILS NFR BLD: 0 % (ref 0–2)
BUN SERPL-MCNC: 18 MG/DL (ref 7–18)
BUN/CREAT SERPL: 15 (ref 12–20)
CALCIUM SERPL-MCNC: 8.6 MG/DL (ref 8.5–10.1)
CHLORIDE SERPL-SCNC: 108 MMOL/L (ref 100–111)
CO2 SERPL-SCNC: 27 MMOL/L (ref 21–32)
CREAT SERPL-MCNC: 1.21 MG/DL (ref 0.6–1.3)
DIFFERENTIAL METHOD BLD: ABNORMAL
EOSINOPHIL # BLD: 0.3 K/UL (ref 0–0.4)
EOSINOPHIL NFR BLD: 4 % (ref 0–5)
ERYTHROCYTE [DISTWIDTH] IN BLOOD BY AUTOMATED COUNT: 15.6 % (ref 11.6–14.5)
GLUCOSE BLD STRIP.AUTO-MCNC: 100 MG/DL (ref 70–110)
GLUCOSE SERPL-MCNC: 90 MG/DL (ref 74–99)
HCT VFR BLD AUTO: 33.8 % (ref 36–48)
HGB BLD-MCNC: 10.7 G/DL (ref 13–16)
LYMPHOCYTES # BLD: 2.7 K/UL (ref 0.9–3.6)
LYMPHOCYTES NFR BLD: 32 % (ref 21–52)
MAGNESIUM SERPL-MCNC: 2 MG/DL (ref 1.6–2.6)
MCH RBC QN AUTO: 28.2 PG (ref 24–34)
MCHC RBC AUTO-ENTMCNC: 31.7 G/DL (ref 31–37)
MCV RBC AUTO: 89.2 FL (ref 74–97)
MONOCYTES # BLD: 0.6 K/UL (ref 0.05–1.2)
MONOCYTES NFR BLD: 7 % (ref 3–10)
NEUTS SEG # BLD: 4.8 K/UL (ref 1.8–8)
NEUTS SEG NFR BLD: 57 % (ref 40–73)
PHOSPHATE SERPL-MCNC: 2.8 MG/DL (ref 2.5–4.9)
PLATELET # BLD AUTO: 229 K/UL (ref 135–420)
PMV BLD AUTO: 10.3 FL (ref 9.2–11.8)
POTASSIUM SERPL-SCNC: 3.4 MMOL/L (ref 3.5–5.5)
RBC # BLD AUTO: 3.79 M/UL (ref 4.7–5.5)
SERVICE CMNT-IMP: ABNORMAL
SODIUM SERPL-SCNC: 139 MMOL/L (ref 136–145)
WBC # BLD AUTO: 8.5 K/UL (ref 4.6–13.2)

## 2019-12-10 PROCEDURE — 74011250637 HC RX REV CODE- 250/637: Performed by: INTERNAL MEDICINE

## 2019-12-10 PROCEDURE — 80048 BASIC METABOLIC PNL TOTAL CA: CPT

## 2019-12-10 PROCEDURE — 65660000000 HC RM CCU STEPDOWN

## 2019-12-10 PROCEDURE — 85025 COMPLETE CBC W/AUTO DIFF WBC: CPT

## 2019-12-10 PROCEDURE — 84100 ASSAY OF PHOSPHORUS: CPT

## 2019-12-10 PROCEDURE — 97535 SELF CARE MNGMENT TRAINING: CPT

## 2019-12-10 PROCEDURE — 36415 COLL VENOUS BLD VENIPUNCTURE: CPT

## 2019-12-10 PROCEDURE — 83735 ASSAY OF MAGNESIUM: CPT

## 2019-12-10 PROCEDURE — 74011250637 HC RX REV CODE- 250/637: Performed by: HOSPITALIST

## 2019-12-10 PROCEDURE — 82962 GLUCOSE BLOOD TEST: CPT

## 2019-12-10 PROCEDURE — 97530 THERAPEUTIC ACTIVITIES: CPT

## 2019-12-10 PROCEDURE — 74011000258 HC RX REV CODE- 258: Performed by: HOSPITALIST

## 2019-12-10 PROCEDURE — 74011250636 HC RX REV CODE- 250/636: Performed by: HOSPITALIST

## 2019-12-10 RX ORDER — CARBIDOPA AND LEVODOPA 50; 200 MG/1; MG/1
1 TABLET, EXTENDED RELEASE ORAL 3 TIMES DAILY
Qty: 1 TAB | Refills: 0 | Status: SHIPPED | OUTPATIENT
Start: 2019-12-10

## 2019-12-10 RX ORDER — LEVOFLOXACIN 750 MG/1
750 TABLET ORAL DAILY
Qty: 3 TAB | Refills: 0 | Status: SHIPPED | OUTPATIENT
Start: 2019-12-10 | End: 2019-12-12

## 2019-12-10 RX ORDER — UREA 10 %
2 LOTION (ML) TOPICAL 2 TIMES DAILY
Qty: 56 TAB | Refills: 0 | Status: SHIPPED | OUTPATIENT
Start: 2019-12-10 | End: 2019-12-24

## 2019-12-10 RX ADMIN — FINASTERIDE 5 MG: 5 TABLET, FILM COATED ORAL at 10:20

## 2019-12-10 RX ADMIN — METOPROLOL TARTRATE 25 MG: 25 TABLET ORAL at 10:20

## 2019-12-10 RX ADMIN — ASPIRIN 81 MG CHEWABLE TABLET 81 MG: 81 TABLET CHEWABLE at 10:19

## 2019-12-10 RX ADMIN — Medication 2 TABLET: at 17:32

## 2019-12-10 RX ADMIN — METOPROLOL TARTRATE 25 MG: 25 TABLET ORAL at 17:32

## 2019-12-10 RX ADMIN — SIMVASTATIN 20 MG: 20 TABLET, FILM COATED ORAL at 23:40

## 2019-12-10 RX ADMIN — GABAPENTIN 600 MG: 300 CAPSULE ORAL at 23:41

## 2019-12-10 RX ADMIN — ENOXAPARIN SODIUM 40 MG: 40 INJECTION, SOLUTION INTRAVENOUS; SUBCUTANEOUS at 23:42

## 2019-12-10 RX ADMIN — POTASSIUM CHLORIDE 40 MEQ: 10 TABLET, EXTENDED RELEASE ORAL at 10:20

## 2019-12-10 RX ADMIN — ISOSORBIDE MONONITRATE 60 MG: 60 TABLET, EXTENDED RELEASE ORAL at 23:41

## 2019-12-10 RX ADMIN — DOCUSATE SODIUM 100 MG: 100 CAPSULE, LIQUID FILLED ORAL at 10:19

## 2019-12-10 RX ADMIN — CARBIDOPA AND LEVODOPA 1 TABLET: 50; 200 TABLET, EXTENDED RELEASE ORAL at 17:30

## 2019-12-10 RX ADMIN — CARBIDOPA AND LEVODOPA 1 TABLET: 50; 200 TABLET, EXTENDED RELEASE ORAL at 10:20

## 2019-12-10 RX ADMIN — CEFTRIAXONE 1 G: 1 INJECTION, POWDER, FOR SOLUTION INTRAMUSCULAR; INTRAVENOUS at 17:31

## 2019-12-10 RX ADMIN — Medication 2 TABLET: at 10:20

## 2019-12-10 RX ADMIN — CARBIDOPA AND LEVODOPA 1 TABLET: 50; 200 TABLET, EXTENDED RELEASE ORAL at 23:39

## 2019-12-10 RX ADMIN — LISINOPRIL 40 MG: 20 TABLET ORAL at 10:19

## 2019-12-10 RX ADMIN — TAMSULOSIN HYDROCHLORIDE 0.4 MG: 0.4 CAPSULE ORAL at 17:32

## 2019-12-10 NOTE — PROGRESS NOTES
Problem: Mobility Impaired (Adult and Pediatric)  Goal: *Acute Goals and Plan of Care (Insert Text)  Description  Physical Therapy Goals  Initiated 12/8/2019 and to be accomplished within 7 day(s)  1. Patient will move from supine to sit and sit to supine in bed with supervision/set-up in order to facilitate eating meals in sitting. 2.  Patient will transfer from bed to chair and chair to bed with supervision/set-up using the least restrictive device in order to facilitate participation in sitting ADLs. 3.  Patient will perform sit to stand with supervision/set-up in order to prepare for standing ADLs. 4.  Patient will ambulate with minimal assistance/contact guard assist for >/=65 feet with the least restrictive device in order to facilitate improved ease with ambulation to the restroom. Outcome: Progressing Towards Goal   PHYSICAL THERAPY TREATMENT    Patient: Mayra Mercedes (12 y.o. male)  Date: 12/10/2019  Diagnosis: Hypokalemia [E87.6]  Hypokalemia [E87.6]   Hypokalemia       Precautions: Fall, Other (comment)([employer])  PLOF: with rollator for home distances, power wheelchair in community    ASSESSMENT:  Pt very pleasant, CGA sup>sit with good static and dynamic seated balance. Min a X2 sit>stand with verbal cues for hand placement and posture correction, pt with flexed posture, able to correct somewhat with verbal and tactile cues. Slow but steady gait bed>chair and good eccentric control stand>sit. Progression toward goals:   [x]      Improving appropriately and progressing toward goals  []      Improving slowly and progressing toward goals  []      Not making progress toward goals and plan of care will be adjusted     PLAN:  Patient continues to benefit from skilled intervention to address the above impairments. Continue treatment per established plan of care.   Discharge Recommendations:  Skilled Nursing Facility  Further Equipment Recommendations for Discharge:  N/A     SUBJECTIVE: Patient stated Alex Coppola will go to rehab for 1 week.     OBJECTIVE DATA SUMMARY:   Critical Behavior:  Neurologic State: Alert  Orientation Level: Oriented to person, Oriented to place  Cognition: Follows commands  Safety/Judgement: Fall prevention  Functional Mobility Training:  Bed Mobility:  Supine to Sit: Contact guard assistance  Transfers:  Sit to Stand: Minimum assistance;Assist x2  Stand to Sit: Contact guard assistance;Assist x2  Balance:  Sitting: Intact  Standing: Impaired  Standing - Static: Good;Constant support  Standing - Dynamic : Fair;Constant support     Ambulation/Gait Training:  Distance (ft): 4 Feet (ft)  Assistive Device: Walker, rolling  Ambulation - Level of Assistance: Contact guard assistance   Base of Support: Narrowed  Speed/Ximena: Shuffled; Slow  Step Length: Left shortened;Right shortened  Pain:  Pain level pre-treatment: 0/10  Pain level post-treatment: 0/10   Pain Intervention(s): n/a      Activity Tolerance:   Good  Please refer to the flowsheet for vital signs taken during this treatment. After treatment:   [x] Patient left in no apparent distress sitting up in chair  [] Patient left in no apparent distress in bed  [x] Call bell left within reach  [] Nursing notified  [x] Caregiver present  [] Bed alarm activated  [] SCDs applied      COMMUNICATION/EDUCATION:   []           []         Fall prevention education was provided and the patient/caregiver indicated understanding. []         Patient/family have participated as able in working toward goals and plan of care. []         Patient/family agree to work toward stated goals and plan of care. []         Patient understands intent and goals of therapy, but is neutral about his/her participation. []         Patient is unable to participate in stated goals/plan of care: ongoing with therapy staff. [x]         Role of Physical Therapy in the acute care setting.         Payton Aguilar PTA   Time Calculation: 15 mins

## 2019-12-10 NOTE — PROGRESS NOTES
Assumed care from [facility], RN. Patient is awake and alert, denies any pain or discomfort. HOB elevated, bed locked and in low position. Call bell within reach. Will continue to monitor. C-diff with negative result. 0000> No distress noted. 0400> Patient remains stable. Will continue to monitor. 0710> Bedside and Verbal shift change report given to Syeda Morales (oncoming nurse) by Melvina Mendoza RN (offgoing nurse). Report included the following information SBAR, Kardex, Intake/Output, MAR and Recent Results.

## 2019-12-10 NOTE — PROGRESS NOTES
Problem: Self Care Deficits Care Plan (Adult)  Goal: *Acute Goals and Plan of Care (Insert Text)  Description  Occupational Therapy Goals  Initiated 12/9/2019 within 7 day(s). 1.  Patient will perform upper body dressing and bathing with modified independence. 2.  Patient will perform lower body dressing and bathing with supervision/set-up. 3.  Patient will perform toilet transfers with modified independence. 4.  Patient will perform all aspects of toileting with modified independence. 5.  Patient will participate in upper extremity therapeutic exercise/activities with independence for 8 minutes. Prior Level of Function:  Patient receiving HH OT/PT and HHA services (2X/wk). Outcome: Progressing Towards Goal   OCCUPATIONAL THERAPY TREATMENT    Patient: Jinny Allen (89 y.o. male)  Date: 12/10/2019  Diagnosis: Hypokalemia [E87.6]  Hypokalemia [E87.6]   Hypokalemia       Precautions: Fall, Other (comment)([employer])    Chart, occupational therapy assessment, plan of care, and goals were reviewed. ASSESSMENT:  Pt sitting up in chair with friend and RN present providing medications. Pt noted to cough excessively after attempts at swallowing pills with RN reporting this is \"normal\" for pt. Setup grooming items at bedside table placed across room; pt required Min A for STS with moderate cues for scooting to edge of chair. Friend in room attempting to assist therapist, directed friend that pt does not need extra assist. Min A provided once pt standing to RW with flexed posture and min mobility to bedside table. Pt able to tolerate standing at bedside table approx 4 mins for oral care; noted pt with incontinence of clear BM on floor while standing. Extra time for grooming needed due to weakness. Pt safely returned to bedside chair to change socks (unable/did not attempt to bend forward to assist) and gown.    He then stood from chair, again requiring cues and Min A to complete BM hygiene/change chux pad with CGA once standing. Hand hygiene completed seated in chair. Pt made comfortable with all needs in reach at end of session. Progression toward goals:  [x]          Improving appropriately and progressing toward goals  []          Improving slowly and progressing toward goals  []          Not making progress toward goals and plan of care will be adjusted     PLAN:  Patient continues to benefit from skilled intervention to address the above impairments. Continue treatment per established plan of care. Discharge Recommendations:  Rehab vs Home Health with 24/7 Assistance   Further Equipment Recommendations for Discharge:  3:1 commode      SUBJECTIVE:   Patient stated Its harder for me to do these things; not like y'all. I am 80years old.     OBJECTIVE DATA SUMMARY:   Cognitive/Behavioral Status:  Neurologic State: Alert  Orientation Level: Oriented to person, Oriented to place, Oriented to situation  Cognition: Follows commands  Safety/Judgement: Fall prevention    Functional Mobility and Transfers for ADLs:  Bed Mobility:  Supine to Sit: Contact guard assistance     Transfers:  Sit to Stand: Minimum assistance; Additional time  Stand to Sit: Minimum assistance; Additional time  Toilet Transfer : Minimum assistance(simulated to bedside chair )  Bathroom Mobility: Minimum assistance(simulated in room )      Balance:  Sitting: Intact  Standing: Impaired; With support  Standing - Static: Good;Constant support  Standing - Dynamic : Fair;Constant support    ADL Intervention:  Feeding  Feeding Assistance: Supervision  Container Management: Supervision  Drink to Mouth: Supervision    Grooming  Grooming Assistance: Contact guard assistance  Position Performed: Standing  Washing Hands: Set-up  Brushing Teeth: Contact guard assistance  Cues: Verbal cues provided  Adaptive Equipment: Other (comment)(RW )    Upper Body Dressing Assistance   Dressing Assistance: [address]: setup    Lower Body Dressing Assistance  Dressing Assistance: Total assistance(dependent)  Socks: Total assistance (dependent)(doff/maci )  Position Performed: Seated in chair    Toileting  Toileting Assistance: Contact guard assistance  Bowel Hygiene: Contact guard assistance  Clothing Management: Contact guard assistance  Adaptive Equipment: Walker    Cognitive Retraining  Safety/Judgement: Fall prevention    Pain:  Pain level pre-treatment: 0/10   Pain level post-treatment: 0/10  Pain Intervention(s): Medication (see MAR); Rest, Repositioning   Response to intervention: Nurse notified, See doc flow    Activity Tolerance:    Fair   Please refer to the flowsheet for vital signs taken during this treatment. After treatment:   [x]  Patient left in no apparent distress sitting up in chair  []  Patient left in no apparent distress in bed  [x]  Call bell left within reach  []  Nursing notified  [x]  Friend present  []  Bed alarm activated    COMMUNICATION/EDUCATION:   [x] Role of Occupational Therapy in the acute care setting  [x] Home safety education was provided and the patient/caregiver indicated understanding. [x] Patient/family have participated as able in working towards goals and plan of care. [] Patient/family agree to work toward stated goals and plan of care. [] Patient understands intent and goals of therapy, but is neutral about his/her participation. [] Patient is unable to participate in goal setting and plan of care.       Thank you for this referral.  CHERY Edward   Time Calculation: 31 mins

## 2019-12-10 NOTE — DISCHARGE SUMMARY
Discharge Summary      [facility] service    Patient ID:  Dwight Tanner, 80 y.o., male  : 10/20/1933    Admit Date: 2019  Discharge Date:  12/10/2019  Length of stay: 2 day(s)    PCP:  Reina Perez MD    Chief Complaint   Patient presents with    Fall     Discharge Diagnosis:     Hospital Problems  Date Reviewed: 2019          Codes Class Noted POA    UTI (urinary tract infection) ICD-10-CM: N39.0  ICD-9-CM: 599.0  2019 Yes        * (Principal) Hypokalemia ICD-10-CM: E87.6  ICD-9-CM: 276.8  2019 Yes        Type 2 diabetes mellitus with diabetic neuropathy ([facility] ICD-10-CM: E11.40  ICD-9-CM: 250.60, 357.2  2018 Yes        Parkinson's disease ([facility] ICD-10-CM: G20  ICD-9-CM: 332.0  Unknown Yes        HTN (hypertension) ICD-10-CM: I10  ICD-9-CM: 401.9  2014 Yes        Pure hypercholesterolemia ICD-10-CM: E78.00  ICD-9-CM: 272.0  2014 Yes        DM (diabetes mellitus) ([facility] ICD-10-CM: E11.9  ICD-9-CM: 250.00  2014 Yes             Discharge instructions:    #  Discharge Diet:            Diet: Resume previous diet  # Discharge activity and restrictions: Activity as tolerated and PT/OT Eval and Treat    # Follow-up appointments: Follow-up Information     Follow up With Specialties Details Why Contact Info    Reina Perez MD Internal Medicine In 1 week  [address]      Reina Perez MD Internal Medicine   [facility]  [address]  [address]  [phone]              HPI on Admission (per admitting physician):  Dwight Tanner is a 80y.o. year old male who presents with  Generalized weakness. This gentleman has parkinsons disease and family noted more weakness if ambulation /movement today. Patient is found in ed to have low potassium and a UTI. For further details and initial management please refer to H/P. Hospital Course:      Patient feel good , eager to go home.  Decline to consider rehab if needed. Offer no complain. By Problems :      #  Hypokalemia. Replete and trend. Mg level checked . magnesium low normal  . He is on home maintenance per his PCP. Follow      #  DM. Provide SSI, hypoglycemia protocol and frequent Accu checks. Provide SSI, hypoglycemia protocol and frequent Accu checks. Education,  diabetic educator      #  UTI . Urine grew GNR . Treated with iv  Rocephin.   f/u urine culture: Grew  >100,000 COLONIES/mL KLEBSIELLA PNEUMONIAE  Pan sensitive except ampicillin. He had 2 doses of iv CFTX, will dc on po LVQ per data.      # Diarrhea:  resolved per patient , no current diarrhea. negative  C. Diff test     # HTN. Control BP        #multiple falls. Possible due to neuropathy , may be other reasons like arthritic changes, vision decline with age,,, etc. Seen by   PT/OT consult for disposition, declined rehab if needed  and going home     #  DM neuropathy - cont gabapentin    Discharge condition:  improved and stable    Disposition:  Home    Procedures:   * No surgery found *      Consultants: None    Physical Exam on Discharge:  Visit Vitals  /71 (BP 1 Location: Right arm, BP Patient Position: Head of bed elevated (Comment degrees))   Pulse 65   Temp 97.6 °F (36.4 °C)   Resp 16   Ht 6' 1\" (1.854 m)   Wt 85.7 kg (189 lb)   SpO2 96%   BMI 24.94 kg/m²   General appearance: alert, cooperative, no distress, appears stated age  Head: Normocephalic, without obvious abnormality, atraumatic  Neck: supple, trachea midline  Lungs: clear to auscultation bilaterally  Heart: regular rate and rhythm, S1, S2 normal, +  murmur, click, rub or gallop  Abdomen: soft, non-tender.  Bowel sounds normal. No masses,  no organomegaly  Extremities: extremities normal, atraumatic, no cyanosis or edema  Skin: Skin color, texture, turgor normal. No rashes or lesions  Neurologic:  tremors noted at times    Hospitalization and discharge instructions d/c in details with : patient , family , Nursing/CM Discharge medications :  Current Discharge Medication List      START taking these medications    Details   carbidopa-levodopa ER (SINEMET CR)  mg per tablet Take 1 Tab by mouth three (3) times daily. Qty: 1 Tab, Refills: 0      Lactobacillus Acidoph & Bulgar (FLORANEX) 1 million cell tab tablet Take 2 Tabs by mouth two (2) times a day for 14 days. Qty: 56 Tab, Refills: 0      levoFLOXacin (LEVAQUIN) 750 mg tablet Take 1 Tab by mouth daily for 3 days. Qty: 3 Tab, Refills: 0         CONTINUE these medications which have NOT CHANGED    Details   pentoxifylline CR (TRENTAL) 400 mg CR tablet Take 400 mg by mouth daily. 1 tab daily      loperamide (IMODIUM A-D) 2 mg tablet Take 2 mg by mouth daily. Indications: diarrhea      finasteride (PROSCAR) 5 mg tablet Take 5 mg by mouth daily. potassium chloride SR (KLOR-CON 10) 10 mEq tablet Take  by mouth. glycopyrrolate (ROBINUL) 1 mg tablet Take 1 mg by mouth two (2) times a day. parkinson       isosorbide mononitrate ER (IMDUR) 60 mg CR tablet Take 1 Tab by mouth daily. Qty: 30 Tab, Refills: 6      ramipril (ALTACE) 10 mg capsule Take 1 Cap by mouth daily. Qty: 30 Cap, Refills: 0      sitaGLIPtin (JANUVIA) 50 mg tablet Take 1 Tab by mouth daily. Qty: 30 Tab, Refills: 0      gabapentin (NEURONTIN) 600 mg tablet Take 800 mg by mouth two (2) times a day. simvastatin (ZOCOR) 20 mg tablet Take 20 mg by mouth nightly. tamsulosin (FLOMAX) 0.4 mg capsule Take 1 capsule by mouth daily (after dinner). Qty: 90 capsule, Refills: 3    Associated Diagnoses: Urinary retention; BPH (benign prostatic hyperplasia)      metoprolol (LOPRESSOR) 25 mg tablet Take 1 Tab by mouth two (2) times a day. Qty: 60 Tab, Refills: 11      aspirin 81 mg chewable tablet Take 1 Tab by mouth daily. Qty: 90 Tab, Refills: 3      nitroglycerin (NITROSTAT) 0.4 mg SL tablet 1 Tab by SubLINGual route as needed for Chest Pain.   Qty: 1 Bottle, Refills: 3      docusate sodium (COLACE) 100 mg capsule Take 100 mg by mouth daily. STOP taking these medications       carbidopa-levodopa (SINEMET-25/250)  mg per tablet Comments:   Reason for Stopping:                   Most Recent Labs:       Recent Labs     12/10/19  0358 12/09/19  0516 12/08/19  0605   WBC 8.5 8.0 11.3   HGB 10.7* 10.3* 11.4*   HCT 33.8* 32.6* 35.9*    218 251     Recent Labs     12/10/19  0358 12/09/19  0516 12/08/19  0605 12/07/19  1955 12/07/19  1110    140 141 139 140   K 3.4* 3.1* 2.9* 2.8* 2.9*    108 103 103 103   CO2 27 28 32 30 32   GLU 90 97 105* 139* 132*   BUN 18 16 15 15 16   CREA 1.21 1.18 1.24 1.32* 1.44*   CA 8.6 8.3* 8.4* 8.6 8.8   MG 2.0 2.0  --  1.8  --    PHOS 2.8 3.1  --   --   --    ALB  --   --   --   --  3.2*   TBILI  --   --   --   --  0.7   SGOT  --   --   --   --  13   ALT  --   --   --   --  17     Lab Results   Component Value Date/Time    Glucose (POC) 114 (H) 12/09/2019 04:27 PM    Glucose (POC) 110 12/09/2019 11:48 AM    Glucose (POC) 96 12/09/2019 07:42 AM    Glucose (POC) 117 (H) 12/08/2019 10:11 PM    Glucose (POC) 123 (H) 12/08/2019 04:47 PM    Glucose (POC) 99 10/25/2017 10:02 AM    Glucose,  (H) 12/07/2019 04:17 PM     No results for input(s): PH, PCO2, PO2, HCO3, FIO2 in the last 72 hours. No results for input(s): INR, INREXT in the last 72 hours.     No results found for: SDES  Lab Results   Component Value Date/Time    Culture result: >100,000 COLONIES/mL KLEBSIELLA PNEUMONIAE (A) 12/07/2019 02:27 PM    Culture result: (A) 12/07/2019 02:27 PM     >100,000 COLONIES/mL KLEBSIELLA PNEUMONIAE 2ND MORPHOTYPE    Culture result: >100,000  COLONIES/mL  MORGANELLA MORGANII   12/19/2016 12:17 AM    Culture result: >100,000  COLONIES/mL  PROVIDENCIA RETTGERI   12/19/2016 12:17 AM       All Cardiac Markers in the last 24 hours: No results found for: CPK, CK, CKMMB, CKMB, RCK3, CKMBT, CKNDX, CKND1, TAURUS, TROPT, TROIQ, QUE, TROPT, TNIPOC, BNP, BNPP    XR Results:  Results from [place] encounter on 12/07/19   XR ANKLE RT MIN 3 V    Narrative EXAM: RIGHT ANKLE RADIOGRAPHS    CLINICAL INDICATION/HISTORY: trauma  -Additional: Right ankle injury with pain. COMPARISON: None    TECHNIQUE: 3 views of the right ankle.    _______________    FINDINGS:    BONES: No evidence of acute fracture. Chronic posttraumatic deformity of tibia  shaft. Significant degenerative disease of the ankle joint with prominent  anterior osteophytosis. 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 SOFT TISSUES: Soft tissue and vascular calcifications. There does appear to be  some edema around the ankle joint.    _______________      Impression IMPRESSION:    No evidence of acute fracture. Prominent degenerative changes of the ankle. CT Results:  Results from Hospital Encounter encounter on 12/07/19   CT PELV WO CONT    Narrative EXAM: CT PELVIS    CLINICAL INDICATION/HISTORY: Fall with pelvic injury and pain.    > Additional: None. COMPARISON: 12/21/2015    TECHNIQUE: Helical CT acquisition of the pelvis without contrast. Coronal and  sagittal reformats were generated and reviewed. One or more dose reduction  techniques were used on this CT: automated exposure control, adjustment of the  mAs and/or kVp according to patient size, and iterative reconstruction  techniques. The specific techniques used on this CT exam have been documented  in the patient's electronic medical record. Digital Imaging and Communications  in Medicine (DICOM) format image data are available to nonaffiliated external  healthcare facilities or entities on a secure, media free, reciprocally  searchable basis with patient authorization for at least a 12-month period after  this study. _______________    FINDINGS:    OSSEOUS:      > Generalized osseous demineralization.     > No displaced fracture identified. Normal alignment.     > Mild-moderate joint space loss of both hips.  Mild degenerative changes of  pubic symphysis and SI joints.     > Mild degenerative disc changes and moderate facet arthropathy in the lower  lumbar spine. SOFT TISSUES:      > No regional soft tissue edema or hematoma identified. > Prominent stool burden in the rectum.     > Bladder wall thickening. Small amount of gas in the bladder, as well as  stippled foci of gas along the inner surface of the bladder wall.    _______________      Impression IMPRESSION:    1. No evidence of fracture. Generalized osseous demineralization. 2. Bladder wall thickening, with foci of gas in the bladder wall. 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 3. Prominent stool burden in the rectum noted. MRI Results:  No results found for this or any previous visit. Nuclear Medicine Results:  No results found for this or any previous visit. US Results:  No results found for this or any previous visit. IR Results:  No results found for this or any previous visit. VAS/US Results:  Results from Abstract encounter on 07/25/14   LOWER EXT ART PVR MULT LEVEL SEG PRESSURES       Total discharge time 33 minutes. Nancy Tam MD  Hospitalist  [facility], [facility]. medical group. Hospitalist Division.   December 10, 2019  9:54 AM

## 2019-12-10 NOTE — PROGRESS NOTES
Problem: Diabetes Self-Management  Goal: *Disease process and treatment process  Description  Define diabetes and identify own type of diabetes; list 3 options for treating diabetes. Outcome: Progressing Towards Goal  Goal: *Incorporating nutritional management into lifestyle  Description  Describe effect of type, amount and timing of food on blood glucose; list 3 methods for planning meals. Outcome: Progressing Towards Goal  Goal: *Incorporating physical activity into lifestyle  Description  State effect of exercise on blood glucose levels. Outcome: Progressing Towards Goal  Goal: *Developing strategies to promote health/change behavior  Description  Define the ABC's of diabetes; identify appropriate screenings, schedule and personal plan for screenings. Outcome: Progressing Towards Goal  Goal: *Using medications safely  Description  State effect of diabetes medications on diabetes; name diabetes medication taking, action and side effects. Outcome: Progressing Towards Goal  Goal: *Monitoring blood glucose, interpreting and using results  Description  Identify recommended blood glucose targets  and personal targets. Outcome: Progressing Towards Goal  Goal: *Prevention, detection, treatment of acute complications  Description  List symptoms of hyper- and hypoglycemia; describe how to treat low blood sugar and actions for lowering  high blood glucose level. Outcome: Progressing Towards Goal  Goal: *Prevention, detection and treatment of chronic complications  Description  Define the natural course of diabetes and describe the relationship of blood glucose levels to long term complications of diabetes.   Outcome: Progressing Towards Goal  Goal: *Developing strategies to address psychosocial issues  Description  Describe feelings about living with diabetes; identify support needed and support network  Outcome: Progressing Towards Goal  Goal: *Insulin pump training  Outcome: Progressing Towards Goal  Goal: *Sick day guidelines  Outcome: Progressing Towards Goal  Goal: *Patient Specific Goal (EDIT GOAL, INSERT TEXT)  Outcome: Progressing Towards Goal     Problem: Patient Education: Go to Patient Education Activity  Goal: Patient/Family Education  Outcome: Progressing Towards Goal     Problem: Pressure Injury - Risk of  Goal: *Prevention of pressure injury  Description  Document Stefan Scale and appropriate interventions in the flowsheet. Outcome: Progressing Towards Goal  Note: Pressure Injury Interventions:  Sensory Interventions: Assess need for specialty bed    Moisture Interventions: Absorbent underpads    Activity Interventions: Pressure redistribution bed/mattress(bed type)    Mobility Interventions: Float heels    Nutrition Interventions: Document food/fluid/supplement intake                     Problem: Patient Education: Go to Patient Education Activity  Goal: Patient/Family Education  Outcome: Progressing Towards Goal     Problem: Falls - Risk of  Goal: *Absence of Falls  Description  Document Anila Brody Fall Risk and appropriate interventions in the flowsheet. Outcome: Progressing Towards Goal  Note: Fall Risk Interventions:  Mobility Interventions: Bed/chair exit alarm         Medication Interventions: Bed/chair exit alarm    Elimination Interventions: Call light in reach    History of Falls Interventions: Bed/chair exit alarm         Problem: Patient Education: Go to Patient Education Activity  Goal: Patient/Family Education  Outcome: Progressing Towards Goal     Problem: Risk for Spread of Infection  Goal: Prevent transmission of infectious organism to others  Description  Prevent the transmission of infectious organisms to other patients, staff members, and visitors.   Outcome: Progressing Towards Goal     Problem: Patient Education:  Go to Education Activity  Goal: Patient/Family Education  Outcome: Progressing Towards Goal     Problem: Patient Education: Go to Patient Education Activity  Goal: Patient/Family Education  Outcome: Progressing Towards Goal     Problem: Patient Education: Go to Patient Education Activity  Goal: Patient/Family Education  Outcome: Progressing Towards Goal

## 2019-12-10 NOTE — PROGRESS NOTES
Chart reviewed. Informed by staff that pt's family would like SNF for rehab. Met with pt/family to discuss SNF option, pt began crying, explained to him that it will be short term only, 1-2 weeks, he calmed down, agreeable to SNF. Obtained choices & posted in [employer]. Will cont to follow. Tricia Queen RN,ext [phone].

## 2019-12-10 NOTE — ROUTINE PROCESS
Received verbal bedside report from [facility], [facility]. Pt awake, alert and oriented x 4, denies pain, family at the bedside. No signs of distress noted at this time. Call bell within reach, bed in the lowest locked position.

## 2019-12-10 NOTE — DISCHARGE INSTRUCTIONS
DISCHARGE SUMMARY from Nurse    PATIENT INSTRUCTIONS:    After general anesthesia or intravenous sedation, for 24 hours or while taking prescription Narcotics:  · Limit your activities  · Do not drive and operate hazardous machinery  · Do not make important personal or business decisions  · Do  not drink alcoholic beverages  · If you have not urinated within 8 hours after discharge, please contact your surgeon on call. Report the following to your surgeon:  · Excessive pain, swelling, redness or odor of or around the surgical area  · Temperature over 100.5  · Nausea and vomiting lasting longer than 4 hours or if unable to take medications  · Any signs of decreased circulation or nerve impairment to extremity: change in color, persistent  numbness, tingling, coldness or increase pain  · Any questions    What to do at Home:  Recommended activity: Activity as tolerated and PT/OT per Home Health    If you experience any of the following symptoms fever, chills, or change in urine odor/color, please follow up with primary care physician. *  Please give a list of your current medications to your Primary Care Provider. *  Please update this list whenever your medications are discontinued, doses are      changed, or new medications (including over-the-counter products) are added. *  Please carry medication information at all times in case of emergency situations. These are general instructions for a healthy lifestyle:    No smoking/ No tobacco products/ Avoid exposure to second hand smoke  Surgeon General's Warning:  Quitting smoking now greatly reduces serious risk to your health.     Obesity, smoking, and sedentary lifestyle greatly increases your risk for illness    A healthy diet, regular physical exercise & weight monitoring are important for maintaining a healthy lifestyle    You may be retaining fluid if you have a history of heart failure or if you experience any of the following symptoms:  Weight gain of 3 pounds or more overnight or 5 pounds in a week, increased swelling in our hands or feet or shortness of breath while lying flat in bed. Please call your doctor as soon as you notice any of these symptoms; do not wait until your next office visit. The discharge information has been reviewed with the patient and caregiver. The patient and caregiver verbalized understanding. Discharge medications reviewed with the patient and caregiver and appropriate educational materials and side effects teaching were provided. Patient armband removed and shredded.

## 2019-12-11 LAB
ANION GAP SERPL CALC-SCNC: 7 MMOL/L (ref 3–18)
BACTERIA SPEC CULT: NORMAL
BACTERIA SPEC CULT: NORMAL
BASOPHILS # BLD: 0 K/UL (ref 0–0.1)
BASOPHILS NFR BLD: 0 % (ref 0–2)
BUN SERPL-MCNC: 19 MG/DL (ref 7–18)
BUN/CREAT SERPL: 17 (ref 12–20)
CALCIUM SERPL-MCNC: 8.4 MG/DL (ref 8.5–10.1)
CHLORIDE SERPL-SCNC: 106 MMOL/L (ref 100–111)
CO2 SERPL-SCNC: 26 MMOL/L (ref 21–32)
CREAT SERPL-MCNC: 1.09 MG/DL (ref 0.6–1.3)
DIFFERENTIAL METHOD BLD: ABNORMAL
EOSINOPHIL # BLD: 0.2 K/UL (ref 0–0.4)
EOSINOPHIL NFR BLD: 2 % (ref 0–5)
ERYTHROCYTE [DISTWIDTH] IN BLOOD BY AUTOMATED COUNT: 15.1 % (ref 11.6–14.5)
GLUCOSE BLD STRIP.AUTO-MCNC: 107 MG/DL (ref 70–110)
GLUCOSE BLD STRIP.AUTO-MCNC: 134 MG/DL (ref 70–110)
GLUCOSE BLD STRIP.AUTO-MCNC: 140 MG/DL (ref 70–110)
GLUCOSE SERPL-MCNC: 101 MG/DL (ref 74–99)
HCT VFR BLD AUTO: 35.5 % (ref 36–48)
HGB BLD-MCNC: 11.4 G/DL (ref 13–16)
LYMPHOCYTES # BLD: 2.5 K/UL (ref 0.9–3.6)
LYMPHOCYTES NFR BLD: 28 % (ref 21–52)
MAGNESIUM SERPL-MCNC: 1.9 MG/DL (ref 1.6–2.6)
MCH RBC QN AUTO: 28.2 PG (ref 24–34)
MCHC RBC AUTO-ENTMCNC: 32.1 G/DL (ref 31–37)
MCV RBC AUTO: 87.9 FL (ref 74–97)
MONOCYTES # BLD: 0.7 K/UL (ref 0.05–1.2)
MONOCYTES NFR BLD: 8 % (ref 3–10)
NEUTS SEG # BLD: 5.6 K/UL (ref 1.8–8)
NEUTS SEG NFR BLD: 62 % (ref 40–73)
PHOSPHATE SERPL-MCNC: 3.4 MG/DL (ref 2.5–4.9)
PLATELET # BLD AUTO: 238 K/UL (ref 135–420)
PMV BLD AUTO: 9.8 FL (ref 9.2–11.8)
POTASSIUM SERPL-SCNC: 3.2 MMOL/L (ref 3.5–5.5)
RBC # BLD AUTO: 4.04 M/UL (ref 4.7–5.5)
SERVICE CMNT-IMP: NORMAL
SODIUM SERPL-SCNC: 139 MMOL/L (ref 136–145)
WBC # BLD AUTO: 9 K/UL (ref 4.6–13.2)

## 2019-12-11 PROCEDURE — 97535 SELF CARE MNGMENT TRAINING: CPT

## 2019-12-11 PROCEDURE — 84100 ASSAY OF PHOSPHORUS: CPT

## 2019-12-11 PROCEDURE — 80048 BASIC METABOLIC PNL TOTAL CA: CPT

## 2019-12-11 PROCEDURE — 74011250636 HC RX REV CODE- 250/636: Performed by: HOSPITALIST

## 2019-12-11 PROCEDURE — 85025 COMPLETE CBC W/AUTO DIFF WBC: CPT

## 2019-12-11 PROCEDURE — 74011000258 HC RX REV CODE- 258: Performed by: HOSPITALIST

## 2019-12-11 PROCEDURE — 74011250637 HC RX REV CODE- 250/637: Performed by: INTERNAL MEDICINE

## 2019-12-11 PROCEDURE — 65660000000 HC RM CCU STEPDOWN

## 2019-12-11 PROCEDURE — 36415 COLL VENOUS BLD VENIPUNCTURE: CPT

## 2019-12-11 PROCEDURE — 83735 ASSAY OF MAGNESIUM: CPT

## 2019-12-11 PROCEDURE — 74011250637 HC RX REV CODE- 250/637: Performed by: HOSPITALIST

## 2019-12-11 PROCEDURE — 82962 GLUCOSE BLOOD TEST: CPT

## 2019-12-11 RX ORDER — POTASSIUM CHLORIDE 750 MG/1
40 TABLET, FILM COATED, EXTENDED RELEASE ORAL
Status: DISCONTINUED | OUTPATIENT
Start: 2019-12-11 | End: 2019-12-11

## 2019-12-11 RX ORDER — POTASSIUM CHLORIDE 20 MEQ/1
40 TABLET, EXTENDED RELEASE ORAL
Status: COMPLETED | OUTPATIENT
Start: 2019-12-11 | End: 2019-12-11

## 2019-12-11 RX ADMIN — METOPROLOL TARTRATE 25 MG: 25 TABLET ORAL at 08:13

## 2019-12-11 RX ADMIN — ENOXAPARIN SODIUM 40 MG: 40 INJECTION, SOLUTION INTRAVENOUS; SUBCUTANEOUS at 22:39

## 2019-12-11 RX ADMIN — FINASTERIDE 5 MG: 5 TABLET, FILM COATED ORAL at 10:00

## 2019-12-11 RX ADMIN — GABAPENTIN 600 MG: 300 CAPSULE ORAL at 22:39

## 2019-12-11 RX ADMIN — DOCUSATE SODIUM 100 MG: 100 CAPSULE, LIQUID FILLED ORAL at 08:13

## 2019-12-11 RX ADMIN — POTASSIUM CHLORIDE 40 MEQ: 1500 TABLET, EXTENDED RELEASE ORAL at 10:55

## 2019-12-11 RX ADMIN — CARBIDOPA AND LEVODOPA 1 TABLET: 50; 200 TABLET, EXTENDED RELEASE ORAL at 16:28

## 2019-12-11 RX ADMIN — CARBIDOPA AND LEVODOPA 1 TABLET: 50; 200 TABLET, EXTENDED RELEASE ORAL at 09:59

## 2019-12-11 RX ADMIN — ASPIRIN 81 MG CHEWABLE TABLET 81 MG: 81 TABLET CHEWABLE at 08:13

## 2019-12-11 RX ADMIN — CARBIDOPA AND LEVODOPA 1 TABLET: 50; 200 TABLET, EXTENDED RELEASE ORAL at 22:49

## 2019-12-11 RX ADMIN — CEFTRIAXONE 1 G: 1 INJECTION, POWDER, FOR SOLUTION INTRAMUSCULAR; INTRAVENOUS at 16:28

## 2019-12-11 RX ADMIN — METOPROLOL TARTRATE 25 MG: 25 TABLET ORAL at 18:19

## 2019-12-11 RX ADMIN — Medication 2 TABLET: at 18:19

## 2019-12-11 RX ADMIN — Medication 2 TABLET: at 08:13

## 2019-12-11 RX ADMIN — ISOSORBIDE MONONITRATE 60 MG: 60 TABLET, EXTENDED RELEASE ORAL at 22:39

## 2019-12-11 RX ADMIN — LISINOPRIL 40 MG: 20 TABLET ORAL at 08:13

## 2019-12-11 RX ADMIN — TAMSULOSIN HYDROCHLORIDE 0.4 MG: 0.4 CAPSULE ORAL at 18:19

## 2019-12-11 RX ADMIN — SIMVASTATIN 20 MG: 20 TABLET, FILM COATED ORAL at 22:39

## 2019-12-11 NOTE — PROGRESS NOTES
[id number] Verbal bedside report received from off going RN, Edgardo Diana. Pt awake in bed at this time. Voices no needs. Call light within reach.

## 2019-12-11 NOTE — PROGRESS NOTES
Pt accepted to [facility] for tomorrow. No other accepting bed @ this time. Will cont to follow. Tricia Queen RN,ext [phone].

## 2019-12-11 NOTE — PROGRESS NOTES
Problem: Diabetes Self-Management  Goal: *Disease process and treatment process  Description  Define diabetes and identify own type of diabetes; list 3 options for treating diabetes. Outcome: Progressing Towards Goal  Goal: *Incorporating nutritional management into lifestyle  Description  Describe effect of type, amount and timing of food on blood glucose; list 3 methods for planning meals. Outcome: Progressing Towards Goal  Goal: *Incorporating physical activity into lifestyle  Description  State effect of exercise on blood glucose levels. Outcome: Progressing Towards Goal  Goal: *Developing strategies to promote health/change behavior  Description  Define the ABC's of diabetes; identify appropriate screenings, schedule and personal plan for screenings. Outcome: Progressing Towards Goal  Goal: *Using medications safely  Description  State effect of diabetes medications on diabetes; name diabetes medication taking, action and side effects. Outcome: Progressing Towards Goal  Goal: *Monitoring blood glucose, interpreting and using results  Description  Identify recommended blood glucose targets  and personal targets. Outcome: Progressing Towards Goal  Goal: *Prevention, detection, treatment of acute complications  Description  List symptoms of hyper- and hypoglycemia; describe how to treat low blood sugar and actions for lowering  high blood glucose level. Outcome: Progressing Towards Goal  Goal: *Prevention, detection and treatment of chronic complications  Description  Define the natural course of diabetes and describe the relationship of blood glucose levels to long term complications of diabetes.   Outcome: Progressing Towards Goal  Goal: *Developing strategies to address psychosocial issues  Description  Describe feelings about living with diabetes; identify support needed and support network  Outcome: Progressing Towards Goal  Goal: *Insulin pump training  Outcome: Progressing Towards Goal  Goal: *Sick day guidelines  Outcome: Progressing Towards Goal  Goal: *Patient Specific Goal (EDIT GOAL, INSERT TEXT)  Outcome: Progressing Towards Goal     Problem: Patient Education: Go to Patient Education Activity  Goal: Patient/Family Education  Outcome: Progressing Towards Goal     Problem: Pressure Injury - Risk of  Goal: *Prevention of pressure injury  Description  Document Stefan Scale and appropriate interventions in the flowsheet. Outcome: Progressing Towards Goal  Note: Pressure Injury Interventions:  Sensory Interventions: Pressure redistribution bed/mattress (bed type)    Moisture Interventions: Check for incontinence Q2 hours and as needed, Internal/External urinary devices    Activity Interventions: Pressure redistribution bed/mattress(bed type)    Mobility Interventions: Pressure redistribution bed/mattress (bed type)    Nutrition Interventions: Document food/fluid/supplement intake                     Problem: Patient Education: Go to Patient Education Activity  Goal: Patient/Family Education  Outcome: Progressing Towards Goal     Problem: Falls - Risk of  Goal: *Absence of Falls  Description  Document Rebbecca Persons Fall Risk and appropriate interventions in the flowsheet.   Outcome: Progressing Towards Goal  Note: Fall Risk Interventions:  Mobility Interventions: Bed/chair exit alarm, Patient to call before getting OOB         Medication Interventions: Bed/chair exit alarm, Patient to call before getting OOB    Elimination Interventions: Bed/chair exit alarm, Call light in reach, Patient to call for help with toileting needs    History of Falls Interventions: Bed/chair exit alarm, Door open when patient unattended, Room close to nurse's station         Problem: Patient Education: Go to Patient Education Activity  Goal: Patient/Family Education  Outcome: Progressing Towards Goal     Problem: Risk for Spread of Infection  Goal: Prevent transmission of infectious organism to others  Description  Prevent the transmission of infectious organisms to other patients, staff members, and visitors.   Outcome: Progressing Towards Goal     Problem: Patient Education:  Go to Education Activity  Goal: Patient/Family Education  Outcome: Progressing Towards Goal

## 2019-12-11 NOTE — PROGRESS NOTES
Problem: Diabetes Self-Management  Goal: *Disease process and treatment process  Description  Define diabetes and identify own type of diabetes; list 3 options for treating diabetes. Outcome: Progressing Towards Goal  Goal: *Incorporating nutritional management into lifestyle  Description  Describe effect of type, amount and timing of food on blood glucose; list 3 methods for planning meals. Outcome: Progressing Towards Goal  Goal: *Incorporating physical activity into lifestyle  Description  State effect of exercise on blood glucose levels. Outcome: Progressing Towards Goal  Goal: *Developing strategies to promote health/change behavior  Description  Define the ABC's of diabetes; identify appropriate screenings, schedule and personal plan for screenings. Outcome: Progressing Towards Goal  Goal: *Using medications safely  Description  State effect of diabetes medications on diabetes; name diabetes medication taking, action and side effects. Outcome: Progressing Towards Goal  Goal: *Monitoring blood glucose, interpreting and using results  Description  Identify recommended blood glucose targets  and personal targets. Outcome: Progressing Towards Goal  Goal: *Prevention, detection, treatment of acute complications  Description  List symptoms of hyper- and hypoglycemia; describe how to treat low blood sugar and actions for lowering  high blood glucose level. Outcome: Progressing Towards Goal  Goal: *Prevention, detection and treatment of chronic complications  Description  Define the natural course of diabetes and describe the relationship of blood glucose levels to long term complications of diabetes.   Outcome: Progressing Towards Goal  Goal: *Patient Specific Goal (EDIT GOAL, INSERT TEXT)  Outcome: Progressing Towards Goal     Problem: Patient Education: Go to Patient Education Activity  Goal: Patient/Family Education  Outcome: Progressing Towards Goal     Problem: Pressure Injury - Risk of  Goal: *Prevention of pressure injury  Description  Document Stefan Scale and appropriate interventions in the flowsheet. Outcome: Progressing Towards Goal  Note: Pressure Injury Interventions:  Sensory Interventions: Minimize linen layers, Float heels    Moisture Interventions: Absorbent underpads, Minimize layers    Activity Interventions: Increase time out of bed    Mobility Interventions: Float heels, PT/OT evaluation    Nutrition Interventions: Document food/fluid/supplement intake                     Problem: Patient Education: Go to Patient Education Activity  Goal: Patient/Family Education  Outcome: Progressing Towards Goal     Problem: Falls - Risk of  Goal: *Absence of Falls  Description  Document Clayton Bullion Fall Risk and appropriate interventions in the flowsheet.   Outcome: Progressing Towards Goal  Note: Fall Risk Interventions:  Mobility Interventions: Bed/chair exit alarm, PT Consult for mobility concerns, PT Consult for assist device competence    Mentation Interventions: Bed/chair exit alarm, Door open when patient unattended, Room close to nurse's station, Update white board    Medication Interventions: Bed/chair exit alarm, Patient to call before getting OOB    Elimination Interventions: Bed/chair exit alarm, Call light in reach, Stay With Me (per policy), Urinal in reach    History of Falls Interventions: Bed/chair exit alarm, Vital signs minimum Q4HRs X 24 hrs (comment for end date)         Problem: Patient Education: Go to Patient Education Activity  Goal: Patient/Family Education  Outcome: Progressing Towards Goal     Problem: Patient Education: Go to Patient Education Activity  Goal: Patient/Family Education  Outcome: Progressing Towards Goal     Problem: Patient Education: Go to Patient Education Activity  Goal: Patient/Family Education  Outcome: Progressing Towards Goal

## 2019-12-11 NOTE — PROGRESS NOTES
Problem: Self Care Deficits Care Plan (Adult)  Goal: *Acute Goals and Plan of Care (Insert Text)  Description  Occupational Therapy Goals  Initiated 12/9/2019 within 7 day(s). 1.  Patient will perform upper body dressing and bathing with modified independence. 2.  Patient will perform lower body dressing and bathing with supervision/set-up. 3.  Patient will perform toilet transfers with modified independence. 4.  Patient will perform all aspects of toileting with modified independence. 5.  Patient will participate in upper extremity therapeutic exercise/activities with independence for 8 minutes. Prior Level of Function:  Patient receiving HH OT/PT and HHA services (2X/wk). Outcome: Progressing Towards Goal   OCCUPATIONAL THERAPY TREATMENT    Patient: Claudio Cabrales (23 y.o. male)  Date: 12/11/2019  Diagnosis: Hypokalemia [E87.6]  Hypokalemia [E87.6]   Hypokalemia       Precautions: Fall, Other (comment)([employer])    Chart, occupational therapy assessment, plan of care, and goals were reviewed. ASSESSMENT:  Pt sitting up in bed agreeable to OT session. Upon assisting pt to EOB, noted chux/gown soiled in urine/mucus BM with pt reporting need to use restroom. Additional time required for all mobility due to IV management and increased need for safety cues. Provided CGA for STS from bed to RW and Min A for bathroom mobility with mod cues for RW placement and caution with IV. Pt able to complete decent to standard commode with use of grab bar and sink for UE support given CGA. Extended time for pt to complete toileting; chux/linens changed. He stood from commode again with UE support CGA and completed periarea/BM hygiene in standing with CGA at RW. Pt maneuvered to sink for hand hygiene, requiring mod cues for RW placement. Functional mobility in room using RW Min A with mod cues again for safety/RW as pt keep RW away from him. Increased time as pt takes short/small steps.    Pt transferred to bedside chair, made comfortable with all needs in reach at end of session. Progression toward goals:  [x]          Improving appropriately and progressing toward goals  []          Improving slowly and progressing toward goals  []          Not making progress toward goals and plan of care will be adjusted     PLAN:  Patient continues to benefit from skilled intervention to address the above impairments. Continue treatment per established plan of care. Discharge Recommendations:  Rehab   Further Equipment Recommendations for Discharge:  3:1 commode      SUBJECTIVE:   Patient stated Yes, I do.     OBJECTIVE DATA SUMMARY:   Cognitive/Behavioral Status:  Neurologic State: Alert  Orientation Level: Oriented to person, Oriented to situation, Oriented to place  Cognition: Follows commands, Poor safety awareness  Safety/Judgement: Fall prevention    Functional Mobility and Transfers for ADLs:  Bed Mobility:  Rolling: Supervision  Supine to Sit: Supervision     Transfers:  Sit to Stand: Contact guard assistance  Stand to Sit: Contact guard assistance  Toilet Transfer : Minimum assistance  Bathroom Mobility: Minimum assistance      Balance:  Sitting: Intact  Standing: Impaired; With support  Standing - Static: Fair(F+ )  Standing - Dynamic : Fair    ADL Intervention:  Grooming  Grooming Assistance: Contact guard assistance  Position Performed: Standing  Washing Hands: Contact guard assistance  Cues: Verbal cues provided  Adaptive Equipment: Other (comment)(RW )    Upper Body Dressing Assistance  [facility]: [facility]: [facility]up    Toileting  Toileting Assistance: Contact guard assistance  Bladder Hygiene: Contact guard assistance  Bowel Hygiene: Contact guard assistance  Clothing Management: Contact guard assistance  Cues: Verbal cues provided  Adaptive Equipment: Walker    Cognitive Retraining  Safety/Judgement: Fall prevention    Pain:  Pain level pre-treatment: 0/10   Pain level post-treatment: 0/10    Activity Tolerance:    Fair+   Please refer to the flowsheet for vital signs taken during this treatment. After treatment:   [x]  Patient left in no apparent distress sitting up in chair  []  Patient left in no apparent distress in bed  [x]  Call bell left within reach  []  Nursing notified  []  Caregiver present  []  Bed alarm activated    COMMUNICATION/EDUCATION:   [x] Role of Occupational Therapy in the acute care setting  [x] Home safety education was provided and the patient/caregiver indicated understanding. [x] Patient/family have participated as able in working towards goals and plan of care. [] Patient/family agree to work toward stated goals and plan of care. [] Patient understands intent and goals of therapy, but is neutral about his/her participation. [] Patient is unable to participate in goal setting and plan of care.       Thank you for this referral.  CHERY Smith   Time Calculation: 28 mins

## 2019-12-11 NOTE — PROGRESS NOTES
Problem: Pressure Injury - Risk of  Goal: *Prevention of pressure injury  Description  Document Stefan Scale and appropriate interventions in the flowsheet. Outcome: Progressing Towards Goal  Note: Pressure Injury Interventions:  Sensory Interventions: Assess changes in LOC, Keep linens dry and wrinkle-free    Moisture Interventions: Absorbent underpads, Offer toileting Q_hr    Activity Interventions: Increase time out of bed    Mobility Interventions: Pressure redistribution bed/mattress (bed type)    Nutrition Interventions: Document food/fluid/supplement intake, Offer support with meals,snacks and hydration                     Problem: Falls - Risk of  Goal: *Absence of Falls  Description  Document Trent Fall Risk and appropriate interventions in the flowsheet. Outcome: Progressing Towards Goal  Note: Fall Risk Interventions:  Mobility Interventions: Communicate number of staff needed for ambulation/transfer, Utilize walker, cane, or other assistive device         Medication Interventions: Bed/chair exit alarm    Elimination Interventions: Call light in reach, Patient to call for help with toileting needs    History of Falls Interventions: Bed/chair exit alarm         Problem: Risk for Spread of Infection  Goal: Prevent transmission of infectious organism to others  Description  Prevent the transmission of infectious organisms to other patients, staff members, and visitors.   Outcome: Progressing Towards Goal

## 2019-12-11 NOTE — PROGRESS NOTES
conducted a Follow up consultation and Spiritual Assessment for Shaila Vernon, who is a 80 y.o.,male. The  provided the following Interventions:  Continued the relationship of care and support. Listened empathically. Offered prayer and assurance of continued prayer on patients behalf. Chart reviewed. The following outcomes were achieved:  Patient expressed gratitude for pastoral care visit. Assessment:  There are no further spiritual or [employer] issues which require Spiritual Care Services interventions at this time. Plan:  Chaplains will continue to follow and will provide pastoral care on an as needed/requested basis.  recommends bedside caregivers page  on duty if patient shows signs of acute spiritual or emotional distress.      [address] [facility]   Staff [address] [facility]   [phone]

## 2019-12-11 NOTE — PROGRESS NOTES
Internal Medicine Progress Note        NAME: Palmira Quintanilla   :  10/20/1933  MRM:  [id number]    Date/Time: 2019        ASSESSMENT/PLAN:    Discharge yesterday held as pt changed his mind and agree to go to rehab. Per CM, [facility] bed will be available in am . No complain . Replete K       #  Hypokalemia. Replete and trend. Mg level checked .   magnesium low normal  . He is on home maintenance per his PCP. Follow      #  DM. Provide SSI, hypoglycemia protocol and frequent Accu checks.   Provide SSI, hypoglycemia protocol and frequent Accu checks.  Education,  diabetic educator      #  UTI . Urine grew GNR . Treated with iv  Rocephin.   f/u urine culture: Grew  >100,000 COLONIES/mL KLEBSIELLA PNEUMONIAE  Pan sensitive except ampicillin. He had 2 doses of iv CFTX, will dc on po LVQ per data.      # Diarrhea:  resolved per patient , no current diarrhea. negative  C. Diff test     # HTN. Control BP        #multiple falls.  Possible due to neuropathy , may be other reasons like arthritic changes, vision decline with age,,, etc. Seen by   PT/OT consult for disposition, declined rehab if needed  and going home     #  DM neuropathy - cont gabapentin    Lab Review:     Recent Labs     19  0355 12/10/19  0358 19  0516   WBC 9.0 8.5 8.0   HGB 11.4* 10.7* 10.3*   HCT 35.5* 33.8* 32.6*    229 218     Recent Labs     19  0355 12/10/19  0358 19  0516    139 140   K 3.2* 3.4* 3.1*    108 108   CO2 26 27 28   * 90 97   BUN 19* 18 16   CREA 1.09 1.21 1.18   CA 8.4* 8.6 8.3*   MG 1.9 2.0 2.0   PHOS 3.4 2.8 3.1     Lab Results   Component Value Date/Time    Glucose (POC) 100 12/10/2019 09:42 PM    Glucose (POC) 114 (H) 2019 04:27 PM    Glucose (POC) 110 2019 11:48 AM    Glucose (POC) 96 2019 07:42 AM    Glucose (POC) 117 (H) 2019 10:11 PM    Glucose (POC) 99 10/25/2017 10:02 AM    Glucose,  (H) 2019 04:17 PM     No results for input(s): PH, PCO2, PO2, HCO3, FIO2 in the last 72 hours. No results for input(s): INR, INREXT, INREXT in the last 72 hours. No results found for: SDES  Lab Results   Component Value Date/Time    Culture result:  12/08/2019 02:25 PM     NO AEROMONAS,SALMONELLA,SHIGELLA,E. COLI 0:157 OR CAMPYLOBACTER ISOLATED TO DATE    Culture result: REDUCED GRAM NEGATIVE ENTERIC MICROBIOTA 12/08/2019 02:25 PM    Culture result: >100,000 COLONIES/mL KLEBSIELLA PNEUMONIAE (A) 12/07/2019 02:27 PM    Culture result: (A) 12/07/2019 02:27 PM     >100,000 COLONIES/mL KLEBSIELLA PNEUMONIAE 2ND MORPHOTYPE       All Cardiac Markers in the last 24 hours: No results found for: CPK, CK, CKMMB, CKMB, RCK3, CKMBT, CKNDX, CKND1, TAURUS, TROPT, TROIQ, QUE, TROPT, TNIPOC, BNP, BNPP           Subjective:     Chief Complaint:      Offer no complain     ROS:  (bold if positive,otherwise negative)    Fever/chills ,  Dysuria   Cough , Sputum , SOB/ZHANG , Chest Pain     Diarrhea ,Nausea/Vomit , Abd Pain , Constipation     Tolerating Diet                Objective:     Vitals:  Last 24hrs VS reviewed since prior progress note.  Most recent are:    Visit Vitals  /75 (BP 1 Location: Left arm, BP Patient Position: At rest)   Pulse 98   Temp 97.8 °F (36.6 °C)   Resp 15   Ht 6' 1\" (1.854 m)   Wt 87.3 kg (192 lb 6.4 oz)   SpO2 98%   BMI 25.38 kg/m²     SpO2 Readings from Last 6 Encounters:   12/11/19 98%   08/27/19 96%   04/07/19 100%   11/29/18 98%   10/11/18 98%   10/27/17 97%            Intake/Output Summary (Last 24 hours) at 12/11/2019 0731  Last data filed at 12/11/2019 0407  Gross per 24 hour   Intake 120 ml   Output 100 ml   Net 20 ml          Physical Exam:   General appearance: alert, cooperative, no distress, appears stated age  Head: Normocephalic, without obvious abnormality, atraumatic  Neck: supple, trachea midline  Lungs: clear to auscultation bilaterally  Heart: regular rate and rhythm, S1, S2 normal, +  murmur, click, rub or gallop  Abdomen: soft, non-tender.  Bowel sounds normal. No masses,  no organomegaly  Extremities: extremities normal, atraumatic, no cyanosis or edema  Skin: Skin color, texture, turgor normal. No rashes or lesions  Neurologic:  tremors noted at times    Medications Reviewed: (see below)    Lab Data Reviewed: (see below)    ______________________________________________________________________    Medications:     Current Facility-Administered Medications   Medication Dose Route Frequency    potassium chloride SR (KLOR-CON 10) tablet 40 mEq  40 mEq Oral NOW    Lactobacillus Acidoph & Bulgar (FLORANEX) tablet 2 Tab  2 Tab Oral BID    carbidopa-levodopa ER (SINEMET CR)  mg per tablet 1 Tab (Patient Supplied)  1 Tab Oral TID    aspirin chewable tablet 81 mg  81 mg Oral DAILY    docusate sodium (COLACE) capsule 100 mg  100 mg Oral DAILY    finasteride (PROSCAR) tablet 5 mg  5 mg Oral DAILY    gabapentin (NEURONTIN) capsule 600 mg  600 mg Oral QHS    isosorbide mononitrate ER (IMDUR) tablet 60 mg  60 mg Oral QHS    metoprolol tartrate (LOPRESSOR) tablet 25 mg  25 mg Oral BID    lisinopril (PRINIVIL, ZESTRIL) tablet 40 mg  40 mg Oral DAILY    simvastatin (ZOCOR) tablet 20 mg  20 mg Oral QHS    tamsulosin (FLOMAX) capsule 0.4 mg  0.4 mg Oral PCD    acetaminophen (TYLENOL) tablet 650 mg  650 mg Oral Q4H PRN    ondansetron (ZOFRAN) injection 4 mg  4 mg IntraVENous Q4H PRN    enoxaparin (LOVENOX) injection 40 mg  40 mg SubCUTAneous Q24H    cefTRIAXone (ROCEPHIN) 1 g in 0.9% sodium chloride (MBP/ADV) 50 mL MBP  1 g IntraVENous Q24H          Total time spent with patient: 25 minutes                  Care Plan discussed with: Patient, Care Manager, Nursing Staff and >50% of time spent in counseling and coordination of care    Discussed:  Care Plan    Prophylaxis:  Lovenox                  Attending Physician: Frank Beatty MD

## 2019-12-11 NOTE — ROUTINE PROCESS
1955 Bedside, Verbal and Written shift change report given to 1921 Lisa Del Rosario (oncoming nurse) by Jaime Navarrete (offgoing nurse). Report included the following information SBAR, Kardex, Intake/Output, MAR and Recent Results. 2200 PM medications administered, pt tolerated with ease, will continue to monitor. 0000 Shift reassessment, pt condition unchanged, will continue to monitor. 0400  Shift reassessment, pt sleeping between care, will continue to monitor. 2745 Bedside, Verbal and Written shift change report given to [facility] (oncoming nurse) by Monster Plascencia RN (offgoing nurse). Report included the following information SBAR, Kardex, Intake/Output, MAR and Recent Results. Skin assessment completed.

## 2019-12-12 ENCOUNTER — HOSPITAL ENCOUNTER (INPATIENT)
Age: 84
LOS: 8 days | Discharge: HOME HEALTH | End: 2019-12-20
Attending: INTERNAL MEDICINE | Admitting: INTERNAL MEDICINE

## 2019-12-12 VITALS
HEART RATE: 69 BPM | RESPIRATION RATE: 16 BRPM | HEIGHT: 73 IN | TEMPERATURE: 97.8 F | DIASTOLIC BLOOD PRESSURE: 66 MMHG | OXYGEN SATURATION: 97 % | WEIGHT: 186.95 LBS | BODY MASS INDEX: 24.78 KG/M2 | SYSTOLIC BLOOD PRESSURE: 116 MMHG

## 2019-12-12 LAB
ANION GAP SERPL CALC-SCNC: 7 MMOL/L (ref 3–18)
BASOPHILS # BLD: 0 K/UL (ref 0–0.1)
BASOPHILS NFR BLD: 0 % (ref 0–2)
BUN SERPL-MCNC: 21 MG/DL (ref 7–18)
BUN/CREAT SERPL: 20 (ref 12–20)
CALCIUM SERPL-MCNC: 8.5 MG/DL (ref 8.5–10.1)
CHLORIDE SERPL-SCNC: 107 MMOL/L (ref 100–111)
CO2 SERPL-SCNC: 26 MMOL/L (ref 21–32)
CREAT SERPL-MCNC: 1.04 MG/DL (ref 0.6–1.3)
DIFFERENTIAL METHOD BLD: ABNORMAL
EOSINOPHIL # BLD: 0.2 K/UL (ref 0–0.4)
EOSINOPHIL NFR BLD: 3 % (ref 0–5)
ERYTHROCYTE [DISTWIDTH] IN BLOOD BY AUTOMATED COUNT: 15.3 % (ref 11.6–14.5)
GLUCOSE BLD STRIP.AUTO-MCNC: 116 MG/DL (ref 70–110)
GLUCOSE BLD STRIP.AUTO-MCNC: 157 MG/DL (ref 70–110)
GLUCOSE BLD STRIP.AUTO-MCNC: 96 MG/DL (ref 70–110)
GLUCOSE SERPL-MCNC: 96 MG/DL (ref 74–99)
HCT VFR BLD AUTO: 36.7 % (ref 36–48)
HGB BLD-MCNC: 11.5 G/DL (ref 13–16)
LYMPHOCYTES # BLD: 2.7 K/UL (ref 0.9–3.6)
LYMPHOCYTES NFR BLD: 30 % (ref 21–52)
MAGNESIUM SERPL-MCNC: 1.9 MG/DL (ref 1.6–2.6)
MCH RBC QN AUTO: 28.1 PG (ref 24–34)
MCHC RBC AUTO-ENTMCNC: 31.3 G/DL (ref 31–37)
MCV RBC AUTO: 89.7 FL (ref 74–97)
MONOCYTES # BLD: 0.6 K/UL (ref 0.05–1.2)
MONOCYTES NFR BLD: 7 % (ref 3–10)
NEUTS SEG # BLD: 5.5 K/UL (ref 1.8–8)
NEUTS SEG NFR BLD: 60 % (ref 40–73)
O+P SPEC MICRO: NORMAL
O+P STL CONC: NORMAL
PHOSPHATE SERPL-MCNC: 3.3 MG/DL (ref 2.5–4.9)
PLATELET # BLD AUTO: 260 K/UL (ref 135–420)
PMV BLD AUTO: 11 FL (ref 9.2–11.8)
POTASSIUM SERPL-SCNC: 4.1 MMOL/L (ref 3.5–5.5)
RBC # BLD AUTO: 4.09 M/UL (ref 4.7–5.5)
SODIUM SERPL-SCNC: 140 MMOL/L (ref 136–145)
SPECIMEN SOURCE: NORMAL
WBC # BLD AUTO: 9 K/UL (ref 4.6–13.2)

## 2019-12-12 PROCEDURE — 85025 COMPLETE CBC W/AUTO DIFF WBC: CPT

## 2019-12-12 PROCEDURE — 84100 ASSAY OF PHOSPHORUS: CPT

## 2019-12-12 PROCEDURE — 74011250637 HC RX REV CODE- 250/637: Performed by: HOSPITALIST

## 2019-12-12 PROCEDURE — 82962 GLUCOSE BLOOD TEST: CPT

## 2019-12-12 PROCEDURE — 36415 COLL VENOUS BLD VENIPUNCTURE: CPT

## 2019-12-12 PROCEDURE — 74011636637 HC RX REV CODE- 636/637: Performed by: INTERNAL MEDICINE

## 2019-12-12 PROCEDURE — 74011250637 HC RX REV CODE- 250/637: Performed by: INTERNAL MEDICINE

## 2019-12-12 PROCEDURE — 83735 ASSAY OF MAGNESIUM: CPT

## 2019-12-12 PROCEDURE — 97535 SELF CARE MNGMENT TRAINING: CPT

## 2019-12-12 PROCEDURE — 80048 BASIC METABOLIC PNL TOTAL CA: CPT

## 2019-12-12 RX ORDER — DOCUSATE SODIUM 100 MG/1
100 CAPSULE, LIQUID FILLED ORAL DAILY
Status: DISCONTINUED | OUTPATIENT
Start: 2019-12-13 | End: 2019-12-16

## 2019-12-12 RX ORDER — MAGNESIUM SULFATE 100 %
4 CRYSTALS MISCELLANEOUS AS NEEDED
Status: DISCONTINUED | OUTPATIENT
Start: 2019-12-12 | End: 2019-12-12 | Stop reason: HOSPADM

## 2019-12-12 RX ORDER — NITROGLYCERIN 0.4 MG/1
0.4 TABLET SUBLINGUAL AS NEEDED
Status: DISCONTINUED | OUTPATIENT
Start: 2019-12-12 | End: 2019-12-20 | Stop reason: HOSPADM

## 2019-12-12 RX ORDER — UREA 10 %
2 LOTION (ML) TOPICAL 2 TIMES DAILY
Status: DISCONTINUED | OUTPATIENT
Start: 2019-12-12 | End: 2019-12-20 | Stop reason: HOSPADM

## 2019-12-12 RX ORDER — PENTOXIFYLLINE 400 MG/1
400 TABLET, EXTENDED RELEASE ORAL
Status: DISCONTINUED | OUTPATIENT
Start: 2019-12-13 | End: 2019-12-20 | Stop reason: HOSPADM

## 2019-12-12 RX ORDER — GLYCOPYRROLATE 1 MG/1
1 TABLET ORAL 2 TIMES DAILY
Status: DISCONTINUED | OUTPATIENT
Start: 2019-12-12 | End: 2019-12-20 | Stop reason: HOSPADM

## 2019-12-12 RX ORDER — ISOSORBIDE MONONITRATE 30 MG/1
60 TABLET, EXTENDED RELEASE ORAL DAILY
Status: DISCONTINUED | OUTPATIENT
Start: 2019-12-13 | End: 2019-12-20 | Stop reason: HOSPADM

## 2019-12-12 RX ORDER — POTASSIUM CHLORIDE 750 MG/1
10 TABLET, EXTENDED RELEASE ORAL DAILY
Status: DISCONTINUED | OUTPATIENT
Start: 2019-12-13 | End: 2019-12-16

## 2019-12-12 RX ORDER — METOPROLOL TARTRATE 25 MG/1
25 TABLET, FILM COATED ORAL 2 TIMES DAILY
Status: DISCONTINUED | OUTPATIENT
Start: 2019-12-12 | End: 2019-12-20 | Stop reason: HOSPADM

## 2019-12-12 RX ORDER — INSULIN LISPRO 100 [IU]/ML
INJECTION, SOLUTION INTRAVENOUS; SUBCUTANEOUS
Status: DISCONTINUED | OUTPATIENT
Start: 2019-12-12 | End: 2019-12-20 | Stop reason: HOSPADM

## 2019-12-12 RX ORDER — TAMSULOSIN HYDROCHLORIDE 0.4 MG/1
0.4 CAPSULE ORAL EVERY EVENING
Status: DISCONTINUED | OUTPATIENT
Start: 2019-12-12 | End: 2019-12-20 | Stop reason: HOSPADM

## 2019-12-12 RX ORDER — DEXTROSE MONOHYDRATE 100 MG/ML
125-250 INJECTION, SOLUTION INTRAVENOUS AS NEEDED
Status: DISCONTINUED | OUTPATIENT
Start: 2019-12-12 | End: 2019-12-12 | Stop reason: HOSPADM

## 2019-12-12 RX ORDER — SIMVASTATIN 20 MG/1
20 TABLET, FILM COATED ORAL
Status: DISCONTINUED | OUTPATIENT
Start: 2019-12-12 | End: 2019-12-20 | Stop reason: HOSPADM

## 2019-12-12 RX ORDER — GUAIFENESIN 100 MG/5ML
81 LIQUID (ML) ORAL DAILY
Status: DISCONTINUED | OUTPATIENT
Start: 2019-12-13 | End: 2019-12-20 | Stop reason: HOSPADM

## 2019-12-12 RX ORDER — FINASTERIDE 5 MG/1
5 TABLET, FILM COATED ORAL DAILY
Status: DISCONTINUED | OUTPATIENT
Start: 2019-12-13 | End: 2019-12-20 | Stop reason: HOSPADM

## 2019-12-12 RX ORDER — MAGNESIUM SULFATE 100 %
4 CRYSTALS MISCELLANEOUS AS NEEDED
Status: DISCONTINUED | OUTPATIENT
Start: 2019-12-12 | End: 2019-12-20 | Stop reason: HOSPADM

## 2019-12-12 RX ORDER — LOPERAMIDE HYDROCHLORIDE 2 MG/1
2 CAPSULE ORAL DAILY
Status: DISCONTINUED | OUTPATIENT
Start: 2019-12-13 | End: 2019-12-20 | Stop reason: HOSPADM

## 2019-12-12 RX ORDER — CARBIDOPA AND LEVODOPA 25; 100 MG/1; MG/1
2 TABLET, EXTENDED RELEASE ORAL 3 TIMES DAILY
Status: DISCONTINUED | OUTPATIENT
Start: 2019-12-12 | End: 2019-12-20 | Stop reason: HOSPADM

## 2019-12-12 RX ORDER — RAMIPRIL 10 MG/1
10 CAPSULE ORAL DAILY
Status: DISCONTINUED | OUTPATIENT
Start: 2019-12-13 | End: 2019-12-20 | Stop reason: HOSPADM

## 2019-12-12 RX ORDER — INSULIN LISPRO 100 [IU]/ML
INJECTION, SOLUTION INTRAVENOUS; SUBCUTANEOUS
Status: DISCONTINUED | OUTPATIENT
Start: 2019-12-12 | End: 2019-12-12 | Stop reason: HOSPADM

## 2019-12-12 RX ADMIN — CARBIDOPA AND LEVODOPA 1 TABLET: 50; 200 TABLET, EXTENDED RELEASE ORAL at 08:46

## 2019-12-12 RX ADMIN — CARBIDOPA AND LEVODOPA 2 TABLET: 25; 100 TABLET, EXTENDED RELEASE ORAL at 22:10

## 2019-12-12 RX ADMIN — METOPROLOL TARTRATE 25 MG: 25 TABLET, FILM COATED ORAL at 18:28

## 2019-12-12 RX ADMIN — METOPROLOL TARTRATE 25 MG: 25 TABLET ORAL at 08:44

## 2019-12-12 RX ADMIN — Medication 2 TABLET: at 18:28

## 2019-12-12 RX ADMIN — INSULIN LISPRO 2 UNITS: 100 INJECTION, SOLUTION INTRAVENOUS; SUBCUTANEOUS at 12:28

## 2019-12-12 RX ADMIN — FINASTERIDE 5 MG: 5 TABLET, FILM COATED ORAL at 08:44

## 2019-12-12 RX ADMIN — LISINOPRIL 40 MG: 20 TABLET ORAL at 08:44

## 2019-12-12 RX ADMIN — CARBIDOPA AND LEVODOPA 1 TABLET: 50; 200 TABLET, EXTENDED RELEASE ORAL at 16:00

## 2019-12-12 RX ADMIN — TAMSULOSIN HYDROCHLORIDE 0.4 MG: 0.4 CAPSULE ORAL at 18:28

## 2019-12-12 RX ADMIN — DOCUSATE SODIUM 100 MG: 100 CAPSULE, LIQUID FILLED ORAL at 08:44

## 2019-12-12 RX ADMIN — Medication 2 TABLET: at 08:44

## 2019-12-12 RX ADMIN — GLYCOPYRROLATE 1 MG: 1 TABLET ORAL at 22:09

## 2019-12-12 RX ADMIN — GABAPENTIN 800 MG: 100 CAPSULE ORAL at 22:09

## 2019-12-12 RX ADMIN — SIMVASTATIN 20 MG: 20 TABLET, FILM COATED ORAL at 22:10

## 2019-12-12 RX ADMIN — ASPIRIN 81 MG CHEWABLE TABLET 81 MG: 81 TABLET CHEWABLE at 08:44

## 2019-12-12 NOTE — PROGRESS NOTES
Problem: Pressure Injury - Risk of  Goal: *Prevention of pressure injury  Description  Document Stefan Scale and appropriate interventions in the flowsheet. Outcome: Progressing Towards Goal  Note: Pressure Injury Interventions:  Sensory Interventions: Check visual cues for pain, Pressure redistribution bed/mattress (bed type)    Moisture Interventions: Apply protective barrier, creams and emollients, Absorbent underpads    Activity Interventions: Increase time out of bed, Pressure redistribution bed/mattress(bed type)    Mobility Interventions: Pressure redistribution bed/mattress (bed type)    Nutrition Interventions: Document food/fluid/supplement intake, Offer support with meals,snacks and hydration                     Problem: Patient Education: Go to Patient Education Activity  Goal: Patient/Family Education  Outcome: Progressing Towards Goal     Problem: Falls - Risk of  Goal: *Absence of Falls  Description  Document Trent Fall Risk and appropriate interventions in the flowsheet.   Outcome: Progressing Towards Goal  Note: Fall Risk Interventions:  Mobility Interventions: Bed/chair exit alarm, Patient to call before getting OOB    Mentation Interventions: Adequate sleep, hydration, pain control, Door open when patient unattended, Room close to nurse's station    Medication Interventions: Patient to call before getting OOB, Teach patient to arise slowly    Elimination Interventions: Call light in reach, Patient to call for help with toileting needs    History of Falls Interventions: Door open when patient unattended, Evaluate medications/consider consulting pharmacy         Problem: Patient Education: Go to Patient Education Activity  Goal: Patient/Family Education  Outcome: Progressing Towards Goal

## 2019-12-12 NOTE — PROGRESS NOTES
0730 report received from off going RNWesley. Pt resting in bed at this time, states comfortable with no needs. Assessment unremarkable; RA, no cardiac monitor. Bed alarm on, call light within reach. 1100 Pt up to chair. No needs voiced; resting comfortably. Will continue to monitor. 1520 Attempted to give report to receiving [facility]. [facility] RN states they will call back. 1545 Report given to receiving RN, Alice Orellana.  Pt to transfer to [address] [place], [place] [address] Floor room 3205 via wheelchair

## 2019-12-12 NOTE — DISCHARGE SUMMARY
Discharge Summary      [facility] service    Patient ID:  Larry Gonzales, 80 y.o., male  : 10/20/1933    Admit Date: 2019  Discharge Date:  2019  Length of stay: 4 day(s)    PCP:  Declan Amaya MD    Chief Complaint   Patient presents with    Fall     Discharge Diagnosis:     Hospital Problems  Date Reviewed: 2019          Codes Class Noted POA    UTI (urinary tract infection) ICD-10-CM: N39.0  ICD-9-CM: 599.0  2019 Yes        * (Principal) Hypokalemia ICD-10-CM: E87.6  ICD-9-CM: 276.8  2019 Yes        Type 2 diabetes mellitus with diabetic neuropathy ([facility] ICD-10-CM: E11.40  ICD-9-CM: 250.60, 357.2  2018 Yes        Parkinson's disease ([facility].) ICD-10-CM: G20  ICD-9-CM: 332.0  Unknown Yes        HTN (hypertension) ICD-10-CM: I10  ICD-9-CM: 401.9  2014 Yes        Pure hypercholesterolemia ICD-10-CM: E78.00  ICD-9-CM: 272.0  2014 Yes        DM (diabetes mellitus) ([facility] ICD-10-CM: E11.9  ICD-9-CM: 250.00  2014 Yes             Discharge instructions:    #  Discharge Diet:            Diet: Resume previous diet  # Discharge activity and restrictions: Activity as tolerated and PT/OT Eval and Treat    # Follow-up appointments: Follow-up Information     Follow up With Specialties Details Why Contact Info    Declan Amaya MD Internal Medicine Schedule an appointment as soon as possible for a visit in 1 week Follow up to discuss your recent hospitalization  [facility]      Declan Amaya MD Internal Medicine   [address]  [phone]              HPI on Admission (per admitting physician):  Larry Gonzales is a 80y.o. year old male who presents with  Generalized weakness. This gentleman has parkinsons disease and family noted more weakness if ambulation /movement today. Patient is found in ed to have low potassium and a UTI.    For further details and initial management please refer to H/P. Hospital Course:      Discharge 2 days ago  held as pt changed his mind and agree to go to rehab. Per , [facility] bed  available this am . No complain . Patient deny any complain . He finished course of IV Ceftriaxone. By Problems :      #  UTI . Urine grew GNR . Treated with iv  Rocephin course.   f/u urine culture: Grew  >100,000 COLONIES/mL KLEBSIELLA PNEUMONIAE  Pan sensitive except ampicillin.         #  Hypokalemia. Replete and trend. Mg level checked .   magnesium low normal  . He is on home maintenance per his PCP. Follow      #  DM. Provide SSI, hypoglycemia protocol and frequent Accu checks.   Provide SSI, hypoglycemia protocol and frequent Accu checks.  Education,  diabetic educator .        # Diarrhea:  resolved per patient , no current diarrhea. negative  C. Diff test     # HTN. Control BP        # multiple falls. Possible due to neuropathy , may be other reasons like arthritic changes, vision decline with age,,, etc. Seen by   PT/OT consult for disposition, initially declined rehab if needed  and going home     #  DM neuropathy - cont gabapentin    Discharge condition:  improved and stable    Disposition:  Home    Procedures:   * No surgery found *      Consultants: None    Physical Exam on Discharge:  Visit Vitals  /63 (BP 1 Location: Left arm, BP Patient Position: Head of bed elevated (Comment degrees))   Pulse 68   Temp 98.1 °F (36.7 °C)   Resp 16   Ht 6' 1\" (1.854 m)   Wt 84.8 kg (186 lb 15.2 oz)   SpO2 98%   BMI 24.67 kg/m²   General appearance: alert, cooperative, no distress, appears stated age  Head: Normocephalic, without obvious abnormality, atraumatic  Neck: supple, trachea midline  Lungs: clear to auscultation bilaterally  Heart: regular rate and rhythm, S1, S2 normal, +  murmur, click, rub or gallop  Abdomen: soft, non-tender.  Bowel sounds normal. No masses,  no organomegaly  Extremities: extremities normal, atraumatic, no cyanosis or edema  Skin: Skin color, texture, turgor normal. No rashes or lesions  Neurologic:  tremors noted at times    Hospitalization and discharge instructions d/c in details with : patient , family , Nursing/CM     Discharge medications :  Current Discharge Medication List      START taking these medications    Details   carbidopa-levodopa ER (SINEMET CR)  mg per tablet Take 1 Tab by mouth three (3) times daily. Qty: 1 Tab, Refills: 0      Lactobacillus Acidoph & Bulgar (FLORANEX) 1 million cell tab tablet Take 2 Tabs by mouth two (2) times a day for 14 days. Qty: 56 Tab, Refills: 0         CONTINUE these medications which have NOT CHANGED    Details   pentoxifylline CR (TRENTAL) 400 mg CR tablet Take 400 mg by mouth daily. 1 tab daily      loperamide (IMODIUM A-D) 2 mg tablet Take 2 mg by mouth daily. Indications: diarrhea      finasteride (PROSCAR) 5 mg tablet Take 5 mg by mouth daily. potassium chloride SR (KLOR-CON 10) 10 mEq tablet Take  by mouth. glycopyrrolate (ROBINUL) 1 mg tablet Take 1 mg by mouth two (2) times a day. parkinson       isosorbide mononitrate ER (IMDUR) 60 mg CR tablet Take 1 Tab by mouth daily. Qty: 30 Tab, Refills: 6      ramipril (ALTACE) 10 mg capsule Take 1 Cap by mouth daily. Qty: 30 Cap, Refills: 0      sitaGLIPtin (JANUVIA) 50 mg tablet Take 1 Tab by mouth daily. Qty: 30 Tab, Refills: 0      gabapentin (NEURONTIN) 600 mg tablet Take 800 mg by mouth two (2) times a day. simvastatin (ZOCOR) 20 mg tablet Take 20 mg by mouth nightly. tamsulosin (FLOMAX) 0.4 mg capsule Take 1 capsule by mouth daily (after dinner). Qty: 90 capsule, Refills: 3    Associated Diagnoses: Urinary retention; BPH (benign prostatic hyperplasia)      metoprolol (LOPRESSOR) 25 mg tablet Take 1 Tab by mouth two (2) times a day. Qty: 60 Tab, Refills: 11      aspirin 81 mg chewable tablet Take 1 Tab by mouth daily.   Qty: 90 Tab, Refills: 3      nitroglycerin (NITROSTAT) 0.4 mg SL tablet 1 Tab by SubLINGual route as needed for Chest Pain. Qty: 1 Bottle, Refills: 3      docusate sodium (COLACE) 100 mg capsule Take 100 mg by mouth daily. STOP taking these medications       carbidopa-levodopa (SINEMET-25/250)  mg per tablet Comments:   Reason for Stopping:                   Most Recent Labs:       Recent Labs     12/12/19  0425 12/11/19  0355 12/10/19  0358   WBC 9.0 9.0 8.5   HGB 11.5* 11.4* 10.7*   HCT 36.7 35.5* 33.8*    238 229     Recent Labs     12/12/19  0425 12/11/19  0355 12/10/19  0358    139 139   K 4.1 3.2* 3.4*    106 108   CO2 26 26 27   GLU 96 101* 90   BUN 21* 19* 18   CREA 1.04 1.09 1.21   CA 8.5 8.4* 8.6   MG 1.9 1.9 2.0   PHOS 3.3 3.4 2.8     Lab Results   Component Value Date/Time    Glucose (POC) 96 12/12/2019 08:30 AM    Glucose (POC) 134 (H) 12/11/2019 04:34 PM    Glucose (POC) 140 (H) 12/11/2019 11:50 AM    Glucose (POC) 107 12/11/2019 08:03 AM    Glucose (POC) 100 12/10/2019 09:42 PM    Glucose (POC) 99 10/25/2017 10:02 AM    Glucose,  (H) 12/07/2019 04:17 PM     No results for input(s): PH, PCO2, PO2, HCO3, FIO2 in the last 72 hours. No results for input(s): INR, INREXT, INREXT in the last 72 hours.     No results found for: SDES  Lab Results   Component Value Date/Time    Culture result:  12/08/2019 02:25 PM     NO AEROMONAS,SALMONELLA,SHIGELLA,E. COLI 0:157 OR CAMPYLOBACTER ISOLATED    Culture result: REDUCED GRAM NEGATIVE ENTERIC MICROBIOTA 12/08/2019 02:25 PM    Culture result: >100,000 COLONIES/mL KLEBSIELLA PNEUMONIAE (A) 12/07/2019 02:27 PM    Culture result: (A) 12/07/2019 02:27 PM     >100,000 COLONIES/mL KLEBSIELLA PNEUMONIAE 2ND MORPHOTYPE       All Cardiac Markers in the last 24 hours: No results found for: CPK, CK, CKMMB, CKMB, RCK3, CKMBT, CKNDX, CKND1, TAURUS, TROPT, TROIQ, QUE, TROPT, TNIPOC, BNP, BNPP    XR Results:  Results from Hospital Encounter encounter on 12/07/19   XR ANKLE RT MIN 3 V    Narrative EXAM: RIGHT ANKLE RADIOGRAPHS    CLINICAL INDICATION/HISTORY: trauma  -Additional: Right ankle injury with pain. COMPARISON: None    TECHNIQUE: 3 views of the right ankle.    _______________    FINDINGS:    BONES: No evidence of acute fracture. Chronic posttraumatic deformity of tibia  shaft. Significant degenerative disease of the ankle joint with prominent  anterior osteophytosis. 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 SOFT TISSUES: Soft tissue and vascular calcifications. There does appear to be  some edema around the ankle joint.    _______________      Impression IMPRESSION:    No evidence of acute fracture. Prominent degenerative changes of the ankle. CT Results:  Results from Hospital Encounter encounter on 12/07/19   CT PELV WO CONT    Narrative EXAM: CT PELVIS    CLINICAL INDICATION/HISTORY: Fall with pelvic injury and pain.    > Additional: None. COMPARISON: 12/21/2015    TECHNIQUE: Helical CT acquisition of the pelvis without contrast. Coronal and  sagittal reformats were generated and reviewed. One or more dose reduction  techniques were used on this CT: automated exposure control, adjustment of the  mAs and/or kVp according to patient size, and iterative reconstruction  techniques. The specific techniques used on this CT exam have been documented  in the patient's electronic medical record. Digital Imaging and Communications  in Medicine (DICOM) format image data are available to nonaffiliated external  healthcare facilities or entities on a secure, media free, reciprocally  searchable basis with patient authorization for at least a 12-month period after  this study. _______________    FINDINGS:    OSSEOUS:      > Generalized osseous demineralization.     > No displaced fracture identified. Normal alignment.     > Mild-moderate joint space loss of both hips. Mild degenerative changes of  pubic symphysis and SI joints.     > Mild degenerative disc changes and moderate facet arthropathy in the lower  lumbar spine.     SOFT TISSUES:      > No regional soft tissue edema or hematoma identified. > Prominent stool burden in the rectum.     > Bladder wall thickening. Small amount of gas in the bladder, as well as  stippled foci of gas along the inner surface of the bladder wall.    _______________      Impression IMPRESSION:    1. No evidence of fracture. Generalized osseous demineralization. 2. Bladder wall thickening, with foci of gas in the bladder wall. 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 3. Prominent stool burden in the rectum noted. Total discharge time 33 minutes. David Booth MD  Hospitalist  [facility]. medical group. Hospitalist Division.   December 12, 2019  9:54 AM

## 2019-12-12 NOTE — PROGRESS NOTES
Problem: Self Care Deficits Care Plan (Adult)  Goal: *Acute Goals and Plan of Care (Insert Text)  Description  Occupational Therapy Goals  Initiated 12/9/2019 within 7 day(s). 1.  Patient will perform upper body dressing and bathing with modified independence. 2.  Patient will perform lower body dressing and bathing with supervision/set-up. 3.  Patient will perform toilet transfers with modified independence. 4.  Patient will perform all aspects of toileting with modified independence. 5.  Patient will participate in upper extremity therapeutic exercise/activities with independence for 8 minutes. Prior Level of Function:  Patient receiving HH OT/PT and HHA services (2X/wk). Outcome: Progressing Towards Goal   OCCUPATIONAL THERAPY TREATMENT    Patient: Lindsey Mcgovern (40 y.o. male)  Date: 12/12/2019  Diagnosis: Hypokalemia [E87.6]  Hypokalemia [E87.6]   Hypokalemia       Precautions: Fall, Other (comment)(Enteric)  Chart, occupational therapy assessment, plan of care, and goals were reviewed. ASSESSMENT:  Pt sitting up in bed having just finished breakfast. He was agreeable to assistance to the commode then up to chair. Pt with soiled bed linens, reporting he was unable to reach his urinal; instructed pt to call for assistance next time. Pt progressing with mobility, however still requiring Min A and moderate cues for RW placement to increase safety awareness. He was able to tolerate standing approx 5 mins to complete pericare/BM hygiene with CGA progressing to SBA today. Once grooming was completed, pt performed functional mobility in room to bedside chair. All items placed in reach and pt educated on calling for help to return to bed. Removed soiled bed linens; CNA in room tending to pt at end of session.    Progression toward goals:  [x]          Improving appropriately and progressing toward goals  []          Improving slowly and progressing toward goals  []          Not making progress toward goals and plan of care will be adjusted     PLAN:  Patient continues to benefit from skilled intervention to address the above impairments. Continue treatment per established plan of care. Discharge Recommendations:  Rehab  Further Equipment Recommendations for Discharge:  3;1 commode      SUBJECTIVE:   Patient stated Niles Trent stayed up til about 9; then I got back in bed.   Education on increasing safety by allowing staff to assist with getting up/transferring; pt verbalized understanding  OBJECTIVE DATA SUMMARY:   Cognitive/Behavioral Status:  Neurologic State: Alert  Orientation Level: Oriented to situation, Oriented to place, Oriented to person  Cognition: Follows commands, Poor safety awareness  Safety/Judgement: Fall prevention    Functional Mobility and Transfers for ADLs:  Bed Mobility:  Supine to Sit: Supervision; Additional time  Scooting: Supervision    Transfers:  Sit to Stand: Contact guard assistance  Stand to Sit: Contact guard assistance  Toilet Transfer : Minimum assistance  Bathroom Mobility: Minimum assistance      Balance:  Sitting: Intact  Standing: Impaired; With support  Standing - Static: Fair(F+ )  Standing - Dynamic : Fair    ADL Intervention:  Grooming  Grooming Assistance: Contact guard assistance  Position Performed: Standing  Washing Hands: Contact guard assistance  Cues: Verbal cues provided  Adaptive Equipment: Other (comment)(RW )    Upper Body Dressing Assistance  Dressing Assistance: [facility]: Set-up    Toileting  Toileting Assistance: Contact guard assistance;Stand-by assistance(progressing to SBA )  Bladder Hygiene: Contact guard assistance  Bowel Hygiene: Contact guard assistance  Clothing Management: Stand-by assistance  Adaptive Equipment: Walker    Cognitive Retraining  Safety/Judgement: Fall prevention    Pain:  Pain level pre-treatment: 0/10   Pain level post-treatment: 0/10  Pain Intervention(s): Medication (see MAR);  Rest, Repositioning  Response to intervention: Nurse notified, See doc flow    Activity Tolerance:    Fair+  Please refer to the flowsheet for vital signs taken during this treatment. After treatment:   [x]  Patient left in no apparent distress sitting up in chair  []  Patient left in no apparent distress in bed  [x]  Call bell left within reach  [x]  Nursing notified  []  Caregiver present  []  Bed alarm activated    COMMUNICATION/EDUCATION:   [x] Role of Occupational Therapy in the acute care setting  [x] Home safety education was provided and the patient/caregiver indicated understanding. [x] Patient/family have participated as able in working towards goals and plan of care. [] Patient/family agree to work toward stated goals and plan of care. [] Patient understands intent and goals of therapy, but is neutral about his/her participation. [] Patient is unable to participate in goal setting and plan of care.       Thank you for this referral.  CHERY Edward   Time Calculation: 23 mins

## 2019-12-12 NOTE — PROGRESS NOTES
OCCUPATIONAL THERAPY NOTE     Patient: Minal Conrad [de-identified]80 y.o. male)  Date: 12/12/2019  Diagnosis: Hypokalemia [E87.6]  Hypokalemia [E87.6] Hypokalemia      Precautions: Fall, Other (comment)(Enteric)  Chart, occupational therapy assessment, plan of care, and goals were reviewed.     Occupational Therapy treatment attempted. Patient is unable to participate due to:  []? Nausea/vomiting  []? Eating  []? Pain  []? Pt lethargic  []? Off Unit  [x]? Other:  Attempt x1 @ [id number], pt requesting to finish breakfast.      Will f/u later as schedule allows. Thank you.   COREY Calabrese/XIMENA

## 2019-12-12 NOTE — ANCILLARY DISCHARGE INSTRUCTIONS
Patient and/or next of kin has been given the [facility] Important Message From Medicare About Your Rights\" letter and all questions were answered.

## 2019-12-12 NOTE — PROGRESS NOTES
Problem: Diabetes Self-Management  Goal: *Disease process and treatment process  Description  Define diabetes and identify own type of diabetes; list 3 options for treating diabetes. Outcome: Progressing Towards Goal  Goal: *Incorporating nutritional management into lifestyle  Description  Describe effect of type, amount and timing of food on blood glucose; list 3 methods for planning meals. Outcome: Progressing Towards Goal  Goal: *Incorporating physical activity into lifestyle  Description  State effect of exercise on blood glucose levels. Outcome: Progressing Towards Goal  Goal: *Developing strategies to promote health/change behavior  Description  Define the ABC's of diabetes; identify appropriate screenings, schedule and personal plan for screenings. Outcome: Progressing Towards Goal  Goal: *Using medications safely  Description  State effect of diabetes medications on diabetes; name diabetes medication taking, action and side effects. Outcome: Progressing Towards Goal  Goal: *Monitoring blood glucose, interpreting and using results  Description  Identify recommended blood glucose targets  and personal targets. Outcome: Progressing Towards Goal  Goal: *Prevention, detection, treatment of acute complications  Description  List symptoms of hyper- and hypoglycemia; describe how to treat low blood sugar and actions for lowering  high blood glucose level. Outcome: Progressing Towards Goal  Goal: *Prevention, detection and treatment of chronic complications  Description  Define the natural course of diabetes and describe the relationship of blood glucose levels to long term complications of diabetes.   Outcome: Progressing Towards Goal  Goal: *Developing strategies to address psychosocial issues  Description  Describe feelings about living with diabetes; identify support needed and support network  Outcome: Progressing Towards Goal  Goal: *Insulin pump training  Outcome: Progressing Towards Goal  Goal: *Sick day guidelines  Outcome: Progressing Towards Goal  Goal: *Patient Specific Goal (EDIT GOAL, INSERT TEXT)  Outcome: Progressing Towards Goal     Problem: Patient Education: Go to Patient Education Activity  Goal: Patient/Family Education  Outcome: Progressing Towards Goal     Problem: Pressure Injury - Risk of  Goal: *Prevention of pressure injury  Description  Document Stefan Scale and appropriate interventions in the flowsheet. Outcome: Progressing Towards Goal  Note: Pressure Injury Interventions:  Sensory Interventions: Keep linens dry and wrinkle-free, Minimize linen layers    Moisture Interventions: Absorbent underpads, Minimize layers    Activity Interventions: Increase time out of bed, Pressure redistribution bed/mattress(bed type)    Mobility Interventions: Pressure redistribution bed/mattress (bed type)    Nutrition Interventions: Document food/fluid/supplement intake, Offer support with meals,snacks and hydration                     Problem: Patient Education: Go to Patient Education Activity  Goal: Patient/Family Education  Outcome: Progressing Towards Goal     Problem: Falls - Risk of  Goal: *Absence of Falls  Description  Document Trent Fall Risk and appropriate interventions in the flowsheet.   Outcome: Progressing Towards Goal  Note: Fall Risk Interventions:  Mobility Interventions: Patient to call before getting OOB    Mentation Interventions: Door open when patient unattended, More frequent rounding, Update white board, Toileting rounds    Medication Interventions: Patient to call before getting OOB    Elimination Interventions: Call light in reach, Patient to call for help with toileting needs    History of Falls Interventions: Door open when patient unattended, Evaluate medications/consider consulting pharmacy         Problem: Patient Education: Go to Patient Education Activity  Goal: Patient/Family Education  Outcome: Progressing Towards Goal     Problem: Patient Education: Go to Patient Education Activity  Goal: Patient/Family Education  Outcome: Progressing Towards Goal     Problem: Patient Education: Go to Patient Education Activity  Goal: Patient/Family Education  Outcome: Progressing Towards Goal

## 2019-12-12 NOTE — PROGRESS NOTES
Occupational Therapy Note    Patient: Yuridia Wooten [de-identified]80 y.o. male)  Date: 12/12/2019  Diagnosis: Hypokalemia [E87.6]  Hypokalemia [E87.6] Hypokalemia      Precautions: Fall, Other (comment)(Enteric)  Chart, occupational therapy assessment, plan of care, and goals were reviewed. Occupational Therapy treatment attempted. Patient is unable to participate due to:  []  Nausea/vomiting  []  Eating  []  Pain  []  Pt lethargic  []  Off Unit  [x] Other:  Attempt x1 @ 1280, pt requesting to finish breakfast.     Will f/u later as schedule allows. Thank you.   COREY Martinez/L

## 2019-12-12 NOTE — PROGRESS NOTES
Problem: Discharge Planning  Goal: *Discharge to safe environment  Outcome: Resolved/Met      SNF    Pt accepted to [facility] for today. Pt/family aware & agreeable. Nursing made aware of above. Available as needed. Tricia QueenRN,ext [phone]. Communication to Patient/Family:  Met with patient and family and they are agreeable to the transition plan. The Plan for Transition of Care is related to the following treatment goals: rehab. The Patient and/or patient representative was provided with a choice of provider and agrees  with the discharge plan. Yes [x] No []    A Freedom of choice list was provided with basic dialogue that supports the patient's individualized plan of care/goals and shares the quality data associated with the providers. Yes [] No []    SNF/Rehab Transition:  Patient has been accepted to [facility] SNF/Rehab and meets criteria for admission. Patient will transported by nurse and expected to leave at this afternoon     Communication to SNF/Rehab:  Bedside RN, Liudmila Johnson, has been notified to update the transition plan to the facility and call report [phone]. Discharge information has been updated on the AVS. And communicated to facility via [employer], or CC link. Discharge instructions to be fax'd to facility at [facility] #). NA    Nursing Please include all hard scripts for controlled substances, med rec and dc summary, and AVS in packet. NA    Reviewed and confirmed with facility, [facility], can manage the patient care needs for the following:     Waleska Liao with (X) only those applicable:  Medication:  [x]Medications are available at the facility  []IV Antibiotics    []Controlled Substance  hard copies available sent.   []Weekly Labs    Equipment:  []CPAP/BiPAP  []Wound Vacuum  []Moran or Urinary Device  []PICC/Central Line  []Nebulizer  []Ventilator    Treatment:  []Isolation (for MRSA, VRE, etc.)  []Surgical Drain Management  []Tracheostomy Care  []Dressing Changes  []Dialysis with transportation  []PEG Care  []Oxygen  []Daily Weights for Heart Failure    Dietary:  [x]Any diet limitations  []Tube Feedings   []Total Parenteral Management (TPN)    Financial Resources:  []Medicaid Application Completed    []UAI Completed and copy given to pt/family  and copy given to pt/family  []A screening has previously been completed. []Level II Completed    [x] Private pay individual who will not become   financially eligible for Medicaid within 6 months from admission to a [address] [place] [address] facility. [] Individual refused to have screening conducted. []Medicaid Application Completed    []The screening denied because it was determined individual did not need/did not qualify for nursing facility level of care. [] Out of state residents seeking direct admission to a [address] facility. [] Individuals who are inpatients of an out of state hospital, or in state or out of state veterans/ hospital and seek direct admission to a [address] facility  [] Individuals who are pateints or residents of a state owned/operated facility that is licensed by Department of [employer] ([facility]) and seek direct admission to [address] [place] [address]  [] A screening not required for enrollment in [address] [address] [address] services as set out in [address] [place] [address] 21-  [] [facility]) staff shall perform screenings of the [facility] clients. Advanced Care Plan:  []Surrogate Decision Maker of Care  []POA  []Communicated Code Status and copy sent. Other:          Care Management Interventions  PCP Verified by CM: Yes(Dr. Dianne Queen, last seen about 1 month ago)  Palliative Care Criteria Met (RRAT>21 & CHF Dx)?: No  Mode of Transport at Discharge: Self  Transition of Care Consult (CM Consult): SNF  Partner SNF: Yes  Discharge Durable Medical Equipment: No  Physical Therapy Consult: Yes  Occupational Therapy Consult: Yes  Speech Therapy Consult: No  Current Support Network:  Other(daughter lives with pt)  Confirm Follow Up Transport: Family  Plan discussed with Pt/Family/Caregiver: Yes  Freedom of Choice Offered: Yes  Discharge Location  Discharge Placement: Skilled nursing facility(TCC)

## 2019-12-12 NOTE — DIABETES MGMT
Glycemic Control Plan of Care    Admitted 12/8/19 with hypokalemia - confirmed he takes Januvia at home. Your A1C  was   Lab Results   Component Value Date/Time    Hemoglobin A1c 7.3 (H) 12/07/2019 11:10 AM   .    His nurse reports a BG of 157 at lunch time - corrective lispro, normal insulin sensitivity, ordered.   He plans to d/c to [facility] later today -       Mehrdad Padgett MPH RN  Pager [phone]  Office [phone]

## 2019-12-13 LAB
GLUCOSE BLD STRIP.AUTO-MCNC: 109 MG/DL (ref 70–110)
GLUCOSE BLD STRIP.AUTO-MCNC: 109 MG/DL (ref 70–110)
GLUCOSE BLD STRIP.AUTO-MCNC: 113 MG/DL (ref 70–110)
GLUCOSE BLD STRIP.AUTO-MCNC: 117 MG/DL (ref 70–110)

## 2019-12-13 PROCEDURE — 74011250637 HC RX REV CODE- 250/637: Performed by: INTERNAL MEDICINE

## 2019-12-13 PROCEDURE — 82962 GLUCOSE BLOOD TEST: CPT

## 2019-12-13 RX ORDER — ADHESIVE BANDAGE
30 BANDAGE TOPICAL DAILY PRN
Status: DISCONTINUED | OUTPATIENT
Start: 2019-12-13 | End: 2019-12-20 | Stop reason: HOSPADM

## 2019-12-13 RX ADMIN — Medication 2 TABLET: at 09:22

## 2019-12-13 RX ADMIN — CARBIDOPA AND LEVODOPA 2 TABLET: 25; 100 TABLET, EXTENDED RELEASE ORAL at 09:22

## 2019-12-13 RX ADMIN — PENTOXIFYLLINE 400 MG: 400 TABLET, EXTENDED RELEASE ORAL at 17:10

## 2019-12-13 RX ADMIN — ISOSORBIDE MONONITRATE 60 MG: 30 TABLET, EXTENDED RELEASE ORAL at 09:22

## 2019-12-13 RX ADMIN — METOPROLOL TARTRATE 25 MG: 25 TABLET, FILM COATED ORAL at 17:10

## 2019-12-13 RX ADMIN — FINASTERIDE 5 MG: 5 TABLET, FILM COATED ORAL at 09:22

## 2019-12-13 RX ADMIN — TAMSULOSIN HYDROCHLORIDE 0.4 MG: 0.4 CAPSULE ORAL at 17:10

## 2019-12-13 RX ADMIN — PENTOXIFYLLINE 400 MG: 400 TABLET, EXTENDED RELEASE ORAL at 09:22

## 2019-12-13 RX ADMIN — SIMVASTATIN 20 MG: 20 TABLET, FILM COATED ORAL at 22:00

## 2019-12-13 RX ADMIN — CARBIDOPA AND LEVODOPA 2 TABLET: 25; 100 TABLET, EXTENDED RELEASE ORAL at 22:00

## 2019-12-13 RX ADMIN — GABAPENTIN 800 MG: 100 CAPSULE ORAL at 17:12

## 2019-12-13 RX ADMIN — POTASSIUM CHLORIDE 10 MEQ: 10 TABLET, EXTENDED RELEASE ORAL at 09:22

## 2019-12-13 RX ADMIN — METOPROLOL TARTRATE 25 MG: 25 TABLET, FILM COATED ORAL at 09:22

## 2019-12-13 RX ADMIN — PENTOXIFYLLINE 400 MG: 400 TABLET, EXTENDED RELEASE ORAL at 12:18

## 2019-12-13 RX ADMIN — GLYCOPYRROLATE 1 MG: 1 TABLET ORAL at 17:10

## 2019-12-13 RX ADMIN — DOCUSATE SODIUM 100 MG: 100 CAPSULE, LIQUID FILLED ORAL at 09:22

## 2019-12-13 RX ADMIN — Medication 2 TABLET: at 17:10

## 2019-12-13 RX ADMIN — GLYCOPYRROLATE 1 MG: 1 TABLET ORAL at 09:22

## 2019-12-13 RX ADMIN — SITAGLIPTIN 50 MG: 50 TABLET, FILM COATED ORAL at 09:22

## 2019-12-13 RX ADMIN — MAGNESIUM HYDROXIDE 30 ML: 400 SUSPENSION ORAL at 06:04

## 2019-12-13 RX ADMIN — CARBIDOPA AND LEVODOPA 2 TABLET: 25; 100 TABLET, EXTENDED RELEASE ORAL at 17:10

## 2019-12-13 RX ADMIN — GABAPENTIN 800 MG: 100 CAPSULE ORAL at 09:22

## 2019-12-13 RX ADMIN — ASPIRIN 81 MG 81 MG: 81 TABLET ORAL at 09:23

## 2019-12-13 NOTE — PROGRESS NOTES
conducted an initial consultation and Spiritual Assessment for Ganga Myles, who is a 80 y.o.,male. Patients Primary Language is: [place]. According to the patients EMR Moravian Affiliation is: No preference. The reason the Patient came to the hospital is:   Patient Active Problem List    Diagnosis Date Noted    UTI (urinary tract infection) 12/07/2019    Hypokalemia 12/07/2019    Type 2 diabetes mellitus with diabetic neuropathy ([address] 11/05/2018    Benign prostatic hyperplasia with urinary retention     CAD (coronary artery disease)     Colon cancer (HCC)     Diabetes (HCC)     High cholesterol     History of colonoscopy     HLD (hyperlipidemia)     Osteoarthritis     Parkinson's disease ([address]     Shortness of breath     Stroke ([address]     Hypertension     Benign prostate hyperplasia 10/25/2017    Partial small bowel obstruction ([address] 12/21/2015    BPH (benign prostatic hyperplasia) 06/03/2014    Urinary retention 06/03/2014    NSTEMI (non-ST elevated myocardial infarction) ([address] 05/08/2014    Acute coronary syndrome ([address] 05/06/2014    Acute renal insufficiency 05/06/2014    Coronary atherosclerosis of native coronary artery 05/06/2014    Acute bronchitis 05/05/2014    Chest pain, unspecified 05/05/2014    HTN (hypertension) 05/05/2014    Parkinson disease ([address] 05/05/2014    Pure hypercholesterolemia 05/05/2014    DM (diabetes mellitus) ([address] 05/05/2014        The  provided the following Interventions:  Initiated a relationship of care and support. Explored issues of hayley, spirituality and/or Islam needs while hospitalized. Listened empathically. Provided chaplaincy education. Provided information about Spiritual Care Services. Offered prayer and assurance of continued prayers on patient's behalf. Chart reviewed.     The following outcomes were achieved:  Patient shared some information about their medical narrative and spiritual journey/beliefs. Patient processed feeling about current hospitalization. Patient expressed gratitude for the 's visit. Assessment:  Patient did not indicate any spiritual or Baptist issues which require Spiritual Care Services interventions at this time. Patient does not have any Baptist/cultural needs that will affect patients preferences in health care. Plan:  Chaplains will continue to follow and will provide pastoral care on an as needed or requested basis.  recommends bedside caregivers page  on duty if patient shows signs of acute spiritual or emotional distress.     [facility]   Staff [address] [place] [address]   [phone]

## 2019-12-13 NOTE — PROGRESS NOTES
NUTRITION INITIAL ASSESSMENT/PLAN OF CARE [facility]     RECOMMENDATIONS:   1. Consistent CHO Soft Solid Diet (Chopped meats) per SLP Tray Set Up/Feeding Assist  2. Ensure TID and HS snack for additional kcal/protein  3. Monitor labs, weight and PO intake   4. RD to follow     GOALS:   1. PO intake meets >75% of protein/calorie needs by 12/20  2. Weight Maintenance (+/- 1-2 lb) by 12/20      ASSESSMENT:   Wt status is classified as overweight per Body mass index is 25.3 kg/m². Fair PO intake overall per vitals (%) during hospital admission. Labs noted. BG range (109-156) over the past 24 hours; A1c (7.3%). Pt w/ elevated BUN. Nutrition recommendations listed. RD to follow. Nutrition Diagnoses:   Problems chewing/swallowing related to moderate severe oropharyngeal dysphagia as evidenced by SLP ordered diet modification. SUBJECTIVE/OBJECTIVE:    Pt transferred from [address] [place] [address] on 12/12/2019 after being admitted for hypokalemia and UTI. Appetite/PO intake variable during hospital admissions  SLP following: requesting diet change (12/12) Pt presents with moderate severe oropharyngeal dysphagia; recommendations for [facility]/chopped meats to decrease risk of choking and increase mastication efficiency. Pt seen in room this afternoon in bed. Used bed scale during visit to obtain weight 191.8 lb. Stated UBW ~198 lb and stable weight  PTA. Reports no changes in his appetite and has been consuming 3 meals per day and 3 Boost per day at home. Does report he consumes softer textures at home. Only observed <25% of lunch tray consumed after SLP working with patient. Stated he has been having loose stools for the past 3 weeks now; noted Imodium ordered daily. However last BM documented on (12/9) per I/Os. Placed orders for Soft Solid chopped meats, Ensure TID and HS snack. Menu in room and encouraged to implement preferences with dietary. Will continue to monitor.      Information Obtained from:    [x] Chart Review   [x] Patient   [] Family/Caregiver   [] Nurse/Physician   [x] Interdisciplinary Meeting/Rounds      Diet: Consistent CHO Diet  Medications: [x] Reviewed  Humalog, Imdur, Floranex, Lopressor, Zocor, Januvia     Allergies: [x] Reviewed   Patient Active Problem List   Diagnosis Code    Acute bronchitis J20.9    Chest pain, unspecified R07.9    HTN (hypertension) I10    Parkinson disease ([facility] G20    Pure hypercholesterolemia E78.00    DM (diabetes mellitus) ([facility] E11.9    Acute coronary syndrome (HCC) I24.9    Acute renal insufficiency N28.9    Coronary atherosclerosis of native coronary artery I25.10    BPH (benign prostatic hyperplasia) N40.0    Urinary retention R33.9    Partial small bowel obstruction ([facility]) K56.600    Benign prostate hyperplasia N40.0    NSTEMI (non-ST elevated myocardial infarction) ([facility]) I21.4    Benign prostatic hyperplasia with urinary retention N40.1, R33.8    CAD (coronary artery disease) I25.10    Colon cancer ([facility]) C18.9    Diabetes ([facility]) E11.9    High cholesterol E78.00    History of colonoscopy Z98.890    HLD (hyperlipidemia) E78.5    Osteoarthritis M19.90    Parkinson's disease ([address] G20    Shortness of breath R06.02    Stroke ([facility] I63.9    Hypertension I10    Type 2 diabetes mellitus with diabetic neuropathy ([facility]) E11.40    UTI (urinary tract infection) N39.0    Hypokalemia E87.6       Past Medical History:   Diagnosis Date    Benign prostatic hyperplasia with urinary retention     BPH (benign prostatic hyperplasia)     CAD (coronary artery disease)     Severe 3 vessel ( Cath 05/2014) Medical management    Colon cancer ([address]     CVA (cerebral infarction)     x2    Diabetes ([address]     High cholesterol     History of colonoscopy     HLD (hyperlipidemia)     Hypertension     Osteoarthritis     Parkinson's disease ([address]     Shortness of breath     Stroke ([address]     Tuberculosis of lung     Urinary retention       Labs:    Lab Results   Component Value Date/Time    Sodium 140 12/12/2019 04:25 AM    Potassium 4.1 12/12/2019 04:25 AM    Chloride 107 12/12/2019 04:25 AM    CO2 26 12/12/2019 04:25 AM    Anion gap 7 12/12/2019 04:25 AM    Glucose 96 12/12/2019 04:25 AM    BUN 21 (H) 12/12/2019 04:25 AM    Creatinine 1.04 12/12/2019 04:25 AM    Calcium 8.5 12/12/2019 04:25 AM    Magnesium 1.9 12/12/2019 04:25 AM    Phosphorus 3.3 12/12/2019 04:25 AM    Albumin 3.2 (L) 12/07/2019 11:10 AM     Lab Results   Component Value Date/Time    GLUCPOC 109 12/13/2019 12:18 PM    GLUCPOC 109 12/13/2019 05:14 AM    GLUCPOC 116 (H) 12/12/2019 10:11 PM       Anthropometrics: BMI (calculated): 25.3  Last 3 Recorded Weights in this Encounter    12/13/19 1522   Weight: 87 kg (191 lb 12.8 oz)      Ht Readings from Last 1 Encounters:   12/13/19 6' 1\" (1.854 m)       Documented Weight History:  Weight Metrics 12/13/2019 12/12/2019 8/27/2019 8/14/2019 4/7/2019 2/13/2019 12/13/2018   Weight 191 lb 12.8 oz 186 lb 15.2 oz - 151 lb 151 lb 198 lb 198 lb   BMI 25.3 kg/m2 24.67 kg/m2 21.67 kg/m2 21.67 kg/m2 21.67 kg/m2 26.12 kg/m2 26.12 kg/m2       No data found. IBW: 184 lb %IBW: 104% UBW: 198 lb   [] Weight Loss [] Weight Gain [x] Weight Stable    Estimated Nutrition Needs: [x] MSJ x 1.2-1.3 [] Other:  Calories: 1028-2120 kcal Based on:   [x] Actual BW    Protein:    g Based on:   [x] Actual BW x 1.0-1.2 gm/kg    Fluid:       8136-6115 ml Based on:   [x] 1 mL/kcal     [x] No Cultural, Bahai or ethnic dietary need identified.     [] Cultural, Bahai and ethnic food preferences identified and addressed     Wt Status:  [] Normal (18.6 - 24.9) [] Underweight (<18.5) [x] Overweight (25 - 29.9) [] Mild Obesity (30 - 34.9)  [] Moderate Obesity (35 - 39.9) [] Morbid Obesity (40+)       Nutrition Problems Identified:   [x] Suboptimal PO intake (Variable)  [] Food Allergies  [x] Difficulty chewing/swallowing/poor dentition  [] Constipation/Diarrhea (Last BM on 12/9; bowel regimen in place)  [] Nausea/Vomiting   [] None  [] Other:     Plan:   [x] Therapeutic Diet  [x]  Obtained/adjusted food preferences/tolerances and/or snacks options   [x]  Supplements added   [x] SLP following for feeding techniques  [x]  HS snack added   [x]  Modify diet texture   []  Modify diet for food allergies   []  Educate patient   []  Assist with menu selection   [x]  Monitor PO intake on meal rounds   [x]  Continue inpatient monitoring and intervention   [x]  Participated in discharge planning/Interdisciplinary rounds/Team meetings   []  Other:     Education Needs:   [] Not appropriate for teaching at this time due to:   [x] Identified and addressed    Nutrition Monitoring and Evaluation:  [x] Continue ongoing monitoring and intervention  [] Other    Mirna Vieira

## 2019-12-13 NOTE — ROUTINE PROCESS
Bedside and Verbal shift change report given to [address] (oncoming nurse) by Santino Del Rio RN (offgoing nurse). Report included the following information Kardex and Recent Results.

## 2019-12-13 NOTE — ROUTINE PROCESS
Alert to person and place only. Up in wheelchair, slow to respond when questions asked, needs to process. Movements slow picking up water cup and taking oral medicatons. Call bell in reach, reinforced to call for hwelp do not get up by himself, he responded OK. Delon Mcbride

## 2019-12-13 NOTE — ROUTINE PROCESS
Sleeping on rounds.,resp regular. Call bell and urinal in reach for safety. Bill upper SR's up,HOB up. Denies pain.   Uses urinal.[id number]

## 2019-12-13 NOTE — ROUTINE PROCESS
Patient arrived at 1600 to unit via w/c with nurse and two daughters, NAD noted resp even and non labored, denies any pain or discomfort. Pt was oriented to room and unit, call bell and personal items placed within reach. 7-3 nurse entered medication orders and verified meds with NP. Skin assessment performed by staff, diet order sent to kitchen. Will continue to monitor.

## 2019-12-14 LAB
GLUCOSE BLD STRIP.AUTO-MCNC: 121 MG/DL (ref 70–110)
GLUCOSE BLD STRIP.AUTO-MCNC: 124 MG/DL (ref 70–110)
GLUCOSE BLD STRIP.AUTO-MCNC: 139 MG/DL (ref 70–110)
GLUCOSE BLD STRIP.AUTO-MCNC: 152 MG/DL (ref 70–110)

## 2019-12-14 PROCEDURE — 74011250637 HC RX REV CODE- 250/637: Performed by: INTERNAL MEDICINE

## 2019-12-14 PROCEDURE — 82962 GLUCOSE BLOOD TEST: CPT

## 2019-12-14 PROCEDURE — 74011636637 HC RX REV CODE- 636/637: Performed by: INTERNAL MEDICINE

## 2019-12-14 RX ADMIN — CARBIDOPA AND LEVODOPA 2 TABLET: 25; 100 TABLET, EXTENDED RELEASE ORAL at 17:13

## 2019-12-14 RX ADMIN — PENTOXIFYLLINE 400 MG: 400 TABLET, EXTENDED RELEASE ORAL at 09:27

## 2019-12-14 RX ADMIN — METOPROLOL TARTRATE 25 MG: 25 TABLET, FILM COATED ORAL at 17:14

## 2019-12-14 RX ADMIN — GLYCOPYRROLATE 1 MG: 1 TABLET ORAL at 09:27

## 2019-12-14 RX ADMIN — GABAPENTIN 800 MG: 100 CAPSULE ORAL at 17:13

## 2019-12-14 RX ADMIN — SITAGLIPTIN 50 MG: 50 TABLET, FILM COATED ORAL at 09:27

## 2019-12-14 RX ADMIN — LOPERAMIDE HYDROCHLORIDE 2 MG: 2 CAPSULE ORAL at 09:27

## 2019-12-14 RX ADMIN — GLYCOPYRROLATE 1 MG: 1 TABLET ORAL at 17:13

## 2019-12-14 RX ADMIN — METOPROLOL TARTRATE 25 MG: 25 TABLET, FILM COATED ORAL at 09:27

## 2019-12-14 RX ADMIN — ISOSORBIDE MONONITRATE 60 MG: 30 TABLET, EXTENDED RELEASE ORAL at 09:26

## 2019-12-14 RX ADMIN — SIMVASTATIN 20 MG: 20 TABLET, FILM COATED ORAL at 21:40

## 2019-12-14 RX ADMIN — TAMSULOSIN HYDROCHLORIDE 0.4 MG: 0.4 CAPSULE ORAL at 17:13

## 2019-12-14 RX ADMIN — ASPIRIN 81 MG 81 MG: 81 TABLET ORAL at 09:27

## 2019-12-14 RX ADMIN — PENTOXIFYLLINE 400 MG: 400 TABLET, EXTENDED RELEASE ORAL at 11:34

## 2019-12-14 RX ADMIN — Medication 2 TABLET: at 09:26

## 2019-12-14 RX ADMIN — CARBIDOPA AND LEVODOPA 2 TABLET: 25; 100 TABLET, EXTENDED RELEASE ORAL at 21:40

## 2019-12-14 RX ADMIN — INSULIN LISPRO 2 UNITS: 100 INJECTION, SOLUTION INTRAVENOUS; SUBCUTANEOUS at 21:41

## 2019-12-14 RX ADMIN — GABAPENTIN 800 MG: 100 CAPSULE ORAL at 09:26

## 2019-12-14 RX ADMIN — DOCUSATE SODIUM 100 MG: 100 CAPSULE, LIQUID FILLED ORAL at 09:27

## 2019-12-14 RX ADMIN — FINASTERIDE 5 MG: 5 TABLET, FILM COATED ORAL at 09:26

## 2019-12-14 RX ADMIN — POTASSIUM CHLORIDE 10 MEQ: 10 TABLET, EXTENDED RELEASE ORAL at 09:27

## 2019-12-14 RX ADMIN — CARBIDOPA AND LEVODOPA 2 TABLET: 25; 100 TABLET, EXTENDED RELEASE ORAL at 09:27

## 2019-12-14 RX ADMIN — PENTOXIFYLLINE 400 MG: 400 TABLET, EXTENDED RELEASE ORAL at 17:13

## 2019-12-14 RX ADMIN — Medication 2 TABLET: at 17:13

## 2019-12-14 NOTE — ROUTINE PROCESS
Alert to person and place only. Earlier Up in wheelchair, slow to respond when questions asked, needs to process. Movements slow picking up water cup and taking oral medicatons. Oral medication given with applesauce, patient tolerated well Call bell in reach, reinforced to call for hwelp do not get up by himself, he responded OK. Daughter and granddaughter at bedside earlier. currently in bed wanting to rest. Call bell in reach along with personal items.

## 2019-12-14 NOTE — PROGRESS NOTES
1954  Patient in bed with family at bedside. Patient's daughter reported that he had been having loose stools for a couple of weeks now and has a prescription medication for it at home. Advised family member to bring the medication so we can get an order from the doctor for it. Patient in bed HOB elevated. Had non-productive cough that sounded like he was choking when he was having dinner. Stayed with patient for a couple minutes and checked on him periodically and every time patient stated that he was okay and did not need anything. Patient stopped coughing when he finished his dinner. Since he is also taking a lot of medications, patient requested if he can have apple sauce with the medication during administration. 7010 patient has not peed yet, CNA sat him in the wheelchair and gave him water to drink to promote the urge to pee.     5508  Patient finally had a urine output.

## 2019-12-15 LAB
GLUCOSE BLD STRIP.AUTO-MCNC: 105 MG/DL (ref 70–110)
GLUCOSE BLD STRIP.AUTO-MCNC: 107 MG/DL (ref 70–110)
GLUCOSE BLD STRIP.AUTO-MCNC: 119 MG/DL (ref 70–110)
GLUCOSE BLD STRIP.AUTO-MCNC: 159 MG/DL (ref 70–110)

## 2019-12-15 PROCEDURE — 82962 GLUCOSE BLOOD TEST: CPT

## 2019-12-15 PROCEDURE — 74011636637 HC RX REV CODE- 636/637: Performed by: INTERNAL MEDICINE

## 2019-12-15 PROCEDURE — 74011250637 HC RX REV CODE- 250/637: Performed by: INTERNAL MEDICINE

## 2019-12-15 RX ADMIN — GABAPENTIN 800 MG: 100 CAPSULE ORAL at 08:14

## 2019-12-15 RX ADMIN — ASPIRIN 81 MG 81 MG: 81 TABLET ORAL at 08:15

## 2019-12-15 RX ADMIN — Medication 2 TABLET: at 17:03

## 2019-12-15 RX ADMIN — ISOSORBIDE MONONITRATE 60 MG: 30 TABLET, EXTENDED RELEASE ORAL at 08:14

## 2019-12-15 RX ADMIN — Medication 2 TABLET: at 08:14

## 2019-12-15 RX ADMIN — INSULIN LISPRO 2 UNITS: 100 INJECTION, SOLUTION INTRAVENOUS; SUBCUTANEOUS at 11:43

## 2019-12-15 RX ADMIN — CARBIDOPA AND LEVODOPA 2 TABLET: 25; 100 TABLET, EXTENDED RELEASE ORAL at 21:33

## 2019-12-15 RX ADMIN — METOPROLOL TARTRATE 25 MG: 25 TABLET, FILM COATED ORAL at 08:14

## 2019-12-15 RX ADMIN — PENTOXIFYLLINE 400 MG: 400 TABLET, EXTENDED RELEASE ORAL at 17:06

## 2019-12-15 RX ADMIN — SIMVASTATIN 20 MG: 20 TABLET, FILM COATED ORAL at 21:33

## 2019-12-15 RX ADMIN — GABAPENTIN 800 MG: 100 CAPSULE ORAL at 17:05

## 2019-12-15 RX ADMIN — SITAGLIPTIN 50 MG: 50 TABLET, FILM COATED ORAL at 08:14

## 2019-12-15 RX ADMIN — TAMSULOSIN HYDROCHLORIDE 0.4 MG: 0.4 CAPSULE ORAL at 17:06

## 2019-12-15 RX ADMIN — CARBIDOPA AND LEVODOPA 2 TABLET: 25; 100 TABLET, EXTENDED RELEASE ORAL at 17:04

## 2019-12-15 RX ADMIN — PENTOXIFYLLINE 400 MG: 400 TABLET, EXTENDED RELEASE ORAL at 08:14

## 2019-12-15 RX ADMIN — PENTOXIFYLLINE 400 MG: 400 TABLET, EXTENDED RELEASE ORAL at 11:43

## 2019-12-15 RX ADMIN — FINASTERIDE 5 MG: 5 TABLET, FILM COATED ORAL at 08:15

## 2019-12-15 RX ADMIN — LOPERAMIDE HYDROCHLORIDE 2 MG: 2 CAPSULE ORAL at 08:15

## 2019-12-15 RX ADMIN — CARBIDOPA AND LEVODOPA 2 TABLET: 25; 100 TABLET, EXTENDED RELEASE ORAL at 08:15

## 2019-12-15 RX ADMIN — METOPROLOL TARTRATE 25 MG: 25 TABLET, FILM COATED ORAL at 17:04

## 2019-12-15 RX ADMIN — DOCUSATE SODIUM 100 MG: 100 CAPSULE, LIQUID FILLED ORAL at 08:14

## 2019-12-15 RX ADMIN — GLYCOPYRROLATE 1 MG: 1 TABLET ORAL at 08:15

## 2019-12-15 RX ADMIN — GLYCOPYRROLATE 1 MG: 1 TABLET ORAL at 17:04

## 2019-12-15 RX ADMIN — POTASSIUM CHLORIDE 10 MEQ: 10 TABLET, EXTENDED RELEASE ORAL at 08:15

## 2019-12-15 NOTE — PROGRESS NOTES
1918  Patient in bed eating when I came in during rounds. HOB elevated. Food was all over on his gown. Straighten up patient in bed, cleaned him up and then changed his gown. Turned patient to his right side because he was always leaning on his left side. Patient had no complaints. Personal items and call light within reach.

## 2019-12-15 NOTE — ROUTINE PROCESS
Pt in bed resting quietly, alert and oriented to  person and place and when asked, pt responses slowly. Hob at 30, not in respiratory distress. side rails up x3, call bell and urinal within reach.

## 2019-12-16 LAB
ANION GAP SERPL CALC-SCNC: 7 MMOL/L (ref 3–18)
BASOPHILS # BLD: 0 K/UL (ref 0–0.1)
BASOPHILS NFR BLD: 0 % (ref 0–2)
BUN SERPL-MCNC: 22 MG/DL (ref 7–18)
BUN/CREAT SERPL: 18 (ref 12–20)
CALCIUM SERPL-MCNC: 8.6 MG/DL (ref 8.5–10.1)
CHLORIDE SERPL-SCNC: 105 MMOL/L (ref 100–111)
CO2 SERPL-SCNC: 28 MMOL/L (ref 21–32)
CREAT SERPL-MCNC: 1.19 MG/DL (ref 0.6–1.3)
DIFFERENTIAL METHOD BLD: ABNORMAL
EOSINOPHIL # BLD: 0.3 K/UL (ref 0–0.4)
EOSINOPHIL NFR BLD: 3 % (ref 0–5)
ERYTHROCYTE [DISTWIDTH] IN BLOOD BY AUTOMATED COUNT: 15.5 % (ref 11.6–14.5)
GLUCOSE BLD STRIP.AUTO-MCNC: 106 MG/DL (ref 70–110)
GLUCOSE BLD STRIP.AUTO-MCNC: 107 MG/DL (ref 70–110)
GLUCOSE BLD STRIP.AUTO-MCNC: 108 MG/DL (ref 70–110)
GLUCOSE BLD STRIP.AUTO-MCNC: 114 MG/DL (ref 70–110)
GLUCOSE SERPL-MCNC: 113 MG/DL (ref 74–99)
HCT VFR BLD AUTO: 35.7 % (ref 36–48)
HGB BLD-MCNC: 11.1 G/DL (ref 13–16)
LYMPHOCYTES # BLD: 2.3 K/UL (ref 0.9–3.6)
LYMPHOCYTES NFR BLD: 25 % (ref 21–52)
MCH RBC QN AUTO: 28.3 PG (ref 24–34)
MCHC RBC AUTO-ENTMCNC: 31.1 G/DL (ref 31–37)
MCV RBC AUTO: 91.1 FL (ref 74–97)
MONOCYTES # BLD: 0.6 K/UL (ref 0.05–1.2)
MONOCYTES NFR BLD: 7 % (ref 3–10)
NEUTS SEG # BLD: 5.8 K/UL (ref 1.8–8)
NEUTS SEG NFR BLD: 65 % (ref 40–73)
PLATELET # BLD AUTO: 255 K/UL (ref 135–420)
PMV BLD AUTO: 9.7 FL (ref 9.2–11.8)
POTASSIUM SERPL-SCNC: 3.4 MMOL/L (ref 3.5–5.5)
RBC # BLD AUTO: 3.92 M/UL (ref 4.7–5.5)
SODIUM SERPL-SCNC: 140 MMOL/L (ref 136–145)
WBC # BLD AUTO: 9.1 K/UL (ref 4.6–13.2)

## 2019-12-16 PROCEDURE — 82962 GLUCOSE BLOOD TEST: CPT

## 2019-12-16 PROCEDURE — 80048 BASIC METABOLIC PNL TOTAL CA: CPT

## 2019-12-16 PROCEDURE — 85025 COMPLETE CBC W/AUTO DIFF WBC: CPT

## 2019-12-16 PROCEDURE — 36415 COLL VENOUS BLD VENIPUNCTURE: CPT

## 2019-12-16 PROCEDURE — 74011250637 HC RX REV CODE- 250/637: Performed by: NURSE PRACTITIONER

## 2019-12-16 PROCEDURE — 74011250637 HC RX REV CODE- 250/637: Performed by: INTERNAL MEDICINE

## 2019-12-16 RX ORDER — POTASSIUM CHLORIDE 20 MEQ/1
20 TABLET, EXTENDED RELEASE ORAL DAILY
Status: DISCONTINUED | OUTPATIENT
Start: 2019-12-17 | End: 2019-12-19

## 2019-12-16 RX ORDER — MEGESTROL ACETATE 20 MG/1
20 TABLET ORAL DAILY
Status: DISCONTINUED | OUTPATIENT
Start: 2019-12-17 | End: 2019-12-20 | Stop reason: HOSPADM

## 2019-12-16 RX ORDER — MEGESTROL ACETATE 40 MG/1
20 TABLET ORAL DAILY
Status: DISCONTINUED | OUTPATIENT
Start: 2019-12-17 | End: 2019-12-16

## 2019-12-16 RX ADMIN — PENTOXIFYLLINE 400 MG: 400 TABLET, EXTENDED RELEASE ORAL at 08:48

## 2019-12-16 RX ADMIN — TAMSULOSIN HYDROCHLORIDE 0.4 MG: 0.4 CAPSULE ORAL at 17:27

## 2019-12-16 RX ADMIN — SIMVASTATIN 20 MG: 20 TABLET, FILM COATED ORAL at 22:00

## 2019-12-16 RX ADMIN — POTASSIUM CHLORIDE 10 MEQ: 10 TABLET, EXTENDED RELEASE ORAL at 08:47

## 2019-12-16 RX ADMIN — GLYCOPYRROLATE 1 MG: 1 TABLET ORAL at 08:47

## 2019-12-16 RX ADMIN — LOPERAMIDE HYDROCHLORIDE 2 MG: 2 CAPSULE ORAL at 08:47

## 2019-12-16 RX ADMIN — Medication 2 TABLET: at 08:46

## 2019-12-16 RX ADMIN — FINASTERIDE 5 MG: 5 TABLET, FILM COATED ORAL at 08:46

## 2019-12-16 RX ADMIN — CARBIDOPA AND LEVODOPA 2 TABLET: 25; 100 TABLET, EXTENDED RELEASE ORAL at 17:27

## 2019-12-16 RX ADMIN — GABAPENTIN 800 MG: 100 CAPSULE ORAL at 17:27

## 2019-12-16 RX ADMIN — CARBIDOPA AND LEVODOPA 2 TABLET: 25; 100 TABLET, EXTENDED RELEASE ORAL at 08:47

## 2019-12-16 RX ADMIN — Medication 2 TABLET: at 17:27

## 2019-12-16 RX ADMIN — PSYLLIUM HUSK 1 PACKET: 3.4 POWDER ORAL at 17:27

## 2019-12-16 RX ADMIN — PENTOXIFYLLINE 400 MG: 400 TABLET, EXTENDED RELEASE ORAL at 17:27

## 2019-12-16 RX ADMIN — PENTOXIFYLLINE 400 MG: 400 TABLET, EXTENDED RELEASE ORAL at 12:38

## 2019-12-16 RX ADMIN — ISOSORBIDE MONONITRATE 60 MG: 30 TABLET, EXTENDED RELEASE ORAL at 08:46

## 2019-12-16 RX ADMIN — GABAPENTIN 800 MG: 100 CAPSULE ORAL at 08:46

## 2019-12-16 RX ADMIN — METOPROLOL TARTRATE 25 MG: 25 TABLET, FILM COATED ORAL at 17:27

## 2019-12-16 RX ADMIN — ASPIRIN 81 MG 81 MG: 81 TABLET ORAL at 08:48

## 2019-12-16 RX ADMIN — CARBIDOPA AND LEVODOPA 2 TABLET: 25; 100 TABLET, EXTENDED RELEASE ORAL at 22:00

## 2019-12-16 RX ADMIN — SITAGLIPTIN 50 MG: 50 TABLET, FILM COATED ORAL at 08:48

## 2019-12-16 RX ADMIN — METOPROLOL TARTRATE 25 MG: 25 TABLET, FILM COATED ORAL at 09:00

## 2019-12-16 RX ADMIN — GLYCOPYRROLATE 1 MG: 1 TABLET ORAL at 17:27

## 2019-12-16 NOTE — PROGRESS NOTES
Pt in bed with visitor at the bedside, pt denies pain, HOB elevated, meds given whole with applesauce without difficulty, toileting assistance given as needed, call bell and personal items within reach

## 2019-12-16 NOTE — ROUTINE PROCESS
Patient out of room this am to therapy walked 99 feet with PT, ot set patient up for am grooming, patient washed and shaved face

## 2019-12-16 NOTE — ROUTINE PROCESS
Bedside and Verbal shift change report given to Beatrice Darnell lpn (oncoming nurse) by shanell medley (offgoing nurse). Report included the following information Kardex, MAR and Recent Results.

## 2019-12-16 NOTE — PROGRESS NOTES
I have reviewed this patient's current medication list and recent laboratory results. At this time, I do not suggest any drug therapy adjustments or additional laboratory monitoring. Thank you,  Shantanu MCRAE  Ph. M. S.  12/16/2019

## 2019-12-17 LAB
GLUCOSE BLD STRIP.AUTO-MCNC: 104 MG/DL (ref 70–110)
GLUCOSE BLD STRIP.AUTO-MCNC: 122 MG/DL (ref 70–110)
GLUCOSE BLD STRIP.AUTO-MCNC: 124 MG/DL (ref 70–110)
GLUCOSE BLD STRIP.AUTO-MCNC: 95 MG/DL (ref 70–110)

## 2019-12-17 PROCEDURE — 74011250637 HC RX REV CODE- 250/637: Performed by: NURSE PRACTITIONER

## 2019-12-17 PROCEDURE — 82962 GLUCOSE BLOOD TEST: CPT

## 2019-12-17 PROCEDURE — 77030012890

## 2019-12-17 PROCEDURE — 74011250637 HC RX REV CODE- 250/637: Performed by: INTERNAL MEDICINE

## 2019-12-17 RX ADMIN — PENTOXIFYLLINE 400 MG: 400 TABLET, EXTENDED RELEASE ORAL at 17:37

## 2019-12-17 RX ADMIN — TAMSULOSIN HYDROCHLORIDE 0.4 MG: 0.4 CAPSULE ORAL at 17:37

## 2019-12-17 RX ADMIN — ASPIRIN 81 MG 81 MG: 81 TABLET ORAL at 08:49

## 2019-12-17 RX ADMIN — POTASSIUM CHLORIDE 20 MEQ: 1500 TABLET, EXTENDED RELEASE ORAL at 08:49

## 2019-12-17 RX ADMIN — GLYCOPYRROLATE 1 MG: 1 TABLET ORAL at 08:49

## 2019-12-17 RX ADMIN — MEGESTROL ACETATE 20 MG: 20 TABLET ORAL at 08:48

## 2019-12-17 RX ADMIN — SITAGLIPTIN 50 MG: 50 TABLET, FILM COATED ORAL at 08:49

## 2019-12-17 RX ADMIN — CARBIDOPA AND LEVODOPA 2 TABLET: 25; 100 TABLET, EXTENDED RELEASE ORAL at 08:49

## 2019-12-17 RX ADMIN — PENTOXIFYLLINE 400 MG: 400 TABLET, EXTENDED RELEASE ORAL at 11:36

## 2019-12-17 RX ADMIN — PENTOXIFYLLINE 400 MG: 400 TABLET, EXTENDED RELEASE ORAL at 08:49

## 2019-12-17 RX ADMIN — Medication 2 TABLET: at 17:37

## 2019-12-17 RX ADMIN — SIMVASTATIN 20 MG: 20 TABLET, FILM COATED ORAL at 21:08

## 2019-12-17 RX ADMIN — Medication 2 TABLET: at 08:49

## 2019-12-17 RX ADMIN — CARBIDOPA AND LEVODOPA 2 TABLET: 25; 100 TABLET, EXTENDED RELEASE ORAL at 17:36

## 2019-12-17 RX ADMIN — METOPROLOL TARTRATE 25 MG: 25 TABLET, FILM COATED ORAL at 08:49

## 2019-12-17 RX ADMIN — ISOSORBIDE MONONITRATE 60 MG: 30 TABLET, EXTENDED RELEASE ORAL at 08:49

## 2019-12-17 RX ADMIN — GABAPENTIN 800 MG: 100 CAPSULE ORAL at 17:37

## 2019-12-17 RX ADMIN — GABAPENTIN 800 MG: 100 CAPSULE ORAL at 08:49

## 2019-12-17 RX ADMIN — PSYLLIUM HUSK 1 PACKET: 3.4 POWDER ORAL at 09:00

## 2019-12-17 RX ADMIN — FINASTERIDE 5 MG: 5 TABLET, FILM COATED ORAL at 08:49

## 2019-12-17 RX ADMIN — PSYLLIUM HUSK 1 PACKET: 3.4 POWDER ORAL at 17:37

## 2019-12-17 RX ADMIN — CARBIDOPA AND LEVODOPA 2 TABLET: 25; 100 TABLET, EXTENDED RELEASE ORAL at 21:08

## 2019-12-17 RX ADMIN — GLYCOPYRROLATE 1 MG: 1 TABLET ORAL at 17:38

## 2019-12-17 RX ADMIN — METOPROLOL TARTRATE 25 MG: 25 TABLET, FILM COATED ORAL at 17:37

## 2019-12-17 RX ADMIN — LOPERAMIDE HYDROCHLORIDE 2 MG: 2 CAPSULE ORAL at 08:49

## 2019-12-17 NOTE — ROUTINE PROCESS
Bedside and Verbal shift change report given to NIESHA Galvez RN (oncoming nurse) by Piotr Saez RN (offgoing nurse). Report included the following information SBAR, Kardex and Recent Results.

## 2019-12-17 NOTE — ROUTINE PROCESS
Asleep on rounds,resp regular. Call bell in reach for safety,Bill upper SR's up for safety. Denies pain,Bill knee high TEDS applied.

## 2019-12-17 NOTE — PROGRESS NOTES
[facility]    Daily progress Note    Patient: Odilon Mujica MRN: [id number]  CSN: [id number]    YOB: 1933  Age: 80 y.o. Sex: male    DOA: 12/12/2019 LOS:  LOS: 5 days                    Subjective:     CC: fall    Mr. Eunice Quevedo is a 80 yr old male patient. Patient was recently hospitalized from 12/7/2019-12/12/2019. Patient was seen in the ER for eval of generalized weakness, more than usual. Patient was found hypokalemia with k at 2.9 and  ,000 COLONIES/mL KLEBSIELLA PNEUMONIAE. Patient reports diarrhea times 3 weeks. Patient was tx with IVx abs and his K was replaced. Patient with hx of multiple falls possible from neuropathy. He was seen by PT/OT and they recommended PT/OT. Patient was accept at [facility]. On admission his K was 4.1. K now 3.4, he was given additional potassium, he remains asymatmtic. Patient report last Bm was y/d. He was started on probiotic and Metamucil. PAST MEDICAL Hx: Parkinson, DM type 2, UTI, HLD, HTN      PAST SURGICAL HISTORY:  Cardiac catheterization, GreenLight surgery.     ALLERGIES:  NO KNOWN DRUG     CURRENT MEDICATIONS:  Medication list reviewed.     SOCIAL HISTORY:  The patient , nonsmoker, nondrinker.     FAMILY HISTORY:  Positive for heart disease and hypertension.     REVIEW OF SYSTEMS:  No fever or chills. No weight loss or headache. No neck pain or sore throat. No chest pain or palpitation. No shortness of breath or cough. No nausea, vomiting, diarrhea, dysuria, or polyuria. No back pain or leg pain. No heat or cold intolerance.   No anxiety or depression.     Objective:      Visit Vitals  /72   Pulse 76   Temp 97.1 °F (36.2 °C)   Resp 18   Ht 6' 1\" (1.854 m)   Wt 90.7 kg (200 lb)   SpO2 95%   BMI 26.39 kg/m²       Physical Exam:  General appearance: alert, cooperative, no distress, appears stated age  HEENT: negative  Neck: supple, symmetrical, trachea midline, no adenopathy, thyroid: not enlarged, symmetric, no tenderness/mass/nodules, no carotid bruit and no JVD  Lungs: clear to auscultation bilaterally  Heart: regular rate and rhythm, S1, S2 normal, no murmur, click, rub or gallop  Abdomen: soft, non-tender. Bowel sounds normal. No masses,  no organomegaly  Pulses: 2+ and symmetric  Skin: Skin color, texture, turgor normal. No rashes or lesions      Intake and Output:  Current Shift:  No intake/output data recorded. Last three shifts:  No intake/output data recorded.     Recent Results (from the past 24 hour(s))   GLUCOSE, POC    Collection Time: 12/16/19  4:07 PM   Result Value Ref Range    Glucose (POC) 107 70 - 110 mg/dL   GLUCOSE, POC    Collection Time: 12/16/19  8:46 PM   Result Value Ref Range    Glucose (POC) 108 70 - 110 mg/dL   GLUCOSE, POC    Collection Time: 12/17/19  4:57 AM   Result Value Ref Range    Glucose (POC) 95 70 - 110 mg/dL   GLUCOSE, POC    Collection Time: 12/17/19 11:31 AM   Result Value Ref Range    Glucose (POC) 122 (H) 70 - 110 mg/dL         Current Facility-Administered Medications:     megestrol (MEGACE) tablet 20 mg, 20 mg, Oral, DAILY, Kevin Guy MD, 20 mg at 12/17/19 0848    psyllium husk-aspartame (METAMUCIL FIBER) packet 1 Packet, 1 Packet, Oral, BID, Sangeteha PAVON, NP, 1 Packet at 12/17/19 0900    potassium chloride (K-DUR, KLOR-CON) SR tablet 20 mEq, 20 mEq, Oral, DAILY, Eli Sanders NP, 20 mEq at 12/17/19 0849    magnesium hydroxide (MILK OF MAGNESIA) 400 mg/5 mL oral suspension 30 mL, 30 mL, Oral, DAILY PRN, Kevin Guy MD, 30 mL at 12/13/19 0604    pentoxifylline CR (TRENTAL) tablet 400 mg, 400 mg, Oral, TID WITH MEALS, Kevin Guy MD, 400 mg at 12/17/19 1136    loperamide (IMODIUM) capsule 2 mg, 2 mg, Oral, DAILY, Richard Cho MD, 2 mg at 12/17/19 0849    finasteride (PROSCAR) tablet 5 mg, 5 mg, Oral, DAILY, Richard Cho MD, 5 mg at 12/17/19 0849    glycopyrrolate (ROBINUL) tablet 1 mg, 1 mg, Oral, BID, Richard Cho MD, 1 mg at 12/17/19 0849    isosorbide mononitrate ER (IMDUR) tablet 60 mg, 60 mg, Oral, DAILY, Clay Desouza MD, 60 mg at 12/17/19 0849    ramipril (ALTACE) capsule 10 mg  (Patient Supplied), 10 mg, Oral, DAILY, Clay Desouza MD    SITagliptin (JANUVIA) tablet 50 mg, 50 mg, Oral, DAILY, Clay Desouza MD, 50 mg at 12/17/19 0849    gabapentin (NEURONTIN) capsule 800 mg, 800 mg, Oral, BID, Clay Desouza MD, 800 mg at 12/17/19 0849    simvastatin (ZOCOR) tablet 20 mg, 20 mg, Oral, QHS, Clay Desouza MD, 20 mg at 12/16/19 2200    tamsulosin (FLOMAX) capsule 0.4 mg, 0.4 mg, Oral, QPM, Clay Desouza MD, 0.4 mg at 12/16/19 1727    metoprolol tartrate (LOPRESSOR) tablet 25 mg, 25 mg, Oral, BID, Clay Desouza MD, 25 mg at 12/17/19 0849    aspirin chewable tablet 81 mg, 81 mg, Oral, DAILY, Clay Desouza MD, 81 mg at 12/17/19 0849    nitroglycerin (NITROSTAT) tablet 0.4 mg, 0.4 mg, SubLINGual, PRN, Clay Desouza MD    carbidopa-levodopa ER (SINEMET CR)  mg per tablet 2 Tab, 2 Tab, Oral, TID, Clay Desouza MD, 2 Tab at 12/17/19 0849    Lactobacillus Acidoph & Bulgar [facility]) tablet 2 Tab, 2 Tab, Oral, BID, Clay Desouza MD, 2 Tab at 12/17/19 0849    insulin lispro (HUMALOG) injection, , SubCUTAneous, AC&HS, Clay Desouza MD, Stopped at 12/15/19 1630    dextrose 10 % infusion 125-250 mL, 125-250 mL, IntraVENous, PRN, Clay Desouza MD    glucagon (GLUCAGEN) injection 1 mg, 1 mg, IntraMUSCular, PRN, Clay Desouza MD    glucose chewable tablet 16 g, 4 Tab, Oral, PRN, Clay Desouza MD    Lab Results   Component Value Date/Time    Glucose 113 (H) 12/16/2019 05:35 AM    Glucose 96 12/12/2019 04:25 AM    Glucose 101 (H) 12/11/2019 03:55 AM    Glucose 90 12/10/2019 03:58 AM    Glucose 97 12/09/2019 05:16 AM        Assessment/Plan   Hypokalemia, presumable from diarrhea./ K. K supplement as adjusted, will monitor labs.   Diarrhea none reported today, will monitor continue Probiotic. DM type : hgba1c 7.3 on 12/12, contineu SSI  Parkinson: continue PT/OT, continue Sinemet   BPH : continue Flomax + Procar  HLD; continue statin  HTN: family did bring in Altace, BP reviewed , sbp ranging  90-120s, will monitor closely.  Continue Imdur 60 mg +BB 25 mg po bid  CAD: continue ASA  Hypokalemia : k- 3.4 will monitor closely    Full code      Bradly Sheppard NP  12/17/2019, 12:27 PM

## 2019-12-17 NOTE — ROUTINE PROCESS
Bedside and Verbal shift change report given to General Bonner (oncoming nurse) by  shanell medley (offgoing nurse). Report included the following information Kardex, MAR and Recent Results.

## 2019-12-17 NOTE — H&P
[facility]  HISTORY AND PHYSICAL    Name:  Tova Bello  MR#:   [id number]  :  10/20/1933  ACCOUNT #:  [de-identified]  ADMIT DATE:  2019    REASON FOR ADMISSION:  UTI and hypokalemia. HISTORY OF PRESENT ILLNESS:  The patient is a pleasant 80-year-old male with past medical history significant Parkinson disease, hypertension, hyperlipidemia, diabetes mellitus type 2, who was admitted to the hospital due to generalized weakness and difficulty ambulating. During the ER workup, the patient was noted to have UTI and hypokalemia. The patient was admitted to the hospital.  Urine culture grew gram-negative rods. The patient was started on Rocephin with culture growing Klebsiella pneumoniae, pansensitive. The patient has hypokalemia and his potassium was replaced. The patient continued treatment for diabetes, hypertension, neuropathy, Parkinson disease and deconditioning while in the hospital.  The patient was seen in consultation by Physical Therapy and skilled nursing facility placement was recommended. At the time of my evaluation, the patient is alert, awake, in no apparent distress. The patient denies any chest pain, shortness of breath, or palpitation. PAST MEDICAL HISTORY:  Hypertension, hyperlipidemia, diabetes, hypokalemia, recent UTI, Parkinson disease, BPH, coronary artery disease, osteoarthritis. PAST SURGICAL HISTORY:  Cardiac catheterization, GreenLight surgery. ALLERGIES:  NO KNOWN DRUG ALLERGIES. CURRENT MEDICATIONS:  Medication list reviewed. SOCIAL HISTORY:  The patient , nonsmoker, nondrinker. FAMILY HISTORY:  Positive for heart disease and hypertension. REVIEW OF SYSTEMS:  No fever or chills. No weight loss or headache. No neck pain or sore throat. No chest pain or palpitation. No shortness of breath or cough. No nausea, vomiting, diarrhea, dysuria, or polyuria. No back pain or leg pain. No heat or cold intolerance.   No anxiety or depression. PHYSICAL EXAMINATION:  GENERAL:  This is a well-nourished, well-developed male, in no apparent distress. VITAL SIGNS:  Temperature 98, heart rate 73, blood pressure 102/57, respiratory rate is 18, satting 94%. HEENT:  Normocephalic, atraumatic. Sclerae anicteric. Oropharynx clear. Mucous membranes moist.  NECK:  No JVD or thyromegaly. HEART:  S1, S2.  Regular rate and rhythm. LUNGS:  Decreased breath sounds bilaterally. Occasional coarse upper respiratory sounds, clears with coughing. ABDOMEN:  Nontender, nondistended. Normoactive bowel sounds. EXTREMITIES:  No edema or clubbing. Motor strength is equal in all four extremities. NEUROLOGIC:  Cranial nerves are grossly intact. ASSESSMENT AND PLAN:  1. Urinary tract infection, treated with antibiotics. 2.  Hypokalemia, currently on potassium supplement. Continue followup routine labs. 3.  Parkinson disease, currently on Sinemet. 4.  Coronary artery disease. Continue with aspirin and Imdur. 5.  Diabetes mellitus. Continue current regimen in addition to sliding scale. 6.  Hyperlipidemia on Zocor. 7.  History of benign prostatic hyperplasia, on Proscar and Flomax. 8.  Physical deconditioning. Continue physical therapy/occupational therapy for gait and balance training and fall precautions. 9.  Depression screening negative. 10.  Advanced directive completed. 11.  Flu shot received.         MD QI Hodges/V_[id number]_I/  D:  12/16/2019 20:23  T:  12/16/2019 22:20  JOB #:  [id number]

## 2019-12-17 NOTE — PROGRESS NOTES
Patient in room resting in bed. His daughter came in to visit him today. She inquired about patient's loose stools, advised her that NADJA Benitez already looked at his chart and since he only had one loose stool today her only added fiber. Patient is already taking Imodium in the morning.

## 2019-12-18 LAB
GLUCOSE BLD STRIP.AUTO-MCNC: 100 MG/DL (ref 70–110)
GLUCOSE BLD STRIP.AUTO-MCNC: 121 MG/DL (ref 70–110)
GLUCOSE BLD STRIP.AUTO-MCNC: 127 MG/DL (ref 70–110)
GLUCOSE BLD STRIP.AUTO-MCNC: 146 MG/DL (ref 70–110)

## 2019-12-18 PROCEDURE — 74011250637 HC RX REV CODE- 250/637: Performed by: INTERNAL MEDICINE

## 2019-12-18 PROCEDURE — 74011250637 HC RX REV CODE- 250/637: Performed by: NURSE PRACTITIONER

## 2019-12-18 PROCEDURE — 82962 GLUCOSE BLOOD TEST: CPT

## 2019-12-18 RX ADMIN — CARBIDOPA AND LEVODOPA 2 TABLET: 25; 100 TABLET, EXTENDED RELEASE ORAL at 08:53

## 2019-12-18 RX ADMIN — PENTOXIFYLLINE 400 MG: 400 TABLET, EXTENDED RELEASE ORAL at 08:53

## 2019-12-18 RX ADMIN — ISOSORBIDE MONONITRATE 60 MG: 30 TABLET, EXTENDED RELEASE ORAL at 08:53

## 2019-12-18 RX ADMIN — GLYCOPYRROLATE 1 MG: 1 TABLET ORAL at 08:53

## 2019-12-18 RX ADMIN — POTASSIUM CHLORIDE 20 MEQ: 1500 TABLET, EXTENDED RELEASE ORAL at 08:53

## 2019-12-18 RX ADMIN — CARBIDOPA AND LEVODOPA 2 TABLET: 25; 100 TABLET, EXTENDED RELEASE ORAL at 17:09

## 2019-12-18 RX ADMIN — FINASTERIDE 5 MG: 5 TABLET, FILM COATED ORAL at 08:53

## 2019-12-18 RX ADMIN — MEGESTROL ACETATE 20 MG: 20 TABLET ORAL at 08:53

## 2019-12-18 RX ADMIN — ASPIRIN 81 MG 81 MG: 81 TABLET ORAL at 08:53

## 2019-12-18 RX ADMIN — CARBIDOPA AND LEVODOPA 2 TABLET: 25; 100 TABLET, EXTENDED RELEASE ORAL at 22:00

## 2019-12-18 RX ADMIN — Medication 2 TABLET: at 17:09

## 2019-12-18 RX ADMIN — METOPROLOL TARTRATE 25 MG: 25 TABLET, FILM COATED ORAL at 17:09

## 2019-12-18 RX ADMIN — GLYCOPYRROLATE 1 MG: 1 TABLET ORAL at 17:09

## 2019-12-18 RX ADMIN — PSYLLIUM HUSK 1 PACKET: 3.4 POWDER ORAL at 17:08

## 2019-12-18 RX ADMIN — PSYLLIUM HUSK 1 PACKET: 3.4 POWDER ORAL at 08:53

## 2019-12-18 RX ADMIN — SITAGLIPTIN 50 MG: 50 TABLET, FILM COATED ORAL at 08:53

## 2019-12-18 RX ADMIN — METOPROLOL TARTRATE 25 MG: 25 TABLET, FILM COATED ORAL at 08:53

## 2019-12-18 RX ADMIN — SIMVASTATIN 20 MG: 20 TABLET, FILM COATED ORAL at 22:00

## 2019-12-18 RX ADMIN — GABAPENTIN 800 MG: 100 CAPSULE ORAL at 17:09

## 2019-12-18 RX ADMIN — TAMSULOSIN HYDROCHLORIDE 0.4 MG: 0.4 CAPSULE ORAL at 17:09

## 2019-12-18 RX ADMIN — PENTOXIFYLLINE 400 MG: 400 TABLET, EXTENDED RELEASE ORAL at 11:34

## 2019-12-18 RX ADMIN — Medication 2 TABLET: at 08:53

## 2019-12-18 RX ADMIN — GABAPENTIN 800 MG: 100 CAPSULE ORAL at 08:53

## 2019-12-18 RX ADMIN — PENTOXIFYLLINE 400 MG: 400 TABLET, EXTENDED RELEASE ORAL at 17:09

## 2019-12-18 RX ADMIN — LOPERAMIDE HYDROCHLORIDE 2 MG: 2 CAPSULE ORAL at 08:53

## 2019-12-18 NOTE — PROGRESS NOTES
Pt resting quietly in bed, HOB elevated, no c/o pain and no visual signs of pain noted, toileting assistance given as needed, call bell and personal items within reach

## 2019-12-18 NOTE — ROUTINE PROCESS
Alert to person and place only. Earlier Up in wheelchair, slow to respond when questions asked, needs to process. Movements slow picking up water cup and taking oral medicatons. Difficulty chewing meat, called dietary requested chopped meat with gravy, Patrica Dung with speech reported patient could not tolerate the chopped meat, Dietician Adriana Pedraza made aware diet altered with ground meat with gravy. Oral medication given with applesauce, patient tolerated well Call bell in reach, reinforced to call for help do not get up by himself, he responded OK. Patient reported he wants to go home tomorrow, Discharge planner [address] [facility] [place] made aware.

## 2019-12-18 NOTE — ROUTINE PROCESS
Bedside and Verbal shift change report given to MIGUELINA Peterson (oncoming nurse) by CLEMENTINA Starr RN (offgoing nurse). Report included the following information SBAR, Kardex and MAR.

## 2019-12-19 ENCOUNTER — PATIENT OUTREACH (OUTPATIENT)
Dept: CASE MANAGEMENT | Age: 84
End: 2019-12-19

## 2019-12-19 LAB
ANION GAP SERPL CALC-SCNC: 7 MMOL/L (ref 3–18)
BASOPHILS # BLD: 0 K/UL (ref 0–0.1)
BASOPHILS NFR BLD: 0 % (ref 0–2)
BUN SERPL-MCNC: 26 MG/DL (ref 7–18)
BUN/CREAT SERPL: 25 (ref 12–20)
CALCIUM SERPL-MCNC: 8.4 MG/DL (ref 8.5–10.1)
CHLORIDE SERPL-SCNC: 106 MMOL/L (ref 100–111)
CO2 SERPL-SCNC: 27 MMOL/L (ref 21–32)
CREAT SERPL-MCNC: 1.06 MG/DL (ref 0.6–1.3)
DIFFERENTIAL METHOD BLD: ABNORMAL
EOSINOPHIL # BLD: 0.3 K/UL (ref 0–0.4)
EOSINOPHIL NFR BLD: 2 % (ref 0–5)
ERYTHROCYTE [DISTWIDTH] IN BLOOD BY AUTOMATED COUNT: 15.5 % (ref 11.6–14.5)
GLUCOSE BLD STRIP.AUTO-MCNC: 103 MG/DL (ref 70–110)
GLUCOSE BLD STRIP.AUTO-MCNC: 119 MG/DL (ref 70–110)
GLUCOSE BLD STRIP.AUTO-MCNC: 138 MG/DL (ref 70–110)
GLUCOSE BLD STRIP.AUTO-MCNC: 88 MG/DL (ref 70–110)
GLUCOSE SERPL-MCNC: 88 MG/DL (ref 74–99)
HCT VFR BLD AUTO: 33.9 % (ref 36–48)
HGB BLD-MCNC: 10.8 G/DL (ref 13–16)
LYMPHOCYTES # BLD: 2.4 K/UL (ref 0.9–3.6)
LYMPHOCYTES NFR BLD: 21 % (ref 21–52)
MCH RBC QN AUTO: 28.9 PG (ref 24–34)
MCHC RBC AUTO-ENTMCNC: 31.9 G/DL (ref 31–37)
MCV RBC AUTO: 90.6 FL (ref 74–97)
MONOCYTES # BLD: 0.8 K/UL (ref 0.05–1.2)
MONOCYTES NFR BLD: 7 % (ref 3–10)
NEUTS SEG # BLD: 7.9 K/UL (ref 1.8–8)
NEUTS SEG NFR BLD: 70 % (ref 40–73)
PLATELET # BLD AUTO: 240 K/UL (ref 135–420)
PMV BLD AUTO: 10 FL (ref 9.2–11.8)
POTASSIUM SERPL-SCNC: 3 MMOL/L (ref 3.5–5.5)
RBC # BLD AUTO: 3.74 M/UL (ref 4.7–5.5)
SODIUM SERPL-SCNC: 140 MMOL/L (ref 136–145)
WBC # BLD AUTO: 11.4 K/UL (ref 4.6–13.2)

## 2019-12-19 PROCEDURE — 74011250637 HC RX REV CODE- 250/637: Performed by: NURSE PRACTITIONER

## 2019-12-19 PROCEDURE — 82962 GLUCOSE BLOOD TEST: CPT

## 2019-12-19 PROCEDURE — 85025 COMPLETE CBC W/AUTO DIFF WBC: CPT

## 2019-12-19 PROCEDURE — 36415 COLL VENOUS BLD VENIPUNCTURE: CPT

## 2019-12-19 PROCEDURE — 74011250637 HC RX REV CODE- 250/637: Performed by: INTERNAL MEDICINE

## 2019-12-19 PROCEDURE — 80048 BASIC METABOLIC PNL TOTAL CA: CPT

## 2019-12-19 RX ORDER — POTASSIUM CHLORIDE 20 MEQ/1
20 TABLET, EXTENDED RELEASE ORAL 2 TIMES DAILY
Status: DISCONTINUED | OUTPATIENT
Start: 2019-12-19 | End: 2019-12-20 | Stop reason: HOSPADM

## 2019-12-19 RX ORDER — POTASSIUM CHLORIDE 20 MEQ/1
20 TABLET, EXTENDED RELEASE ORAL
Status: COMPLETED | OUTPATIENT
Start: 2019-12-19 | End: 2019-12-19

## 2019-12-19 RX ADMIN — GABAPENTIN 800 MG: 100 CAPSULE ORAL at 09:39

## 2019-12-19 RX ADMIN — METOPROLOL TARTRATE 25 MG: 25 TABLET, FILM COATED ORAL at 17:09

## 2019-12-19 RX ADMIN — PENTOXIFYLLINE 400 MG: 400 TABLET, EXTENDED RELEASE ORAL at 09:40

## 2019-12-19 RX ADMIN — MEGESTROL ACETATE 20 MG: 20 TABLET ORAL at 09:40

## 2019-12-19 RX ADMIN — PSYLLIUM HUSK 1 PACKET: 3.4 POWDER ORAL at 18:00

## 2019-12-19 RX ADMIN — TAMSULOSIN HYDROCHLORIDE 0.4 MG: 0.4 CAPSULE ORAL at 17:13

## 2019-12-19 RX ADMIN — SITAGLIPTIN 50 MG: 50 TABLET, FILM COATED ORAL at 09:40

## 2019-12-19 RX ADMIN — GABAPENTIN 800 MG: 100 CAPSULE ORAL at 17:11

## 2019-12-19 RX ADMIN — Medication 2 TABLET: at 17:12

## 2019-12-19 RX ADMIN — PSYLLIUM HUSK 1 PACKET: 3.4 POWDER ORAL at 09:39

## 2019-12-19 RX ADMIN — POTASSIUM CHLORIDE 20 MEQ: 1500 TABLET, EXTENDED RELEASE ORAL at 09:40

## 2019-12-19 RX ADMIN — ASPIRIN 81 MG 81 MG: 81 TABLET ORAL at 09:40

## 2019-12-19 RX ADMIN — LOPERAMIDE HYDROCHLORIDE 2 MG: 2 CAPSULE ORAL at 09:39

## 2019-12-19 RX ADMIN — FINASTERIDE 5 MG: 5 TABLET, FILM COATED ORAL at 09:40

## 2019-12-19 RX ADMIN — CARBIDOPA AND LEVODOPA 2 TABLET: 25; 100 TABLET, EXTENDED RELEASE ORAL at 09:40

## 2019-12-19 RX ADMIN — ISOSORBIDE MONONITRATE 60 MG: 30 TABLET, EXTENDED RELEASE ORAL at 09:40

## 2019-12-19 RX ADMIN — CARBIDOPA AND LEVODOPA 2 TABLET: 25; 100 TABLET, EXTENDED RELEASE ORAL at 21:25

## 2019-12-19 RX ADMIN — GLYCOPYRROLATE 1 MG: 1 TABLET ORAL at 09:00

## 2019-12-19 RX ADMIN — CARBIDOPA AND LEVODOPA 2 TABLET: 25; 100 TABLET, EXTENDED RELEASE ORAL at 16:44

## 2019-12-19 RX ADMIN — METOPROLOL TARTRATE 25 MG: 25 TABLET, FILM COATED ORAL at 09:40

## 2019-12-19 RX ADMIN — Medication 2 TABLET: at 09:39

## 2019-12-19 RX ADMIN — POTASSIUM CHLORIDE 20 MEQ: 20 TABLET, EXTENDED RELEASE ORAL at 18:00

## 2019-12-19 RX ADMIN — GLYCOPYRROLATE 1 MG: 1 TABLET ORAL at 17:13

## 2019-12-19 RX ADMIN — POTASSIUM CHLORIDE 20 MEQ: 1500 TABLET, EXTENDED RELEASE ORAL at 12:40

## 2019-12-19 RX ADMIN — PENTOXIFYLLINE 400 MG: 400 TABLET, EXTENDED RELEASE ORAL at 12:14

## 2019-12-19 RX ADMIN — SIMVASTATIN 20 MG: 20 TABLET, FILM COATED ORAL at 21:25

## 2019-12-19 RX ADMIN — PENTOXIFYLLINE 400 MG: 400 TABLET, EXTENDED RELEASE ORAL at 17:12

## 2019-12-19 NOTE — ROUTINE PROCESS
Pt in bed asleep,Hob at 30, not in respiratory distress. Edema 2+ noted on bilateral legs. side rails up x3, call bell and urinal within reach.

## 2019-12-19 NOTE — PROGRESS NOTES
Mr. Shayy Suero is requesting to discharge home. NADJA Benitez, spoke with patient and he has agreed to stay and discharge on Friday 12/20/2019. Referral made to [facility]. No HME required.

## 2019-12-19 NOTE — PROGRESS NOTES
NUTRITION FOLLOW-UP/PLAN OF CARE TCC     RECOMMENDATIONS:   1. Consistent CHO [facility] Soft Diet (ground meats) per SLP Tray Set Up/Feeding Assist  2. Ensure TID and HS snack for additional kcal/protein  3. Monitor labs, weight and PO intake   4. RD to follow     GOALS:   1. Progressing/Ongoing: PO intake meets >75% of protein/calorie needs by 12/26  2. Ongoing: Weight Maintenance (+/- 1-2 lb) by 12/26      ASSESSMENT:   Wt status is classified as overweight per Body mass index is 26.39 kg/m². Fair PO intake overall. Labs noted. BG range () over the past 24 hours; A1c (7.3%). Pt w/ hypokalemia and elevated BUN. Nutrition recommendations listed. RD to follow. SUBJECTIVE/OBJECTIVE:   (12/19): Appetite/PO intake is fair to good; 50-75% this past week per [address] [place] [address] Ave. Last BM on (12/16) per I/Os. Weights recorded have been variable with a noted 9 lb or 4.7% gain from admission weight, but is stable with UBW. SLP following: recommended change to diet texture to [facility] Soft with ground meats yesterday so adjusted orders. Pt seen in room eating lunch with visitor at bedside; observed >75% of meal consumed and still eating. Reports texture has been much better and he has been tolerating meals so far and consuming supplements. Encouraged to consume snack in evening and ask staff when he would like it. Will continue to monitor. (12/13): Pt transferred from [address] [place] [address] on 12/12/2019 after being admitted for hypokalemia and UTI. Appetite/PO intake variable during hospital admissions  SLP following: requesting diet change (12/12) Pt presents with moderate severe oropharyngeal dysphagia; recommendations for [facility] SOFT/chopped meats to decrease risk of choking and increase mastication efficiency. Pt seen in room this afternoon in bed. Used bed scale during visit to obtain weight 191.8 lb. Stated UBW ~198 lb and stable weight  PTA.  Reports no changes in his appetite and has been consuming 3 meals per day and 3 Boost per day at home. Does report he consumes softer textures at home. Only observed <25% of lunch tray consumed after SLP working with patient. Stated he has been having loose stools for the past 3 weeks now; noted Imodium ordered daily. However last BM documented on (12/9) per I/Os. Placed orders for Soft Solid chopped meats, Ensure TID and HS snack. Menu in room and encouraged to implement preferences with dietary. Will continue to monitor.      Information Obtained from:    [x] Chart Review   [x] Patient   [] Family/Caregiver   [] Nurse/Physician   [x] Interdisciplinary Meeting/Rounds      Diet: Consistent CHO Diet  Medications: [x] Reviewed  Humalog, Imdur, Floranex, Lopressor, Zocor, Januvia     Allergies: [x] Reviewed   Patient Active Problem List   Diagnosis Code    Acute bronchitis J20.9    Chest pain, unspecified R07.9    HTN (hypertension) I10    Parkinson disease ([facility] G20    Pure hypercholesterolemia E78.00    DM (diabetes mellitus) ([facility] E11.9    Acute coronary syndrome (HCC) I24.9    Acute renal insufficiency N28.9    Coronary atherosclerosis of native coronary artery I25.10    BPH (benign prostatic hyperplasia) N40.0    Urinary retention R33.9    Partial small bowel obstruction (HCC) K56.600    Benign prostate hyperplasia N40.0    NSTEMI (non-ST elevated myocardial infarction) ([facility] I21.4    Benign prostatic hyperplasia with urinary retention N40.1, R33.8    CAD (coronary artery disease) I25.10    Colon cancer (HCC) C18.9    Diabetes (HCC) E11.9    High cholesterol E78.00    History of colonoscopy Z98.890    HLD (hyperlipidemia) E78.5    Osteoarthritis M19.90    Parkinson's disease ([facility] G20    Shortness of breath R06.02    Stroke (HCC) I63.9    Hypertension I10    Type 2 diabetes mellitus with diabetic neuropathy (HCC) E11.40    UTI (urinary tract infection) N39.0    Hypokalemia E87.6       Past Medical History:   Diagnosis Date    Benign prostatic hyperplasia with urinary retention  BPH (benign prostatic hyperplasia)     CAD (coronary artery disease)     Severe 3 vessel ( Cath 05/2014) Medical management    Colon cancer [facility])     CVA (cerebral infarction)     x2    Diabetes ([facility]     High cholesterol     History of colonoscopy     HLD (hyperlipidemia)     Hypertension     Osteoarthritis     Parkinson's disease ([facility]     Shortness of breath     Stroke ([facility]     Tuberculosis of lung     Urinary retention       Labs:    Lab Results   Component Value Date/Time    Sodium 140 12/19/2019 07:35 AM    Potassium 3.0 (L) 12/19/2019 07:35 AM    Chloride 106 12/19/2019 07:35 AM    CO2 27 12/19/2019 07:35 AM    Anion gap 7 12/19/2019 07:35 AM    Glucose 88 12/19/2019 07:35 AM    BUN 26 (H) 12/19/2019 07:35 AM    Creatinine 1.06 12/19/2019 07:35 AM    Calcium 8.4 (L) 12/19/2019 07:35 AM    Magnesium 1.9 12/12/2019 04:25 AM    Phosphorus 3.3 12/12/2019 04:25 AM    Albumin 3.2 (L) 12/07/2019 11:10 AM     Lab Results   Component Value Date/Time    GLU 88 12/19/2019 07:35 AM    GLUCPOC 138 (H) 12/19/2019 12:11 PM    GLUCPOC 88 12/19/2019 04:15 AM    GLUCPOC 121 (H) 12/18/2019 08:42 PM       Anthropometrics: BMI (calculated): 26.4  Last 3 Recorded Weights in this Encounter    12/13/19 1522 12/16/19 1403   Weight: 87 kg (191 lb 12.8 oz) 90.7 kg (200 lb)      Ht Readings from Last 1 Encounters:   12/13/19 6' 1\" (1.854 m)       Documented Weight History:  Weight Metrics 12/16/2019 12/12/2019 8/27/2019 8/14/2019 4/7/2019 2/13/2019 12/13/2018   Weight 200 lb 186 lb 15.2 oz - 151 lb 151 lb 198 lb 198 lb   BMI 26.39 kg/m2 24.67 kg/m2 21.67 kg/m2 21.67 kg/m2 21.67 kg/m2 26.12 kg/m2 26.12 kg/m2       No data found.       UBW: 198 lb   [] Weight Loss  [] Weight Gain  [x] Weight Stable with UBW    Estimated Nutrition Needs: [x] MSJ x 1.2-1.3 [] Other:  Calories: 7074-0326 kcal Based on:   [x] Actual BW    Protein:    g Based on:   [x] Actual BW x 1.0-1.2 gm/kg    Fluid:       8444-7881 ml Based on: [x] 1 mL/kcal      Nutrition Problems Identified:   [x] Suboptimal PO intake (Fair)  [] Food Allergies  [x] Difficulty chewing/swallowing/poor dentition  [x] Constipation/Diarrhea (Last BM on 12/16; bowel regimen in place)  [] Nausea/Vomiting   [] None  [] Other:     Plan:   [x] Therapeutic Diet  [x]  Obtained/adjusted food preferences/tolerances and/or snacks options   [x]  Continue supplements added   [x] SLP following for feeding techniques  [x]  HS snack added   [x]  Modify diet texture   []  Modify diet for food allergies   []  Educate patient   []  Assist with menu selection   [x]  Monitor PO intake on meal rounds   [x]  Continue inpatient monitoring and intervention   [x]  Participated in discharge planning/Interdisciplinary rounds/Team meetings   []  Other:     Education Needs:   [] Not appropriate for teaching at this time due to:   [x] Identified and addressed    Nutrition Monitoring and Evaluation:  [x] Continue ongoing monitoring and intervention  [] Other    Pal Davis

## 2019-12-19 NOTE — ROUTINE PROCESS
Sat up in wheelchair, daughter at bedside, aware per RNP that discharge held till tomorrow due to abnormal potassium level. Tolerated oral medications with applesauce. No coughing or choking.

## 2019-12-19 NOTE — PROGRESS NOTES
1855  Patient in bed having dinner. Non-productive cough while eating noted. Checked status of patient he stated he is fine. Patient voiced out he want to go home tomorrow, I advised him that his potassium was low and so the doctor ordered potassium for him and then re-check it tomorrow morning. Patient stated understanding. 2309  Bedside, Verbal and Written shift change report given to Luís (oncoming nurse) by Abby Chase LPN (offgoing nurse).  Report included the following information SBAR, Kardex and [facility]

## 2019-12-19 NOTE — PROGRESS NOTES
1929  Patient in bed resting. Ate all of his food for dinner. Patient had family members visiting today. Patient's daughter, Floresita Vicente ([phone]) wanted to be notified if patient's discharge will push through tomorrow. The d/c date on board is 23rd but per patient he wants to go home tomorrow. I tried to speak with patient about waiting until Monday to avoid signing AMA. Patient agreed per our conversation that he will wait until Monday. Called daughter to give her update. 2312 Bedside, Verbal and Written shift change report given to Luís (oncoming nurse) by Steve Arias LPN (offgoing nurse). Report included the following information SBAR, Kardex and MAR.

## 2019-12-20 ENCOUNTER — HOME HEALTH ADMISSION (OUTPATIENT)
Dept: HOME HEALTH SERVICES | Facility: HOME HEALTH | Age: 84
End: 2019-12-20

## 2019-12-20 VITALS
DIASTOLIC BLOOD PRESSURE: 69 MMHG | TEMPERATURE: 97.6 F | HEIGHT: 73 IN | SYSTOLIC BLOOD PRESSURE: 121 MMHG | RESPIRATION RATE: 18 BRPM | HEART RATE: 75 BPM | WEIGHT: 200 LBS | BODY MASS INDEX: 26.51 KG/M2 | OXYGEN SATURATION: 95 %

## 2019-12-20 LAB
ERYTHROCYTE [DISTWIDTH] IN BLOOD BY AUTOMATED COUNT: 15.8 % (ref 11.6–14.5)
GLUCOSE BLD STRIP.AUTO-MCNC: 110 MG/DL (ref 70–110)
GLUCOSE BLD STRIP.AUTO-MCNC: 94 MG/DL (ref 70–110)
HCT VFR BLD AUTO: 34.8 % (ref 36–48)
HGB BLD-MCNC: 11 G/DL (ref 13–16)
MCH RBC QN AUTO: 28.9 PG (ref 24–34)
MCHC RBC AUTO-ENTMCNC: 31.6 G/DL (ref 31–37)
MCV RBC AUTO: 91.3 FL (ref 74–97)
PLATELET # BLD AUTO: 255 K/UL (ref 135–420)
PMV BLD AUTO: 10.1 FL (ref 9.2–11.8)
POTASSIUM SERPL-SCNC: 3.3 MMOL/L (ref 3.5–5.5)
RBC # BLD AUTO: 3.81 M/UL (ref 4.7–5.5)
WBC # BLD AUTO: 10.5 K/UL (ref 4.6–13.2)

## 2019-12-20 PROCEDURE — 74011250637 HC RX REV CODE- 250/637: Performed by: INTERNAL MEDICINE

## 2019-12-20 PROCEDURE — 74011250637 HC RX REV CODE- 250/637: Performed by: NURSE PRACTITIONER

## 2019-12-20 PROCEDURE — 84132 ASSAY OF SERUM POTASSIUM: CPT

## 2019-12-20 PROCEDURE — 85027 COMPLETE CBC AUTOMATED: CPT

## 2019-12-20 PROCEDURE — 36415 COLL VENOUS BLD VENIPUNCTURE: CPT

## 2019-12-20 PROCEDURE — 82962 GLUCOSE BLOOD TEST: CPT

## 2019-12-20 RX ORDER — POTASSIUM CHLORIDE 20 MEQ/1
20 TABLET, EXTENDED RELEASE ORAL 2 TIMES DAILY
Qty: 60 TAB | Refills: 0 | Status: SHIPPED | OUTPATIENT
Start: 2019-12-20

## 2019-12-20 RX ORDER — MEGESTROL ACETATE 20 MG/1
20 TABLET ORAL DAILY
Qty: 30 TAB | Refills: 0 | Status: SHIPPED
Start: 2019-12-21

## 2019-12-20 RX ORDER — POTASSIUM CHLORIDE 20 MEQ/1
20 TABLET, EXTENDED RELEASE ORAL
Status: COMPLETED | OUTPATIENT
Start: 2019-12-20 | End: 2019-12-20

## 2019-12-20 RX ADMIN — PSYLLIUM HUSK 1 PACKET: 3.4 POWDER ORAL at 08:45

## 2019-12-20 RX ADMIN — GLYCOPYRROLATE 1 MG: 1 TABLET ORAL at 08:45

## 2019-12-20 RX ADMIN — FINASTERIDE 5 MG: 5 TABLET, FILM COATED ORAL at 08:45

## 2019-12-20 RX ADMIN — MEGESTROL ACETATE 20 MG: 20 TABLET ORAL at 08:49

## 2019-12-20 RX ADMIN — ASPIRIN 81 MG 81 MG: 81 TABLET ORAL at 08:45

## 2019-12-20 RX ADMIN — GABAPENTIN 800 MG: 100 CAPSULE ORAL at 08:45

## 2019-12-20 RX ADMIN — LOPERAMIDE HYDROCHLORIDE 2 MG: 2 CAPSULE ORAL at 08:46

## 2019-12-20 RX ADMIN — METOPROLOL TARTRATE 25 MG: 25 TABLET, FILM COATED ORAL at 08:46

## 2019-12-20 RX ADMIN — ISOSORBIDE MONONITRATE 60 MG: 30 TABLET, EXTENDED RELEASE ORAL at 08:46

## 2019-12-20 RX ADMIN — SITAGLIPTIN 50 MG: 50 TABLET, FILM COATED ORAL at 08:46

## 2019-12-20 RX ADMIN — PENTOXIFYLLINE 400 MG: 400 TABLET, EXTENDED RELEASE ORAL at 12:00

## 2019-12-20 RX ADMIN — POTASSIUM CHLORIDE 20 MEQ: 1500 TABLET, EXTENDED RELEASE ORAL at 11:59

## 2019-12-20 RX ADMIN — PENTOXIFYLLINE 400 MG: 400 TABLET, EXTENDED RELEASE ORAL at 08:45

## 2019-12-20 RX ADMIN — Medication 2 TABLET: at 08:45

## 2019-12-20 RX ADMIN — POTASSIUM CHLORIDE 20 MEQ: 20 TABLET, EXTENDED RELEASE ORAL at 08:46

## 2019-12-20 RX ADMIN — CARBIDOPA AND LEVODOPA 2 TABLET: 25; 100 TABLET, EXTENDED RELEASE ORAL at 08:46

## 2019-12-20 NOTE — ROUTINE PROCESS
Bath given. Pt had a small amount of formed stool, pericare done. Cream applied on diaper rash and kept dry.

## 2019-12-20 NOTE — DISCHARGE SUMMARY
[place] [address] term Care  Discharge Summary    Patient: Nina Hanley MRN: [id number]  CSN: [id number]    YOB: 1933  Age: 80 y.o.   Sex: male    DOA: 12/12/2019 LOS:  LOS: 8 days   Discharge Date:      Admission Diagnoses: UTI  HYPOCALCEMIA    Discharge Diagnoses:    Problem List as of 12/20/2019 Date Reviewed: 8/14/2019          Codes Class Noted - Resolved    UTI (urinary tract infection) ICD-10-CM: N39.0  ICD-9-CM: 599.0  12/7/2019 - Present        Hypokalemia ICD-10-CM: E87.6  ICD-9-CM: 276.8  12/7/2019 - Present        Type 2 diabetes mellitus with diabetic neuropathy ([facility] ICD-10-CM: E11.40  ICD-9-CM: 250.60, 357.2  11/5/2018 - Present        Benign prostatic hyperplasia with urinary retention ICD-10-CM: N40.1, R33.8  ICD-9-CM: 600.01, 788.20  Unknown - Present        CAD (coronary artery disease) ICD-10-CM: I25.10  ICD-9-CM: 414.00  Unknown - Present    Overview Signed 11/7/2017  2:05 PM by Sirisha Best CMA     Severe 3 vessel ( Cath 05/2014) Medical management             Colon cancer [facility]) ICD-10-CM: C18.9  ICD-9-CM: 153.9  Unknown - Present        Diabetes ([facility] ICD-10-CM: E11.9  ICD-9-CM: 250.00  Unknown - Present        High cholesterol ICD-10-CM: E78.00  ICD-9-CM: 272.0  Unknown - Present        History of colonoscopy ICD-10-CM: Z98.890  ICD-9-CM: V45.89  Unknown - Present        HLD (hyperlipidemia) ICD-10-CM: E78.5  ICD-9-CM: 272.4  Unknown - Present        Osteoarthritis ICD-10-CM: M19.90  ICD-9-CM: 715.90  Unknown - Present        Parkinson's disease ([facility] ICD-10-CM: G20  ICD-9-CM: 332.0  Unknown - Present        Shortness of breath ICD-10-CM: R06.02  ICD-9-CM: 786.05  Unknown - Present        Stroke ([facility] ICD-10-CM: I63.9  ICD-9-CM: 434.91  Unknown - Present        Hypertension ICD-10-CM: I10  ICD-9-CM: 401.9  Unknown - Present        Benign prostate hyperplasia ICD-10-CM: N40.0  ICD-9-CM: 600.00  10/25/2017 - Present        Partial small bowel obstruction ([facility] ICD-10-CM: K56.600  ICD-9-CM: 560.9  12/21/2015 - Present        BPH (benign prostatic hyperplasia) ICD-10-CM: N40.0  ICD-9-CM: 600.00  6/3/2014 - Present        Urinary retention ICD-10-CM: R33.9  ICD-9-CM: 788.20  6/3/2014 - Present        NSTEMI (non-ST elevated myocardial infarction) ([facility] ICD-10-CM: I21.4  ICD-9-CM: 410.70  5/8/2014 - Present        Acute coronary syndrome (HCC) ICD-10-CM: I24.9  ICD-9-CM: 411.1  5/6/2014 - Present        Acute renal insufficiency ICD-10-CM: N28.9  ICD-9-CM: 593.9  5/6/2014 - Present        Coronary atherosclerosis of native coronary artery ICD-10-CM: I25.10  ICD-9-CM: 414.01  5/6/2014 - Present    Overview Signed 5/6/2014 10:21 AM by Niko Gomez MD     Three vessel, not a surgical candidate             Acute bronchitis ICD-10-CM: J20.9  ICD-9-CM: 466.0  5/5/2014 - Present    Overview Signed 5/5/2014  9:16 AM by Niko Gomez MD     With bronchospasm             Chest pain, unspecified ICD-10-CM: R07.9  ICD-9-CM: 786.50  5/5/2014 - Present        HTN (hypertension) ICD-10-CM: I10  ICD-9-CM: 401.9  5/5/2014 - Present        Parkinson disease ([facility] ICD-10-CM: G20  ICD-9-CM: 332.0  5/5/2014 - Present        Pure hypercholesterolemia ICD-10-CM: E78.00  ICD-9-CM: 272.0  5/5/2014 - Present        DM (diabetes mellitus) ([facility] ICD-10-CM: E11.9  ICD-9-CM: 250.00  5/5/2014 - Present              Discharge Condition: Good    Discharge To: Home with [address] Course: Mr. Torey Pérez is a 80 yr old male patient. Patient was recently hospitalized from 12/7/2019-12/12/2019. Patient was seen in the ER for eval of generalized weakness, more than usual. Patient was found hypokalemia with k at 2.9 and  ,000 [address] Patient reports diarrhea times 3 weeks. Patient was tx with IVx abs and his K was replaced. Patient with hx of multiple falls possible from neuropathy. He was seen by PT/OT and they recommended PT/OT. Patient was accept at [facility].  On admission his K was 4.1. K now 3.4, he was given additional potassium, he remains asymatmtic. Patient report last Bm was y/d. He was started on probiotic and Metamucil, discussed with nursing no stool today. Discussed with PT, he is at his prior level of functioning. Discussed with sister and patient at bedside, informed that K was   3.0, will give additional doses of k-dur, potassium this AM is 3.3, he remains asymptomatic. Patient was given total of 40 meq this AM, discussion with patient and his daughter, to monitor for frequent diarrhea greater than 4-5. They verbalized understanding. Today patient only have 1 loose stool this AM. Patient is clinical stable. No fever or chills. Patient is stable for discharge home with daughter.  Of note he wanted to be discharge home y/d, however he agree them to wait till today to see if potassium has improved.      PAST MEDICAL Hx: Parkinson, DM type 2, UTI, HLD, HTN        PAST SURGICAL HISTORY:  Cardiac catheterization, [employer] surgery.     ALLERGIES:  NO KNOWN DRUG     CURRENT MEDICATIONS:  Medication list reviewed.     SOCIAL HISTORY:  The patient , nonsmoker, nondrinker.     FAMILY HISTORY:  Positive for heart disease and hypertension.     REVIEW OF SYSTEMS:  No fever or chills.  No weight loss or headache.  No neck pain or sore throat.  No chest pain or palpitation.  No shortness of breath or cough.  No nausea, vomiting, diarrhea, dysuria, or polyuria.  No back pain or leg pain.  No heat or cold intolerance.  No anxiety or depression.        Objective:      Visit Vitals  /69 (BP Patient Position: Lying left side)   Pulse 71   Temp 97 °F (36.1 °C)   Resp 17   Ht 6' 1\" (1.854 m)   Wt 90.7 kg (200 lb)   SpO2 95%   BMI 26.39 kg/m²         Physical Exam:  General appearance: alert, cooperative, no distress, appears stated age  HEENT: negative  Neck: supple, symmetrical, trachea midline, no adenopathy, thyroid: not enlarged, symmetric, no tenderness/mass/nodules, no carotid bruit and no JVD  Lungs: clear to auscultation bilaterally  Heart: regular rate and rhythm, S1, S2 normal, no murmur, click, rub or gallop  Abdomen: soft, non-tender. Bowel sounds normal. No masses,  no organomegaly  Pulses: 2+ and symmetric  Skin: Skin color, texture, turgor normal. No rashes or lesions         Consults: None    Significant Diagnostic Studies: labs: see below     Discharge Medications:     Current Discharge Medication List      START taking these medications    Details   potassium chloride (K-DUR, KLOR-CON) 20 mEq tablet Take 1 Tab by mouth two (2) times a day. Qty: 60 Tab, Refills: 0      psyllium husk-aspartame (METAMUCIL FIBER) 3.4 gram pwpk packet Take 1 Packet by mouth two (2) times a day. Qty: 30 Packet, Refills: 0      megestrol (MEGACE) 20 mg tablet Take 1 Tab by mouth daily. Qty: 30 Tab, Refills: 0         CONTINUE these medications which have NOT CHANGED    Details   carbidopa-levodopa ER (SINEMET CR)  mg per tablet Take 1 Tab by mouth three (3) times daily. Qty: 1 Tab, Refills: 0      Lactobacillus Acidoph & Bulgar (FLORANEX) 1 million cell tab tablet Take 2 Tabs by mouth two (2) times a day for 14 days. Qty: 56 Tab, Refills: 0      finasteride (PROSCAR) 5 mg tablet Take 5 mg by mouth daily. isosorbide mononitrate ER (IMDUR) 60 mg CR tablet Take 1 Tab by mouth daily. Qty: 30 Tab, Refills: 6      docusate sodium (COLACE) 100 mg capsule Take 100 mg by mouth daily. gabapentin (NEURONTIN) 600 mg tablet Take 800 mg by mouth two (2) times a day. metoprolol (LOPRESSOR) 25 mg tablet Take 1 Tab by mouth two (2) times a day. Qty: 60 Tab, Refills: 11      pentoxifylline CR (TRENTAL) 400 mg CR tablet Take 400 mg by mouth daily. 1 tab daily      loperamide (IMODIUM A-D) 2 mg tablet Take 2 mg by mouth daily. Indications: diarrhea      nitroglycerin (NITROSTAT) 0.4 mg SL tablet 1 Tab by SubLINGual route as needed for Chest Pain.   Qty: 1 Bottle, Refills: 3      ramipril (ALTACE) 10 mg capsule Take 1 Cap by mouth daily. Qty: 30 Cap, Refills: 0      sitaGLIPtin (JANUVIA) 50 mg tablet Take 1 Tab by mouth daily. Qty: 30 Tab, Refills: 0      simvastatin (ZOCOR) 20 mg tablet Take 20 mg by mouth nightly. tamsulosin (FLOMAX) 0.4 mg capsule Take 1 capsule by mouth daily (after dinner). Qty: 90 capsule, Refills: 3    Associated Diagnoses: Urinary retention; BPH (benign prostatic hyperplasia)      aspirin 81 mg chewable tablet Take 1 Tab by mouth daily. Qty: 90 Tab, Refills: 3         STOP taking these medications       levoFLOXacin (LEVAQUIN) 750 mg tablet Comments:   Reason for Stopping:         potassium chloride SR (KLOR-CON 10) 10 mEq tablet Comments:   Reason for Stopping:         glycopyrrolate (ROBINUL) 1 mg tablet Comments:   Reason for Stopping:         carbidopa-levodopa (SINEMET-25/250)  mg per tablet Comments:   Reason for Stopping:               Results for Kayode Ku (MRN [id number]) as of 12/20/2019 12:25   Ref. Range 12/20/2019 04:11   WBC Latest Ref Range: 4.6 - 13.2 K/uL 10.5   RBC Latest Ref Range: 4.70 - 5.50 M/uL 3.81 (L)   HGB Latest Ref Range: 13.0 - 16.0 g/dL 11.0 (L)   HCT Latest Ref Range: 36.0 - 48.0 % 34.8 (L)   MCV Latest Ref Range: 74.0 - 97.0 FL 91.3   MCH Latest Ref Range: 24.0 - 34.0 PG 28.9   MCHC Latest Ref Range: 31.0 - 37.0 g/dL 31.6   RDW Latest Ref Range: 11.6 - 14.5 % 15.8 (H)   PLATELET Latest Ref Range: 135 - 420 K/uL 255   MPV Latest Ref Range: 9.2 - 11.8 FL 10.1   Potassium Latest Ref Range: 3.5 - 5.5 mmol/L 3.3 (L)     Results for Kayode Ku (MRN [id number]) as of 12/20/2019 12:25   Ref.  Range 12/20/2019 04:11   WBC Latest Ref Range: 4.6 - 13.2 K/uL 10.5   RBC Latest Ref Range: 4.70 - 5.50 M/uL 3.81 (L)   HGB Latest Ref Range: 13.0 - 16.0 g/dL 11.0 (L)   HCT Latest Ref Range: 36.0 - 48.0 % 34.8 (L)   MCV Latest Ref Range: 74.0 - 97.0 FL 91.3   MCH Latest Ref Range: 24.0 - 34.0 PG 28.9   MCHC Latest Ref Range: 31.0 - 37.0 g/dL 31.6 RDW Latest Ref Range: 11.6 - 14.5 % 15.8 (H)   PLATELET Latest Ref Range: 135 - 420 K/uL 255   MPV Latest Ref Range: 9.2 - 11.8 FL 10.1         ASSESSMENT/PLAN    Hypokalemia, presumable from diarrhea./ K. K supplement as adjusted, will monitor labs. Diarrhea none reported today, will monitor continue Probiotic. DM type : hgba1c 7.3 on 12/12, continue SSI  Parkinson: continue PT/OT, continue Sinemet   BPH : continue Flomax + Procar  HLD; continue statin  HTN: family did bring in Altace, BP reviewed , sbp ranging  90-120s, will monitor closely. Continue Imdur 60 mg +BB 25 mg po bid  CAD: continue ASA  Hypokalemia : Patient K is 3.3 he remains asymptomatic.  He was given 40 meq thsi AM, continue K-dur 20 meq po bid and to follow up with PCP on 12/23/2019    Activity: Activity as tolerated    Diet: Dysphagia Mechanical diet    Wound Care: Tedhosed on q AM and off q PM  Home with [facility] for PT/OT/Slikked nursing for medication management. I have discussed s/s of hypokalemia with family, I have discussed foods high in potassium. I informed them that if he is having diarrhea  4-5 episode day to call PCP or report to ER for eval. Patient and daughter verbalized understanding     Follow-up: I called his PCP and patient now has a earlier  follow up with  On 10/23/2019 at 200 Am with  Dr. Triston Dorsey at [facility] internal medicine for follow potassium check.      Jessica Hernandez NP  12/20/2019, 12:24 PM   .

## 2019-12-20 NOTE — PROGRESS NOTES
Patient and daughter at chair side with patient , education concerning potassium level and follow up with primary MD on Monday for repeat K+ lab draw to recheck potassium , daughter instructed on all potassium rich foods that patient can eat.

## 2019-12-20 NOTE — ROUTINE PROCESS
2330 Pt in bed resting quietly, alert and oriented to person place and situation, Hob at 30, not in respiratory distress. Edema 2+ noted on bilateral legs kept elevated on top of 1 pillow and with aime hose to be reapplied in AM. side rails up x3, call bell and urinal within reach.     Encouraged pt to drink water, pt took 4 sips and water and said \"I'd like to sleep now\".

## 2019-12-21 ENCOUNTER — HOME CARE VISIT (OUTPATIENT)
Dept: HOME HEALTH SERVICES | Facility: HOME HEALTH | Age: 84
End: 2019-12-21

## 2019-12-23 NOTE — PROGRESS NOTES
Community Care Team Documentation for Patient in [facility]     Patient discharged from [facility] [address] to [address] [facility], on 12/12/19. Hospital Discharge diagnosis:       UTI   Hypokalemia   Parkinson's Disease   HTN   DM    SNF Attending Provider:   Dr. Darinel Cunningham    Anticipated discharge date from SNF:  12/20/19 with [facility]. PCP : Jossie Sullivan NP    CTN:     [facility] Team rounds completed, updates provided by facility. RRAT:  High Risk            35       Total Score        3 Has Seen PCP in Last 6 Months (Yes=3, No=0)    32 Charlson Comorbidity Score (Age + Comorbid Conditions)        Criteria that do not apply:    . Living with Significant Other. Assisted Living. LTAC. SNF. or   Rehab    Patient Length of Stay (>5 days = 3)    IP Visits Last 12 Months (1-3=4, 4=9, >4=11)    Pt.  Coverage (Medicare=5 , Medicaid, or Self-Pay=4)        Active Ambulatory Problems     Diagnosis Date Noted    Acute bronchitis 05/05/2014    Chest pain, unspecified 05/05/2014    HTN (hypertension) 05/05/2014    Parkinson disease ([address] 05/05/2014    Pure hypercholesterolemia 05/05/2014    DM (diabetes mellitus) ([address] 05/05/2014    Acute coronary syndrome ([address] 05/06/2014    Acute renal insufficiency 05/06/2014    Coronary atherosclerosis of native coronary artery 05/06/2014    BPH (benign prostatic hyperplasia) 06/03/2014    Urinary retention 06/03/2014    Partial small bowel obstruction ([address] 12/21/2015    Benign prostate hyperplasia 10/25/2017    NSTEMI (non-ST elevated myocardial infarction) ([address] 05/08/2014    Benign prostatic hyperplasia with urinary retention     CAD (coronary artery disease)     Colon cancer ([address]     Diabetes ([address]     High cholesterol     History of colonoscopy     HLD (hyperlipidemia)     Osteoarthritis     Parkinson's disease ([address]     Shortness of breath     Stroke ([address]     Hypertension     Type 2 diabetes mellitus with diabetic neuropathy ([facility] 11/05/2018    UTI (urinary tract infection) 12/07/2019    Hypokalemia 12/07/2019     Resolved Ambulatory Problems     Diagnosis Date Noted    No Resolved Ambulatory Problems     Past Medical History:   Diagnosis Date    CVA (cerebral infarction)     Tuberculosis of lung

## 2020-02-19 ENCOUNTER — OFFICE VISIT (OUTPATIENT)
Dept: VASCULAR SURGERY | Age: 85
End: 2020-02-19

## 2020-02-19 VITALS
WEIGHT: 200 LBS | SYSTOLIC BLOOD PRESSURE: 116 MMHG | HEART RATE: 67 BPM | DIASTOLIC BLOOD PRESSURE: 80 MMHG | RESPIRATION RATE: 16 BRPM | OXYGEN SATURATION: 98 % | HEIGHT: 73 IN | BODY MASS INDEX: 26.51 KG/M2

## 2020-02-19 DIAGNOSIS — I73.9 PAD (PERIPHERAL ARTERY DISEASE) (HCC): Primary | ICD-10-CM

## 2020-02-19 DIAGNOSIS — Z99.3 WHEELCHAIR BOUND: ICD-10-CM

## 2020-02-19 DIAGNOSIS — E11.51 TYPE 2 DIABETES MELLITUS WITH DIABETIC PERIPHERAL ANGIOPATHY WITHOUT GANGRENE, UNSPECIFIED WHETHER LONG TERM INSULIN USE (HCC): ICD-10-CM

## 2020-02-19 DIAGNOSIS — G20 PARKINSON'S DISEASE (HCC): ICD-10-CM

## 2020-02-19 NOTE — PROGRESS NOTES
1. Have you been to an emergency room or urgent care clinic since your last visit? NO    Hospitalized since your last visit? If yes, where, when, and reason for visit? No  2. Have you seen or consulted any other health care providers outside of the [facility] since your last visit including any procedures, health maintenance items.  If yes, where, when and reason for visit? no

## 2020-02-19 NOTE — PROGRESS NOTES
Joseline Cazares    Chief Complaint   Patient presents with    Leg Pain       History and Physical    Joseline Cazares is a 80 y.o. gentleman with history of critical limb ischemia of the left lower extremity with history of ulceration of the left lower extremity. Patient does also have Parkinson's disease and presents to the office today with his daughter in follow-up. He is s/p left leg angiogram with atherectomy and balloon angioplasty of the superficial femoral artery and popliteal artery done November 2018. He is doing well overall. He does not ambulate much and is mostly wheelchair-bound so claudication is difficult to assess. He does report that he ambulates at home with a walker or cane and does pretty well. He is not complaining of any rest pain at this time. No temperature changes. He has no open wounds but does report that his Podiatrist removed the toenail to his right great toe recently. No skin changes reported. No fevers or chills.   Arterial studies done in our office today shows moderate arterial disease is noted within the right lower extremity at rest. Moderate to severe arterial insufficiency is noted within the left lower extremity at rest.     Past Medical History:   Diagnosis Date    Benign prostatic hyperplasia with urinary retention     BPH (benign prostatic hyperplasia)     CAD (coronary artery disease)     Severe 3 vessel ( Cath 05/2014) Medical management    Colon cancer [facility])     CVA (cerebral infarction)     x2    Diabetes ([address]     High cholesterol     History of colonoscopy     HLD (hyperlipidemia)     Hypertension     Osteoarthritis     Parkinson's disease ([address]     Shortness of breath     Stroke ([address]     Tuberculosis of lung     Urinary retention      Past Surgical History:   Procedure Laterality Date    HX HEART CATHETERIZATION      severe 3 vessel- medical management 2014    HX ORTHOPAEDIC      R leg surgery    HX UROLOGICAL  10/25/2017    [facility] Patient Active Problem List   Diagnosis Code    Acute bronchitis J20.9    Chest pain, unspecified R07.9    HTN (hypertension) I10    Parkinson disease ([facility] G20    Pure hypercholesterolemia E78.00    DM (diabetes mellitus) ([facility] E11.9    Acute coronary syndrome ([facility] I24.9    Acute renal insufficiency N28.9    Coronary atherosclerosis of native coronary artery I25.10    BPH (benign prostatic hyperplasia) N40.0    Urinary retention R33.9    Partial small bowel obstruction ([facility]) K56.600    Benign prostate hyperplasia N40.0    NSTEMI (non-ST elevated myocardial infarction) ([facility]) I21.4    Benign prostatic hyperplasia with urinary retention N40.1, R33.8    CAD (coronary artery disease) I25.10    Colon cancer ([facility]) C18.9    Diabetes ([facility]) E11.9    High cholesterol E78.00    History of colonoscopy Z98.890    HLD (hyperlipidemia) E78.5    Osteoarthritis M19.90    Parkinson's disease ([facility] G20    Shortness of breath R06.02    Stroke ([facility]) I63.9    Hypertension I10    Type 2 diabetes mellitus with diabetic neuropathy ([facility]) E11.40    UTI (urinary tract infection) N39.0    Hypokalemia E87.6     Current Outpatient Medications   Medication Sig Dispense Refill    potassium chloride (K-DUR, KLOR-CON) 20 mEq tablet Take 1 Tab by mouth two (2) times a day. 60 Tab 0    psyllium husk-aspartame (METAMUCIL FIBER) 3.4 gram pwpk packet Take 1 Packet by mouth two (2) times a day. 30 Packet 0    megestrol (MEGACE) 20 mg tablet Take 1 Tab by mouth daily. 30 Tab 0    carbidopa-levodopa ER (SINEMET CR)  mg per tablet Take 1 Tab by mouth three (3) times daily. 1 Tab 0    pentoxifylline CR (TRENTAL) 400 mg CR tablet Take 400 mg by mouth daily. 1 tab daily      loperamide (IMODIUM A-D) 2 mg tablet Take 2 mg by mouth daily. Indications: diarrhea      nitroglycerin (NITROSTAT) 0.4 mg SL tablet 1 Tab by SubLINGual route as needed for Chest Pain.  1 Bottle 3    finasteride (PROSCAR) 5 mg tablet Take 5 mg by mouth daily.  isosorbide mononitrate ER (IMDUR) 60 mg CR tablet Take 1 Tab by mouth daily. (Patient taking differently: Take 60 mg by mouth nightly.) 30 Tab 6    ramipril (ALTACE) 10 mg capsule Take 1 Cap by mouth daily. (Patient taking differently: Take 5 mg by mouth daily. Indications: high blood pressure) 30 Cap 0    sitaGLIPtin (JANUVIA) 50 mg tablet Take 1 Tab by mouth daily. 30 Tab 0    docusate sodium (COLACE) 100 mg capsule Take 100 mg by mouth daily.  gabapentin (NEURONTIN) 600 mg tablet Take 800 mg by mouth two (2) times a day.  simvastatin (ZOCOR) 20 mg tablet Take 20 mg by mouth nightly.  tamsulosin (FLOMAX) 0.4 mg capsule Take 1 capsule by mouth daily (after dinner). 90 capsule 3    metoprolol (LOPRESSOR) 25 mg tablet Take 1 Tab by mouth two (2) times a day. 60 Tab 11    aspirin 81 mg chewable tablet Take 1 Tab by mouth daily. (Patient taking differently: Take 81 mg by mouth daily. havent taken ASA in 6 months) 90 Tab 3     No Known Allergies    Physical Exam:    Visit Vitals  /80 (BP 1 Location: Left arm, BP Patient Position: Sitting)   Pulse 67   Resp 16   Ht 6' 1\" (1.854 m)   Wt 200 lb (90.7 kg)   SpO2 98%   BMI 26.39 kg/m²      General: elderly male in no acute distress. Patient is in a motorized wheelchair  HEENT: EOMI, no scleral icterus is noted. No carotid bruit appreciated bilaterally  CV: RRR  Pulmonary: No increased work or breathing is noted. CTA bilaterally  Abdomen: nondistended, soft. Extremities: Warm and well perfused bilaterally. No significant bilateral lower extremity edema. He has no skin changes or ulcers bilaterally. Neuro: Cranial nerves II through XII are grossly intact   Integument: No ulcerations are identified visibly      Impression and Plan:  Divina Avila is a 80 y.o. male with moderate-severe peripheral arterial disease of the bilateral lower extremities. He currently has no skin changes or open ulcers.  He does seem to be doing well overall. He does not ambulate and therefore does not complain of claudication. He does not have any rest pain currently. I discussed with patient and his daughter that we will continue to monitor him closely with routine surveillance and we will obtain follow-up arterial studies in 6 months. He will f/u afterwards. They were educated on the signs and symptoms of worsening arterial insufficiency and will call the office sooner as needed. Plan was discussed. Patient expresses understanding and agrees. [address] [place] [address] N PA-C        PLEASE NOTE:  This document has been produced using voice recognition software. Unrecognized errors in transcription may be present.

## 2020-05-26 ENCOUNTER — TELEPHONE (OUTPATIENT)
Dept: VASCULAR SURGERY | Age: 85
End: 2020-05-26

## 2020-05-26 NOTE — TELEPHONE ENCOUNTER
Daughter, Radha Agustin called stating that despite his father wearing the compression stockings, both his legs are swelling and now showing a change in color. Requesting for a return call.

## 2020-06-03 ENCOUNTER — TELEPHONE (OUTPATIENT)
Dept: VASCULAR SURGERY | Age: 85
End: 2020-06-03

## 2020-06-03 NOTE — TELEPHONE ENCOUNTER
Patient home health nurse is calling [facility]- [phone] States that his heel has a pressure ulcer and his r great toenail is black and his left heel is a pressure ulcer now opened and it measures 0.5x 0.5x0.1.   He does have appts on June 11th here for test and follow up I believe with [facility]

## 2020-06-04 NOTE — TELEPHONE ENCOUNTER
I dont have any appts so he could be same day with stephanie? The closest one will be after the appt he is already scheduled for.

## 2020-06-11 ENCOUNTER — OFFICE VISIT (OUTPATIENT)
Dept: VASCULAR SURGERY | Age: 85
End: 2020-06-11

## 2020-06-11 VITALS
BODY MASS INDEX: 26.51 KG/M2 | DIASTOLIC BLOOD PRESSURE: 62 MMHG | HEART RATE: 76 BPM | OXYGEN SATURATION: 100 % | RESPIRATION RATE: 14 BRPM | WEIGHT: 200 LBS | SYSTOLIC BLOOD PRESSURE: 120 MMHG | HEIGHT: 73 IN

## 2020-06-11 DIAGNOSIS — I70.229 CRITICAL LOWER LIMB ISCHEMIA (HCC): ICD-10-CM

## 2020-06-11 DIAGNOSIS — I73.9 PAD (PERIPHERAL ARTERY DISEASE) (HCC): Primary | ICD-10-CM

## 2020-06-11 NOTE — PROGRESS NOTES
Wayland Nissen    Chief Complaint   Patient presents with    Leg Pain       History and Physical    Wayland Nissen is a 80 y.o. gentleman with history of critical limb ischemia of the left lower extremity with history of ulceration of the left lower extremity. Patient does also have Parkinson's disease and presents to the office today with his daughter in follow-up - sooner than scheduled due to concerns of wounds to his left heel and black discoloration of nailbed of right great toe. He is s/p left leg angiogram with atherectomy and balloon angioplasty of the superficial femoral artery and popliteal artery done November 2018. He does not ambulate much and is mostly wheelchair-bound so claudication is difficult to assess. He is not complaining of any rest pain at this time. the left heel area can be sensitive when lying in bed at night but he does have what sounds like some type of podiatry boot that alleviates pressure on the heel    He did see podiatry about a month ago and is due to follow-up again June 24.   Home health was what was really concerned about the appearance of these areas    Past Medical History:   Diagnosis Date    Benign prostatic hyperplasia with urinary retention     BPH (benign prostatic hyperplasia)     CAD (coronary artery disease)     Severe 3 vessel ( Cath 05/2014) Medical management    Colon cancer [facility])     CVA (cerebral infarction)     x2    Diabetes ([address]     High cholesterol     History of colonoscopy     HLD (hyperlipidemia)     Hypertension     Osteoarthritis     Parkinson's disease ([address]     Shortness of breath     Stroke ([address]     Tuberculosis of lung     Urinary retention      Patient Active Problem List   Diagnosis Code    Acute bronchitis J20.9    Chest pain, unspecified R07.9    HTN (hypertension) I10    Parkinson disease ([address] G20    Pure hypercholesterolemia E78.00    DM (diabetes mellitus) ([address] E11.9    Acute coronary syndrome ([address] I24.9    Acute renal insufficiency N28.9    Coronary atherosclerosis of native coronary artery I25.10    BPH (benign prostatic hyperplasia) N40.0    Urinary retention R33.9    Partial small bowel obstruction ([facility]) K56.600    Benign prostate hyperplasia N40.0    NSTEMI (non-ST elevated myocardial infarction) ([facility]) I21.4    Benign prostatic hyperplasia with urinary retention N40.1, R33.8    CAD (coronary artery disease) I25.10    Colon cancer ([facility]) C18.9    Diabetes ([facility]) E11.9    High cholesterol E78.00    History of colonoscopy Z98.890    HLD (hyperlipidemia) E78.5    Osteoarthritis M19.90    Parkinson's disease ([facility] G20    Shortness of breath R06.02    Stroke ([facility] I63.9    Hypertension I10    Type 2 diabetes mellitus with diabetic neuropathy ([facility]) E11.40    UTI (urinary tract infection) N39.0    Hypokalemia E87.6     Past Surgical History:   Procedure Laterality Date    HX HEART CATHETERIZATION      severe 3 vessel- medical management 2014    HX ORTHOPAEDIC      R leg surgery    [facility]OLOGICAL  10/25/2017    [facility]      Current Outpatient Medications   Medication Sig Dispense Refill    potassium chloride (K-DUR, KLOR-CON) 20 mEq tablet Take 1 Tab by mouth two (2) times a day. 60 Tab 0    psyllium husk-aspartame (METAMUCIL FIBER) 3.4 gram pwpk packet Take 1 Packet by mouth two (2) times a day. 30 Packet 0    megestrol (MEGACE) 20 mg tablet Take 1 Tab by mouth daily. 30 Tab 0    carbidopa-levodopa ER (SINEMET CR)  mg per tablet Take 1 Tab by mouth three (3) times daily. 1 Tab 0    pentoxifylline CR (TRENTAL) 400 mg CR tablet Take 400 mg by mouth daily. 1 tab daily      loperamide (IMODIUM A-D) 2 mg tablet Take 2 mg by mouth daily. Indications: diarrhea      nitroglycerin (NITROSTAT) 0.4 mg SL tablet 1 Tab by SubLINGual route as needed for Chest Pain. 1 Bottle 3    finasteride (PROSCAR) 5 mg tablet Take 5 mg by mouth daily.       isosorbide mononitrate ER (IMDUR) 60 mg CR tablet Take 1 Tab by mouth daily. (Patient taking differently: Take 60 mg by mouth nightly.) 30 Tab 6    ramipril (ALTACE) 10 mg capsule Take 1 Cap by mouth daily. (Patient taking differently: Take 5 mg by mouth daily. Indications: high blood pressure) 30 Cap 0    sitaGLIPtin (JANUVIA) 50 mg tablet Take 1 Tab by mouth daily. 30 Tab 0    docusate sodium (COLACE) 100 mg capsule Take 100 mg by mouth daily.  gabapentin (NEURONTIN) 600 mg tablet Take 800 mg by mouth two (2) times a day.  simvastatin (ZOCOR) 20 mg tablet Take 20 mg by mouth nightly.  tamsulosin (FLOMAX) 0.4 mg capsule Take 1 capsule by mouth daily (after dinner). 90 capsule 3    metoprolol (LOPRESSOR) 25 mg tablet Take 1 Tab by mouth two (2) times a day. 60 Tab 11    aspirin 81 mg chewable tablet Take 1 Tab by mouth daily. (Patient taking differently: Take 81 mg by mouth daily. havent taken ASA in 6 months) 90 Tab 3     No Known Allergies      Physical   Visit Vitals  /62 (BP 1 Location: Left arm, BP Patient Position: Sitting)   Pulse 76   Resp 14   Ht 6' 1\" (1.854 m)   Wt 200 lb (90.7 kg)   SpO2 100%   BMI 26.39 kg/m²     General:  Alert, cooperative, no distress. Head:  Normocephalic, without obvious abnormality, atraumatic. Eyes:    Conjunctivae/corneas clear. Pupils equal, round, reactive to light. Extraocular movements intact. Extremities:  He has some mild ankle and pedal edema bilaterally but was wearing Tubigrip for compression and this was reapplied for him today   Pulses:  Distal pulses are nonpalpable but there is no rubor   Skin:  He has a mostly avulsed right great toenail with may be some minimal regrowth since the timing of what was described as the nail removal.  But the whole area is black. But there is no surrounding erythema warmth or tenderness, no drainage no odor. It really is just isolated to this nail bed region.   The left lateral heel area has a small dry eschar higher and then lower is a superficial ulceration with a pale wound bed but no significant drainage and also no significant surrounding erythema warmth or tenderness        Vascular studies:  JOSHUA     The right resting JOSHUA is moderately decreased. The left resting JOSHUA is severely decreased. Arterial disease noted at the level of the tibial artery on the right side. Arterial disease noted at the level of the femoral artery, popliteal and tibial artery on the left side. The right anterior tibial artery and posterior tibial artery has monophasic waveforms. The right common femoral artery and popliteal artery has triphasic waveforms. Right toe PPG is dampened. The left anterior tibial artery and posterior tibial artery has monophasic waveforms. The left popliteal artery has biphasic waveforms. The left common femoral artery has triphasic waveforms. Left toe PPG is dampened. Moderate arterial disease at rest in the right leg with infrapopliteal disease. Moderate to severe arterial disease at rest in the left leg with popliteal, tibial disease with superficial femoral artery involvement. JOSHUA's not reliable     The exam was compared to the study performed on 2/19/2020. No significant change. Impression/Plan:     ICD-10-CM ICD-9-CM    1. PAD (peripheral artery disease) ([facility]) I73.9 443.9    2. Critical lower limb ischemia I99.8 459.9      No orders of the defined types were placed in this encounter. We redressed the ulcer today with a small Mepilex dressing and Aquacel and reapplied his Tubigrip  I did review that his studies are relatively unchanged but that if we are experiencing slow healing or worsening of these ulcerations it may be worth have been to entertain repeat intervention for the left lower extremity.   I really like to get a better consensus of the status of these wounds as podiatry has been following him and I will reach out to them about their overall impression so that we can make a more informative decision about a procedure. I appreciate their input because of the fact he does not have ischemic pain at this time and his studies are unchanged from previous when we had not considered need for any intervention, which that was earlier this year    We will call them back with a follow-up plan after we discussed with podiatry      EDITA Lam    Portions of this note have been entered using voice recognition software.

## 2020-09-03 ENCOUNTER — TELEPHONE (OUTPATIENT)
Dept: VASCULAR SURGERY | Age: 85
End: 2020-09-03

## 2025-07-16 NOTE — TELEPHONE ENCOUNTER
Spoke to Curry Sanon, daughter to follow up with patient to schedule an appointment. Daughter informed our office that patient is in hospice care as of August 29 and [facility] is the company that is caring for the patient. No follow up necessary unless they call our office. Contact number [facility] is [phone].
Detail Level: Detailed

## (undated) DEVICE — ANGIO-SEAL VIP VASCULAR CLOSURE DEVICE: Brand: ANGIO-SEAL

## (undated) DEVICE — RADIFOCUS GLIDEWIRE: Brand: GLIDEWIRE

## (undated) DEVICE — PACK PROCEDURE SURG VASC CATH 161 MMC LF

## (undated) DEVICE — ARMADA 18 PTA CATHETER 6.0 MM X 150 MM X 150 CM / OVER-THE-WIRE: Brand: ARMADA

## (undated) DEVICE — COVER US PRB W15XL120CM W/ GEL RUBBERBAND TAPE STRP FLD GEN

## (undated) DEVICE — Device: Brand: QUICK-CROSS SUPPORT CATHETER

## (undated) DEVICE — LUB GUID WR CARDIC CATH 100ML -- VIPERSLIDE

## (undated) DEVICE — INTRODUCER SHTH 4FR CANN L11CM DIL TIP 25MM RED TUNGSTEN

## (undated) DEVICE — VIPERWIRE, ADVANCE PERIPHERAL, .017" DIA SPRING TIP, 335CM, 5 PACK: Brand: VIPERWIRE, ADVANCE

## (undated) DEVICE — DIAMONDBACK PERIPHERAL, SOLID CROWN, 1.50MM, 145CM SHAFT: Brand: DIAMONDBACK PERIPHERAL

## (undated) DEVICE — CATHETER ANGIO 4FR L65CM 0.035IN VIS OMNI FLUSH-0 SFT TIP

## (undated) DEVICE — SET FLD ADMIN 3 W STPCOCK FIX FEM L BOR 1IN

## (undated) DEVICE — SYR ART 700 CLEAR MARK 7 -- ARTERION

## (undated) DEVICE — FLEXOR, CHECK-FLO, INTRODUCER ANSEL MODIFICATION - WITH HIGH-FLEX DILATOR AND HYDROPHILIC COATING: Brand: FLEXOR

## (undated) DEVICE — KIT MIC INTR PERC 4F 60CM SMTH -- VSI

## (undated) DEVICE — ANGIOGRAPHY KIT CUST VASC

## (undated) DEVICE — Device: Brand: ASTATO 30